# Patient Record
Sex: MALE | Race: WHITE | Employment: OTHER | RURAL
[De-identification: names, ages, dates, MRNs, and addresses within clinical notes are randomized per-mention and may not be internally consistent; named-entity substitution may affect disease eponyms.]

---

## 2017-01-09 DIAGNOSIS — G89.29 CHRONIC MIDLINE LOW BACK PAIN, WITH SCIATICA PRESENCE UNSPECIFIED: ICD-10-CM

## 2017-01-09 DIAGNOSIS — M54.5 CHRONIC MIDLINE LOW BACK PAIN, WITH SCIATICA PRESENCE UNSPECIFIED: ICD-10-CM

## 2017-01-09 DIAGNOSIS — M19.90 ARTHRITIS: ICD-10-CM

## 2017-01-09 RX ORDER — HYDROCODONE BITARTRATE AND ACETAMINOPHEN 10; 325 MG/1; MG/1
1 TABLET ORAL EVERY 8 HOURS PRN
Qty: 90 TABLET | Refills: 0 | Status: SHIPPED | OUTPATIENT
Start: 2017-01-09 | End: 2017-02-06 | Stop reason: SDUPTHER

## 2017-01-10 DIAGNOSIS — M19.90 ARTHRITIS: ICD-10-CM

## 2017-01-10 RX ORDER — ALPRAZOLAM 1 MG/1
1 TABLET ORAL 3 TIMES DAILY PRN
Qty: 60 TABLET | Refills: 2 | OUTPATIENT
Start: 2017-01-10

## 2017-01-31 RX ORDER — POTASSIUM CHLORIDE 1500 MG/1
TABLET, EXTENDED RELEASE ORAL
Qty: 60 TABLET | Refills: 5 | Status: SHIPPED | OUTPATIENT
Start: 2017-01-31 | End: 2017-02-20 | Stop reason: SDUPTHER

## 2017-02-02 RX ORDER — AMLODIPINE BESYLATE 10 MG/1
TABLET ORAL
Qty: 30 TABLET | Refills: 5 | Status: SHIPPED | OUTPATIENT
Start: 2017-02-02 | End: 2017-05-04 | Stop reason: SDUPTHER

## 2017-02-06 DIAGNOSIS — M19.90 ARTHRITIS: ICD-10-CM

## 2017-02-06 DIAGNOSIS — G89.29 CHRONIC MIDLINE LOW BACK PAIN, WITH SCIATICA PRESENCE UNSPECIFIED: ICD-10-CM

## 2017-02-06 DIAGNOSIS — M54.5 CHRONIC MIDLINE LOW BACK PAIN, WITH SCIATICA PRESENCE UNSPECIFIED: ICD-10-CM

## 2017-02-06 RX ORDER — HYDROCODONE BITARTRATE AND ACETAMINOPHEN 10; 325 MG/1; MG/1
1 TABLET ORAL EVERY 8 HOURS PRN
Qty: 90 TABLET | Refills: 0 | Status: SHIPPED | OUTPATIENT
Start: 2017-02-06 | End: 2017-03-08 | Stop reason: SDUPTHER

## 2017-02-10 ENCOUNTER — OFFICE VISIT (OUTPATIENT)
Dept: FAMILY MEDICINE CLINIC | Age: 77
End: 2017-02-10
Payer: MEDICARE

## 2017-02-10 VITALS
WEIGHT: 162 LBS | SYSTOLIC BLOOD PRESSURE: 140 MMHG | DIASTOLIC BLOOD PRESSURE: 80 MMHG | HEIGHT: 70 IN | BODY MASS INDEX: 23.19 KG/M2

## 2017-02-10 DIAGNOSIS — M54.5 CHRONIC MIDLINE LOW BACK PAIN, WITH SCIATICA PRESENCE UNSPECIFIED: ICD-10-CM

## 2017-02-10 DIAGNOSIS — M1A.10X0 LEAD-INDUCED CHRONIC GOUT WITHOUT TOPHUS, UNSPECIFIED SITE, SEQUELA: ICD-10-CM

## 2017-02-10 DIAGNOSIS — T56.0X1S LEAD-INDUCED CHRONIC GOUT WITHOUT TOPHUS, UNSPECIFIED SITE, SEQUELA: ICD-10-CM

## 2017-02-10 DIAGNOSIS — I10 ESSENTIAL HYPERTENSION: Primary | ICD-10-CM

## 2017-02-10 DIAGNOSIS — F41.9 ANXIETY: ICD-10-CM

## 2017-02-10 DIAGNOSIS — G62.9 PERIPHERAL POLYNEUROPATHY: ICD-10-CM

## 2017-02-10 DIAGNOSIS — G89.29 CHRONIC MIDLINE LOW BACK PAIN, WITH SCIATICA PRESENCE UNSPECIFIED: ICD-10-CM

## 2017-02-10 PROCEDURE — 1123F ACP DISCUSS/DSCN MKR DOCD: CPT | Performed by: NURSE PRACTITIONER

## 2017-02-10 PROCEDURE — 4040F PNEUMOC VAC/ADMIN/RCVD: CPT | Performed by: NURSE PRACTITIONER

## 2017-02-10 PROCEDURE — G8420 CALC BMI NORM PARAMETERS: HCPCS | Performed by: NURSE PRACTITIONER

## 2017-02-10 PROCEDURE — G8427 DOCREV CUR MEDS BY ELIG CLIN: HCPCS | Performed by: NURSE PRACTITIONER

## 2017-02-10 PROCEDURE — 99214 OFFICE O/P EST MOD 30 MIN: CPT | Performed by: NURSE PRACTITIONER

## 2017-02-10 PROCEDURE — G8484 FLU IMMUNIZE NO ADMIN: HCPCS | Performed by: NURSE PRACTITIONER

## 2017-02-10 PROCEDURE — 4004F PT TOBACCO SCREEN RCVD TLK: CPT | Performed by: NURSE PRACTITIONER

## 2017-02-10 RX ORDER — GABAPENTIN 300 MG/1
300 CAPSULE ORAL NIGHTLY
Qty: 30 CAPSULE | Refills: 5 | Status: SHIPPED | OUTPATIENT
Start: 2017-02-10 | End: 2017-05-04 | Stop reason: SDUPTHER

## 2017-02-10 RX ORDER — PROBENECID AND COLCHICINE 500; .5 MG/1; MG/1
1 TABLET ORAL 2 TIMES DAILY
Qty: 60 TABLET | Refills: 5 | Status: SHIPPED | OUTPATIENT
Start: 2017-02-10 | End: 2017-05-04 | Stop reason: SDUPTHER

## 2017-02-10 ASSESSMENT — ENCOUNTER SYMPTOMS
DOUBLE VISION: 0
COUGH: 0
EYES NEGATIVE: 1
SHORTNESS OF BREATH: 0
WHEEZING: 0
ORTHOPNEA: 0
HEARTBURN: 0
NAUSEA: 0
ABDOMINAL PAIN: 0
GASTROINTESTINAL NEGATIVE: 1
EYE PAIN: 0
PHOTOPHOBIA: 0
BACK PAIN: 1
RESPIRATORY NEGATIVE: 1
DIARRHEA: 0

## 2017-02-14 RX ORDER — ALLOPURINOL 300 MG/1
TABLET ORAL
Qty: 30 TABLET | Refills: 5 | Status: SHIPPED | OUTPATIENT
Start: 2017-02-14 | End: 2017-05-04 | Stop reason: SDUPTHER

## 2017-02-20 RX ORDER — POTASSIUM CHLORIDE 20 MEQ/1
TABLET, EXTENDED RELEASE ORAL
Qty: 60 TABLET | Refills: 5 | Status: SHIPPED | OUTPATIENT
Start: 2017-02-20 | End: 2017-02-21 | Stop reason: SDUPTHER

## 2017-02-21 RX ORDER — POTASSIUM CHLORIDE 20 MEQ/1
TABLET, EXTENDED RELEASE ORAL
Qty: 60 TABLET | Refills: 5 | Status: SHIPPED | OUTPATIENT
Start: 2017-02-21 | End: 2017-02-22 | Stop reason: SDUPTHER

## 2017-02-22 RX ORDER — POTASSIUM CHLORIDE 20 MEQ/1
TABLET, EXTENDED RELEASE ORAL
Qty: 60 TABLET | Refills: 5 | Status: SHIPPED | OUTPATIENT
Start: 2017-02-22 | End: 2017-05-04 | Stop reason: SDUPTHER

## 2017-03-08 DIAGNOSIS — G89.29 CHRONIC MIDLINE LOW BACK PAIN, WITH SCIATICA PRESENCE UNSPECIFIED: ICD-10-CM

## 2017-03-08 DIAGNOSIS — M54.5 CHRONIC MIDLINE LOW BACK PAIN, WITH SCIATICA PRESENCE UNSPECIFIED: ICD-10-CM

## 2017-03-08 DIAGNOSIS — M19.90 ARTHRITIS: ICD-10-CM

## 2017-03-08 RX ORDER — HYDROCODONE BITARTRATE AND ACETAMINOPHEN 10; 325 MG/1; MG/1
1 TABLET ORAL EVERY 8 HOURS PRN
Qty: 90 TABLET | Refills: 0 | Status: SHIPPED | OUTPATIENT
Start: 2017-03-08 | End: 2017-04-05 | Stop reason: SDUPTHER

## 2017-03-08 RX ORDER — ALPRAZOLAM 1 MG/1
1 TABLET ORAL 3 TIMES DAILY PRN
Qty: 60 TABLET | Refills: 2 | Status: SHIPPED | OUTPATIENT
Start: 2017-03-08 | End: 2017-05-04 | Stop reason: SDUPTHER

## 2017-04-05 DIAGNOSIS — G89.29 CHRONIC MIDLINE LOW BACK PAIN, WITH SCIATICA PRESENCE UNSPECIFIED: ICD-10-CM

## 2017-04-05 DIAGNOSIS — M19.90 ARTHRITIS: ICD-10-CM

## 2017-04-05 DIAGNOSIS — M54.5 CHRONIC MIDLINE LOW BACK PAIN, WITH SCIATICA PRESENCE UNSPECIFIED: ICD-10-CM

## 2017-04-05 RX ORDER — HYDROCODONE BITARTRATE AND ACETAMINOPHEN 10; 325 MG/1; MG/1
1 TABLET ORAL EVERY 8 HOURS PRN
Qty: 90 TABLET | Refills: 0 | Status: SHIPPED | OUTPATIENT
Start: 2017-04-05 | End: 2017-05-04 | Stop reason: SDUPTHER

## 2017-05-01 RX ORDER — HYDROCHLOROTHIAZIDE 25 MG/1
TABLET ORAL
Qty: 30 TABLET | Refills: 5 | Status: SHIPPED | OUTPATIENT
Start: 2017-05-01 | End: 2017-05-04 | Stop reason: SDUPTHER

## 2017-05-04 ENCOUNTER — OFFICE VISIT (OUTPATIENT)
Dept: FAMILY MEDICINE CLINIC | Age: 77
End: 2017-05-04
Payer: MEDICARE

## 2017-05-04 VITALS
WEIGHT: 161 LBS | SYSTOLIC BLOOD PRESSURE: 120 MMHG | DIASTOLIC BLOOD PRESSURE: 88 MMHG | HEIGHT: 70 IN | BODY MASS INDEX: 23.05 KG/M2

## 2017-05-04 DIAGNOSIS — F41.9 ANXIETY: ICD-10-CM

## 2017-05-04 DIAGNOSIS — G89.29 CHRONIC MIDLINE LOW BACK PAIN, WITH SCIATICA PRESENCE UNSPECIFIED: ICD-10-CM

## 2017-05-04 DIAGNOSIS — M1A.10X0 LEAD-INDUCED CHRONIC GOUT WITHOUT TOPHUS, UNSPECIFIED SITE, SEQUELA: ICD-10-CM

## 2017-05-04 DIAGNOSIS — I10 ESSENTIAL HYPERTENSION: Primary | ICD-10-CM

## 2017-05-04 DIAGNOSIS — T56.0X1S LEAD-INDUCED CHRONIC GOUT WITHOUT TOPHUS, UNSPECIFIED SITE, SEQUELA: ICD-10-CM

## 2017-05-04 DIAGNOSIS — R53.83 MALAISE AND FATIGUE: ICD-10-CM

## 2017-05-04 DIAGNOSIS — E78.00 ELEVATED CHOLESTEROL: ICD-10-CM

## 2017-05-04 DIAGNOSIS — M54.5 CHRONIC MIDLINE LOW BACK PAIN, WITH SCIATICA PRESENCE UNSPECIFIED: ICD-10-CM

## 2017-05-04 DIAGNOSIS — M19.90 ARTHRITIS: ICD-10-CM

## 2017-05-04 DIAGNOSIS — R53.81 MALAISE AND FATIGUE: ICD-10-CM

## 2017-05-04 LAB
ALBUMIN SERPL-MCNC: 4.5 G/DL (ref 3.5–5.2)
ALP BLD-CCNC: 99 U/L (ref 40–130)
ALT SERPL-CCNC: 22 U/L (ref 5–41)
ANION GAP SERPL CALCULATED.3IONS-SCNC: 16 MMOL/L (ref 7–19)
AST SERPL-CCNC: 23 U/L (ref 5–40)
BILIRUB SERPL-MCNC: 0.8 MG/DL (ref 0.2–1.2)
BUN BLDV-MCNC: 13 MG/DL (ref 8–23)
CALCIUM SERPL-MCNC: 10.2 MG/DL (ref 8.8–10.2)
CHLORIDE BLD-SCNC: 87 MMOL/L (ref 98–111)
CHOLESTEROL, TOTAL: 195 MG/DL (ref 160–199)
CO2: 30 MMOL/L (ref 22–29)
CREAT SERPL-MCNC: 1 MG/DL (ref 0.5–1.2)
GFR NON-AFRICAN AMERICAN: >60
GLOBULIN: 3.1 G/DL
GLUCOSE BLD-MCNC: 88 MG/DL (ref 74–109)
HCT VFR BLD CALC: 47.4 % (ref 42–52)
HDLC SERPL-MCNC: 86 MG/DL (ref 55–121)
HEMOGLOBIN: 16.5 G/DL (ref 14–18)
LDL CHOLESTEROL CALCULATED: 95 MG/DL
MCH RBC QN AUTO: 34.9 PG (ref 27–31)
MCHC RBC AUTO-ENTMCNC: 34.8 G/DL (ref 33–37)
MCV RBC AUTO: 100.2 FL (ref 80–94)
PDW BLD-RTO: 12.9 % (ref 11.5–14.5)
PLATELET # BLD: 243 K/UL (ref 130–400)
PMV BLD AUTO: 10.7 FL (ref 7.4–10.4)
POTASSIUM SERPL-SCNC: 4.1 MMOL/L (ref 3.5–5)
RBC # BLD: 4.73 M/UL (ref 4.7–6.1)
SODIUM BLD-SCNC: 133 MMOL/L (ref 136–145)
TOTAL PROTEIN: 7.6 G/DL (ref 6.6–8.7)
TRIGL SERPL-MCNC: 68 MG/DL (ref 150–199)
WBC # BLD: 5.7 K/UL (ref 4.8–10.8)

## 2017-05-04 PROCEDURE — G8427 DOCREV CUR MEDS BY ELIG CLIN: HCPCS | Performed by: NURSE PRACTITIONER

## 2017-05-04 PROCEDURE — 1123F ACP DISCUSS/DSCN MKR DOCD: CPT | Performed by: NURSE PRACTITIONER

## 2017-05-04 PROCEDURE — 36415 COLL VENOUS BLD VENIPUNCTURE: CPT | Performed by: NURSE PRACTITIONER

## 2017-05-04 PROCEDURE — 96372 THER/PROPH/DIAG INJ SC/IM: CPT | Performed by: NURSE PRACTITIONER

## 2017-05-04 PROCEDURE — 99214 OFFICE O/P EST MOD 30 MIN: CPT | Performed by: NURSE PRACTITIONER

## 2017-05-04 PROCEDURE — 4004F PT TOBACCO SCREEN RCVD TLK: CPT | Performed by: NURSE PRACTITIONER

## 2017-05-04 PROCEDURE — G8420 CALC BMI NORM PARAMETERS: HCPCS | Performed by: NURSE PRACTITIONER

## 2017-05-04 PROCEDURE — 4040F PNEUMOC VAC/ADMIN/RCVD: CPT | Performed by: NURSE PRACTITIONER

## 2017-05-04 RX ORDER — DICLOFENAC SODIUM 75 MG/1
75 TABLET, DELAYED RELEASE ORAL 2 TIMES DAILY PRN
Qty: 60 TABLET | Refills: 5 | Status: SHIPPED | OUTPATIENT
Start: 2017-05-04

## 2017-05-04 RX ORDER — HYDROCODONE BITARTRATE AND ACETAMINOPHEN 10; 325 MG/1; MG/1
1 TABLET ORAL EVERY 8 HOURS PRN
Qty: 90 TABLET | Refills: 0 | Status: SHIPPED | OUTPATIENT
Start: 2017-05-04 | End: 2017-06-05 | Stop reason: SDUPTHER

## 2017-05-04 RX ORDER — CYANOCOBALAMIN 1000 UG/ML
1000 INJECTION INTRAMUSCULAR; SUBCUTANEOUS ONCE
Status: COMPLETED | OUTPATIENT
Start: 2017-05-04 | End: 2017-05-04

## 2017-05-04 RX ORDER — ALPRAZOLAM 1 MG/1
1 TABLET ORAL 3 TIMES DAILY PRN
Qty: 60 TABLET | Refills: 2 | Status: CANCELLED | OUTPATIENT
Start: 2017-05-04

## 2017-05-04 RX ORDER — GABAPENTIN 300 MG/1
300 CAPSULE ORAL NIGHTLY
Qty: 30 CAPSULE | Refills: 5 | Status: SHIPPED | OUTPATIENT
Start: 2017-05-04

## 2017-05-04 RX ORDER — POTASSIUM CHLORIDE 20 MEQ/1
TABLET, EXTENDED RELEASE ORAL
Qty: 60 TABLET | Refills: 5 | Status: SHIPPED | OUTPATIENT
Start: 2017-05-04

## 2017-05-04 RX ORDER — ALLOPURINOL 300 MG/1
TABLET ORAL
Qty: 30 TABLET | Refills: 5 | Status: SHIPPED | OUTPATIENT
Start: 2017-05-04 | End: 2017-10-31 | Stop reason: SDUPTHER

## 2017-05-04 RX ORDER — HYDROCHLOROTHIAZIDE 25 MG/1
TABLET ORAL
Qty: 30 TABLET | Refills: 5 | Status: SHIPPED | OUTPATIENT
Start: 2017-05-04

## 2017-05-04 RX ORDER — AMLODIPINE BESYLATE 10 MG/1
TABLET ORAL
Qty: 30 TABLET | Refills: 5 | Status: SHIPPED | OUTPATIENT
Start: 2017-05-04

## 2017-05-04 RX ORDER — ALPRAZOLAM 1 MG/1
1 TABLET ORAL 3 TIMES DAILY PRN
Qty: 60 TABLET | Refills: 2 | Status: SHIPPED | OUTPATIENT
Start: 2017-05-04 | End: 2017-06-29 | Stop reason: SDUPTHER

## 2017-05-04 RX ORDER — PROBENECID AND COLCHICINE 500; .5 MG/1; MG/1
1 TABLET ORAL 2 TIMES DAILY
Qty: 60 TABLET | Refills: 5 | Status: SHIPPED | OUTPATIENT
Start: 2017-05-04 | End: 2017-11-28 | Stop reason: SDUPTHER

## 2017-05-04 RX ORDER — DIPHENHYDRAMINE HCL 25 MG
CAPSULE ORAL
Qty: 90 CAPSULE | Refills: 2 | Status: SHIPPED | OUTPATIENT
Start: 2017-05-04 | End: 2017-11-08 | Stop reason: SDUPTHER

## 2017-05-04 RX ADMIN — CYANOCOBALAMIN 1000 MCG: 1000 INJECTION INTRAMUSCULAR; SUBCUTANEOUS at 13:04

## 2017-05-04 ASSESSMENT — ENCOUNTER SYMPTOMS
EYE PAIN: 0
PHOTOPHOBIA: 0
WHEEZING: 0
NAUSEA: 0
DIARRHEA: 0
HEARTBURN: 0
COUGH: 0
DOUBLE VISION: 0
GASTROINTESTINAL NEGATIVE: 1
ORTHOPNEA: 0
BACK PAIN: 1
EYES NEGATIVE: 1
SHORTNESS OF BREATH: 0
ABDOMINAL PAIN: 0
RESPIRATORY NEGATIVE: 1

## 2017-06-05 DIAGNOSIS — G89.29 CHRONIC MIDLINE LOW BACK PAIN, WITH SCIATICA PRESENCE UNSPECIFIED: ICD-10-CM

## 2017-06-05 DIAGNOSIS — M54.5 CHRONIC MIDLINE LOW BACK PAIN, WITH SCIATICA PRESENCE UNSPECIFIED: ICD-10-CM

## 2017-06-05 DIAGNOSIS — M19.90 ARTHRITIS: ICD-10-CM

## 2017-06-05 RX ORDER — HYDROCODONE BITARTRATE AND ACETAMINOPHEN 10; 325 MG/1; MG/1
1 TABLET ORAL EVERY 8 HOURS PRN
Qty: 90 TABLET | Refills: 0 | Status: SHIPPED | OUTPATIENT
Start: 2017-06-05 | End: 2017-06-29 | Stop reason: SDUPTHER

## 2017-06-29 DIAGNOSIS — M19.90 ARTHRITIS: ICD-10-CM

## 2017-06-29 DIAGNOSIS — M54.5 CHRONIC MIDLINE LOW BACK PAIN, WITH SCIATICA PRESENCE UNSPECIFIED: ICD-10-CM

## 2017-06-29 DIAGNOSIS — G89.29 CHRONIC MIDLINE LOW BACK PAIN, WITH SCIATICA PRESENCE UNSPECIFIED: ICD-10-CM

## 2017-06-29 RX ORDER — ALPRAZOLAM 1 MG/1
1 TABLET ORAL 3 TIMES DAILY PRN
Qty: 60 TABLET | Refills: 0 | Status: SHIPPED | OUTPATIENT
Start: 2017-06-29 | End: 2017-08-04 | Stop reason: SDUPTHER

## 2017-06-29 RX ORDER — HYDROCODONE BITARTRATE AND ACETAMINOPHEN 10; 325 MG/1; MG/1
1 TABLET ORAL EVERY 8 HOURS PRN
Qty: 90 TABLET | Refills: 0 | Status: SHIPPED | OUTPATIENT
Start: 2017-06-29 | End: 2017-08-04 | Stop reason: SDUPTHER

## 2017-08-04 ENCOUNTER — OFFICE VISIT (OUTPATIENT)
Dept: FAMILY MEDICINE CLINIC | Age: 77
End: 2017-08-04
Payer: MEDICARE

## 2017-08-04 VITALS
WEIGHT: 155 LBS | DIASTOLIC BLOOD PRESSURE: 82 MMHG | SYSTOLIC BLOOD PRESSURE: 132 MMHG | HEIGHT: 70 IN | BODY MASS INDEX: 22.19 KG/M2

## 2017-08-04 DIAGNOSIS — F41.9 ANXIETY: ICD-10-CM

## 2017-08-04 DIAGNOSIS — M19.90 ARTHRITIS: ICD-10-CM

## 2017-08-04 DIAGNOSIS — R53.83 MALAISE AND FATIGUE: Primary | ICD-10-CM

## 2017-08-04 DIAGNOSIS — M54.5 CHRONIC MIDLINE LOW BACK PAIN, WITH SCIATICA PRESENCE UNSPECIFIED: ICD-10-CM

## 2017-08-04 DIAGNOSIS — G89.29 CHRONIC MIDLINE LOW BACK PAIN, WITH SCIATICA PRESENCE UNSPECIFIED: ICD-10-CM

## 2017-08-04 DIAGNOSIS — R53.81 MALAISE AND FATIGUE: Primary | ICD-10-CM

## 2017-08-04 LAB
AMPHETAMINE SCREEN, URINE: NORMAL
BARBITURATE SCREEN, URINE: NORMAL
BENZODIAZEPINE SCREEN, URINE: NORMAL
COCAINE METABOLITE SCREEN URINE: NORMAL
MDMA URINE: NORMAL
METHADONE SCREEN, URINE: NORMAL
METHAMPHETAMINE, URINE: NORMAL
OPIATE SCREEN URINE: NORMAL
OXYCODONE SCREEN URINE: NORMAL
PHENCYCLIDINE SCREEN URINE: NORMAL
PROPOXYPHENE SCREEN, URINE: NORMAL
THC: NORMAL
TRICYCLIC ANTIDEPRESSANTS, UR: NORMAL

## 2017-08-04 PROCEDURE — 99213 OFFICE O/P EST LOW 20 MIN: CPT | Performed by: NURSE PRACTITIONER

## 2017-08-04 PROCEDURE — 1123F ACP DISCUSS/DSCN MKR DOCD: CPT | Performed by: NURSE PRACTITIONER

## 2017-08-04 PROCEDURE — 96372 THER/PROPH/DIAG INJ SC/IM: CPT | Performed by: NURSE PRACTITIONER

## 2017-08-04 PROCEDURE — G8427 DOCREV CUR MEDS BY ELIG CLIN: HCPCS | Performed by: NURSE PRACTITIONER

## 2017-08-04 PROCEDURE — 4004F PT TOBACCO SCREEN RCVD TLK: CPT | Performed by: NURSE PRACTITIONER

## 2017-08-04 PROCEDURE — 4040F PNEUMOC VAC/ADMIN/RCVD: CPT | Performed by: NURSE PRACTITIONER

## 2017-08-04 PROCEDURE — 80305 DRUG TEST PRSMV DIR OPT OBS: CPT | Performed by: NURSE PRACTITIONER

## 2017-08-04 PROCEDURE — G8420 CALC BMI NORM PARAMETERS: HCPCS | Performed by: NURSE PRACTITIONER

## 2017-08-04 RX ORDER — CYANOCOBALAMIN 1000 UG/ML
1000 INJECTION INTRAMUSCULAR; SUBCUTANEOUS ONCE
Status: COMPLETED | OUTPATIENT
Start: 2017-08-04 | End: 2017-08-04

## 2017-08-04 RX ORDER — ALPRAZOLAM 1 MG/1
1 TABLET ORAL 3 TIMES DAILY PRN
Qty: 60 TABLET | Refills: 0 | Status: SHIPPED | OUTPATIENT
Start: 2017-08-04 | End: 2017-09-05 | Stop reason: SDUPTHER

## 2017-08-04 RX ORDER — HYDROCODONE BITARTRATE AND ACETAMINOPHEN 10; 325 MG/1; MG/1
1 TABLET ORAL EVERY 8 HOURS PRN
Qty: 90 TABLET | Refills: 0 | Status: SHIPPED | OUTPATIENT
Start: 2017-08-04 | End: 2017-09-07 | Stop reason: SDUPTHER

## 2017-08-04 RX ADMIN — CYANOCOBALAMIN 1000 MCG: 1000 INJECTION INTRAMUSCULAR; SUBCUTANEOUS at 10:58

## 2017-08-04 ASSESSMENT — ENCOUNTER SYMPTOMS
ORTHOPNEA: 0
DIARRHEA: 0
COUGH: 0
PHOTOPHOBIA: 0
NAUSEA: 0
EYE PAIN: 0
DOUBLE VISION: 0
BACK PAIN: 1
GASTROINTESTINAL NEGATIVE: 1
WHEEZING: 0
SHORTNESS OF BREATH: 0
ABDOMINAL PAIN: 0
HEARTBURN: 0
RESPIRATORY NEGATIVE: 1
EYES NEGATIVE: 1

## 2017-09-05 DIAGNOSIS — M19.90 ARTHRITIS: ICD-10-CM

## 2017-09-05 RX ORDER — ALPRAZOLAM 1 MG/1
TABLET ORAL
Qty: 60 TABLET | Refills: 0 | Status: SHIPPED | OUTPATIENT
Start: 2017-09-05 | End: 2017-09-06 | Stop reason: SDUPTHER

## 2017-09-06 DIAGNOSIS — M19.90 ARTHRITIS: ICD-10-CM

## 2017-09-06 RX ORDER — ALPRAZOLAM 1 MG/1
TABLET ORAL
Qty: 60 TABLET | Refills: 0 | Status: SHIPPED | OUTPATIENT
Start: 2017-09-06

## 2017-09-07 DIAGNOSIS — G89.29 CHRONIC MIDLINE LOW BACK PAIN, WITH SCIATICA PRESENCE UNSPECIFIED: ICD-10-CM

## 2017-09-07 DIAGNOSIS — M19.90 ARTHRITIS: ICD-10-CM

## 2017-09-07 DIAGNOSIS — M54.5 CHRONIC MIDLINE LOW BACK PAIN, WITH SCIATICA PRESENCE UNSPECIFIED: ICD-10-CM

## 2017-09-07 RX ORDER — HYDROCODONE BITARTRATE AND ACETAMINOPHEN 10; 325 MG/1; MG/1
1 TABLET ORAL EVERY 8 HOURS PRN
Qty: 90 TABLET | Refills: 0 | Status: SHIPPED | OUTPATIENT
Start: 2017-09-07 | End: 2017-10-06 | Stop reason: SDUPTHER

## 2017-10-03 ENCOUNTER — TELEPHONE (OUTPATIENT)
Dept: FAMILY MEDICINE CLINIC | Age: 77
End: 2017-10-03

## 2017-10-03 NOTE — TELEPHONE ENCOUNTER
Left Message that [facility] last day is 10-18-17. And that their appointment had been canceled and to call office if they wish to see Vicente Richards [place] 79 at [place]. We will be happy to mail them a records release for them to sign for us to forward their records.

## 2017-10-06 ENCOUNTER — TELEPHONE (OUTPATIENT)
Dept: FAMILY MEDICINE CLINIC | Age: 77
End: 2017-10-06

## 2017-10-06 DIAGNOSIS — M54.5 CHRONIC MIDLINE LOW BACK PAIN, WITH SCIATICA PRESENCE UNSPECIFIED: ICD-10-CM

## 2017-10-06 DIAGNOSIS — M19.90 ARTHRITIS: ICD-10-CM

## 2017-10-06 DIAGNOSIS — G89.29 CHRONIC MIDLINE LOW BACK PAIN, WITH SCIATICA PRESENCE UNSPECIFIED: ICD-10-CM

## 2017-10-06 RX ORDER — HYDROCODONE BITARTRATE AND ACETAMINOPHEN 10; 325 MG/1; MG/1
1 TABLET ORAL EVERY 8 HOURS PRN
Qty: 90 TABLET | Refills: 0 | Status: SHIPPED | OUTPATIENT
Start: 2017-10-06

## 2017-10-31 RX ORDER — ALLOPURINOL 300 MG/1
TABLET ORAL
Qty: 30 TABLET | Refills: 0 | Status: SHIPPED | OUTPATIENT
Start: 2017-10-31 | End: 2017-10-31 | Stop reason: SDUPTHER

## 2017-10-31 RX ORDER — ALLOPURINOL 300 MG/1
TABLET ORAL
Qty: 30 TABLET | Refills: 0 | Status: SHIPPED | OUTPATIENT
Start: 2017-10-31 | End: 2017-12-28 | Stop reason: SDUPTHER

## 2017-10-31 NOTE — TELEPHONE ENCOUNTER
Requested Prescriptions     Pending Prescriptions Disp Refills    allopurinol (ZYLOPRIM) 300 MG tablet 30 tablet 0     Sig: TAKE ONE TABLET BY MOUTH DAILY

## 2017-11-08 DIAGNOSIS — M19.90 ARTHRITIS: ICD-10-CM

## 2017-11-08 RX ORDER — DIPHENHYDRAMINE HCL 25 MG
CAPSULE ORAL
Qty: 90 CAPSULE | Refills: 5 | Status: SHIPPED | OUTPATIENT
Start: 2017-11-08

## 2017-11-28 RX ORDER — PROBENECID AND COLCHICINE 500; .5 MG/1; MG/1
1 TABLET ORAL 2 TIMES DAILY
Qty: 60 TABLET | Refills: 0 | Status: SHIPPED | OUTPATIENT
Start: 2017-11-28 | End: 2017-12-25 | Stop reason: SDUPTHER

## 2017-11-28 NOTE — TELEPHONE ENCOUNTER
Requested Prescriptions     Pending Prescriptions Disp Refills    colchicine-probenecid 0.5-500 MG per tablet 60 tablet 0     Sig: Take 1 tablet by mouth 2 times daily

## 2017-12-26 RX ORDER — PROBENECID AND COLCHICINE 500; .5 MG/1; MG/1
1 TABLET ORAL 2 TIMES DAILY
Qty: 60 TABLET | Refills: 0 | Status: SHIPPED | OUTPATIENT
Start: 2017-12-26

## 2017-12-28 RX ORDER — ALLOPURINOL 300 MG/1
TABLET ORAL
Qty: 30 TABLET | Refills: 0 | Status: SHIPPED | OUTPATIENT
Start: 2017-12-28

## 2018-04-30 ENCOUNTER — TELEPHONE (OUTPATIENT)
Dept: CARDIOLOGY | Facility: CLINIC | Age: 78
End: 2018-04-30

## 2018-04-30 NOTE — TELEPHONE ENCOUNTER
Patient called and stated that he had a flat tire and won't be able to make his appointment with Dr. Angulo this morning.  Transferred to  to reschedule.

## 2018-05-21 ENCOUNTER — OFFICE VISIT (OUTPATIENT)
Dept: CARDIOLOGY | Facility: CLINIC | Age: 78
End: 2018-05-21

## 2018-05-21 VITALS
WEIGHT: 154 LBS | HEART RATE: 83 BPM | OXYGEN SATURATION: 98 % | HEIGHT: 70 IN | DIASTOLIC BLOOD PRESSURE: 70 MMHG | BODY MASS INDEX: 22.05 KG/M2 | SYSTOLIC BLOOD PRESSURE: 140 MMHG

## 2018-05-21 DIAGNOSIS — F10.10 ALCOHOL ABUSE: ICD-10-CM

## 2018-05-21 DIAGNOSIS — I49.1 PREMATURE ATRIAL CONTRACTIONS: Primary | ICD-10-CM

## 2018-05-21 DIAGNOSIS — I10 ESSENTIAL HYPERTENSION: ICD-10-CM

## 2018-05-21 DIAGNOSIS — E87.6 HYPOKALEMIA: ICD-10-CM

## 2018-05-21 DIAGNOSIS — R06.09 DYSPNEA ON EXERTION: ICD-10-CM

## 2018-05-21 DIAGNOSIS — Z72.0 TOBACCO ABUSE: ICD-10-CM

## 2018-05-21 PROBLEM — F41.9 ANXIETY: Status: ACTIVE | Noted: 2018-05-21

## 2018-05-21 PROBLEM — M10.9 GOUT: Status: ACTIVE | Noted: 2018-05-21

## 2018-05-21 PROCEDURE — 93000 ELECTROCARDIOGRAM COMPLETE: CPT | Performed by: INTERNAL MEDICINE

## 2018-05-21 PROCEDURE — 99204 OFFICE O/P NEW MOD 45 MIN: CPT | Performed by: INTERNAL MEDICINE

## 2018-05-21 NOTE — PROGRESS NOTES
Reason for Visit: Abnormal EKG.    HPI:  Hernan Su is a 77 y.o. male is here today for consultation at the request of Nataly Faria for evaluation of an abnormal EKG that showed premature atrial contractions.  He had the EKG done due to an irregular rhythm on exam.  He notices occasional skipped beats.  He denies any significant palpitations.  He is trying to quit smoking due to some shortness of breath.  Also complains of weakness.  He smokes less than a 1/2 PPD.  He also denies any chest pain, dizziness, syncope, PND, or orthopnea.  He has heavy alcohol abuse and drinks about 10 beers a day.    Previous Cardiac Testing and Procedures:  - Lipid panel (3/3/18) 232/95/106/156  - CMP (3/3/18) Cr 0.9, K 4.0, Cl 92, Na 137    Patient Active Problem List   Diagnosis   • Essential hypertension   • Tobacco abuse   • Anxiety   • Gout   • Hypokalemia   • Alcohol abuse       Social History   Substance Use Topics   • Smoking status: Not on file   • Smokeless tobacco: Not on file   • Alcohol use Not on file       No family history on file.    The following portions of the patient's history were reviewed and updated as appropriate: allergies, current medications, past family history, past medical history, past social history, past surgical history and problem list.      Current Outpatient Prescriptions:   •  allopurinol (ZYLOPRIM) 300 MG tablet, Take 300 mg by mouth Daily., Disp: , Rfl:   •  ALPRAZolam (XANAX) 1 MG tablet, Take 1 mg by mouth 2 (Two) Times a Day As Needed for Anxiety., Disp: , Rfl:   •  amitriptyline (ELAVIL) 25 MG tablet, Take 25 mg by mouth Every Night., Disp: , Rfl:   •  amLODIPine (NORVASC) 10 MG tablet, Take 10 mg by mouth Daily., Disp: , Rfl:   •  colchicine-probenecid (COL-BENEMID) 0.5-500 MG tablet, Take 1 tablet by mouth Daily., Disp: , Rfl:   •  diclofenac (VOLTAREN) 0.1 % ophthalmic solution, 1 drop 4 (Four) Times a Day., Disp: , Rfl:   •  diphenhydrAMINE (BENADRYL) 25 mg capsule, Take 25  "mg by mouth Every 6 (Six) Hours As Needed for Itching., Disp: , Rfl:   •  gabapentin (NEURONTIN) 300 MG capsule, Take 300 mg by mouth 3 (Three) Times a Day., Disp: , Rfl:   •  hydrochlorothiazide (HYDRODIURIL) 25 MG tablet, Take 25 mg by mouth Daily., Disp: , Rfl:   •  HYDROcodone-acetaminophen (NORCO)  MG per tablet, Take 1 tablet by mouth Every 6 (Six) Hours As Needed for Moderate Pain ., Disp: , Rfl:   •  lidocaine 3 % cream cream, Apply  topically Every 4 (Four) Hours As Needed., Disp: , Rfl:   •  potassium chloride (K-DUR,KLOR-CON) 20 MEQ CR tablet, Take 20 mEq by mouth 2 (Two) Times a Day., Disp: , Rfl:     Review of Systems   Constitution: Negative for chills, fever and weight loss.   HENT: Negative for sore throat.    Eyes: Negative for blurred vision and visual disturbance.   Cardiovascular: Positive for dyspnea on exertion and irregular heartbeat. Negative for chest pain, leg swelling, palpitations, paroxysmal nocturnal dyspnea and syncope.   Respiratory: Positive for shortness of breath. Negative for cough.    Endocrine: Negative for cold intolerance and polyuria.   Skin: Negative for itching and rash.   Musculoskeletal: Negative for joint swelling and myalgias.   Gastrointestinal: Negative for abdominal pain, diarrhea, heartburn and vomiting.   Genitourinary: Negative for dysuria and hematuria.   Neurological: Negative for dizziness, headaches and numbness.   Psychiatric/Behavioral: Negative for depression. The patient is not nervous/anxious.    Allergic/Immunologic: Negative for hives.       Objective   /70 (BP Location: Right arm, Patient Position: Sitting, Cuff Size: Adult)   Pulse 83   Ht 177.8 cm (70\")   Wt 69.9 kg (154 lb)   SpO2 98%   BMI 22.10 kg/m²   Physical Exam   Constitutional: He is oriented to person, place, and time. He appears well-developed and well-nourished.   HENT:   Head: Normocephalic and atraumatic.   Eyes: Conjunctivae and EOM are normal. Pupils are equal, round, " and reactive to light.   Neck: Normal range of motion. Neck supple. No JVD present. No thyromegaly present.   Cardiovascular: Normal rate and normal heart sounds.  An irregular rhythm present.   No murmur heard.  Pulmonary/Chest: Effort normal and breath sounds normal. He has no wheezes. He has no rales.   Abdominal: Soft. Bowel sounds are normal. He exhibits no distension. There is no tenderness.   Musculoskeletal: Normal range of motion. He exhibits no edema.   Neurological: He is alert and oriented to person, place, and time. Coordination normal.   Skin: Skin is warm and dry. No rash noted.   Psychiatric: He has a normal mood and affect. His behavior is normal.       ECG 12 Lead  Date/Time: 5/21/2018 9:29 AM  Performed by: ETHAN GERARD  Authorized by: ETHAN GERARD   Comparison: compared with previous ECG from 4/2/2018  Similar to previous ECG  Rhythm: sinus rhythm  Ectopy: atrial premature contractions  Rate: normal              ICD-10-CM ICD-9-CM   1. Premature atrial contractions I49.1 427.61   2. Essential hypertension I10 401.9   3. Tobacco abuse Z72.0 305.1   4. Hypokalemia E87.6 276.8   5. Dyspnea on exertion R06.09 786.09   6. Alcohol abuse F10.10 305.00         Assessment/Plan:  1. Premature atrial contractions: Typically a benign arrhythmia.  Patient is minimally symptomatic at worst.  Can consider a Holter monitor if symptoms progress.  Patient declines currently.    2.  Essential hypertension: Pressure is borderline today.  He notes is typically better controlled at home.  Continue to monitor.    3.  Tobacco abuse: Has cut down to less than half pack per day.   on complete cessation.    4.  Hypokalemia: Most recent potassium was within normal limits.  Could contribute to premature atrial contractions.    5.  Dyspnea on exertion: Likely has a component of COPD.  Patient declined further workup currently.  Could consider an echo and pulmonary function tests if symptoms progress.    6.   Alcohol abuse: Patient drinking about 10 beers per day.  Could contribute to premature atrial contractions.   on cessation.

## 2018-10-19 ENCOUNTER — HOSPITAL ENCOUNTER (INPATIENT)
Facility: HOSPITAL | Age: 78
LOS: 2 days | Discharge: HOME | End: 2018-10-21
Attending: EMERGENCY MEDICINE | Admitting: INTERNAL MEDICINE

## 2018-10-19 ENCOUNTER — APPOINTMENT (OUTPATIENT)
Dept: GENERAL RADIOLOGY | Facility: HOSPITAL | Age: 78
End: 2018-10-19

## 2018-10-19 DIAGNOSIS — I21.3 ST ELEVATION MYOCARDIAL INFARCTION (STEMI), UNSPECIFIED ARTERY (HCC): Primary | ICD-10-CM

## 2018-10-19 DIAGNOSIS — I21.11 ST ELEVATION MYOCARDIAL INFARCTION INVOLVING RIGHT CORONARY ARTERY (HCC): ICD-10-CM

## 2018-10-19 LAB
ALBUMIN SERPL-MCNC: 4 G/DL (ref 3.5–5)
ALBUMIN/GLOB SERPL: 1.2 G/DL (ref 1.1–2.5)
ALP SERPL-CCNC: 115 U/L (ref 24–120)
ALT SERPL W P-5'-P-CCNC: 33 U/L (ref 0–54)
ANION GAP SERPL CALCULATED.3IONS-SCNC: 12 MMOL/L (ref 4–13)
AST SERPL-CCNC: 72 U/L (ref 7–45)
BASOPHILS # BLD AUTO: 0.06 10*3/MM3 (ref 0–0.2)
BASOPHILS NFR BLD AUTO: 0.5 % (ref 0–2)
BILIRUB SERPL-MCNC: 1.5 MG/DL (ref 0.1–1)
BUN BLD-MCNC: 11 MG/DL (ref 5–21)
BUN/CREAT SERPL: 12.5 (ref 7–25)
CALCIUM SPEC-SCNC: 9.8 MG/DL (ref 8.4–10.4)
CHLORIDE SERPL-SCNC: 91 MMOL/L (ref 98–110)
CO2 SERPL-SCNC: 29 MMOL/L (ref 24–31)
CREAT BLD-MCNC: 0.88 MG/DL (ref 0.5–1.4)
DEPRECATED RDW RBC AUTO: 53.2 FL (ref 40–54)
EOSINOPHIL # BLD AUTO: 0.01 10*3/MM3 (ref 0–0.7)
EOSINOPHIL NFR BLD AUTO: 0.1 % (ref 0–4)
ERYTHROCYTE [DISTWIDTH] IN BLOOD BY AUTOMATED COUNT: 14.2 % (ref 12–15)
GFR SERPL CREATININE-BSD FRML MDRD: 84 ML/MIN/1.73
GLOBULIN UR ELPH-MCNC: 3.3 GM/DL
GLUCOSE BLD-MCNC: 139 MG/DL (ref 70–100)
HCT VFR BLD AUTO: 53.4 % (ref 40–52)
HGB BLD-MCNC: 19.3 G/DL (ref 14–18)
HOLD SPECIMEN: NORMAL
IMM GRANULOCYTES # BLD: 0.12 10*3/MM3 (ref 0–0.03)
IMM GRANULOCYTES NFR BLD: 0.9 % (ref 0–5)
LYMPHOCYTES # BLD AUTO: 0.69 10*3/MM3 (ref 0.72–4.86)
LYMPHOCYTES NFR BLD AUTO: 5.4 % (ref 15–45)
MCH RBC QN AUTO: 36.7 PG (ref 28–32)
MCHC RBC AUTO-ENTMCNC: 36.1 G/DL (ref 33–36)
MCV RBC AUTO: 101.5 FL (ref 82–95)
MONOCYTES # BLD AUTO: 0.94 10*3/MM3 (ref 0.19–1.3)
MONOCYTES NFR BLD AUTO: 7.3 % (ref 4–12)
NEUTROPHILS # BLD AUTO: 11.01 10*3/MM3 (ref 1.87–8.4)
NEUTROPHILS NFR BLD AUTO: 85.8 % (ref 39–78)
NRBC BLD MANUAL-RTO: 0 /100 WBC (ref 0–0)
PLATELET # BLD AUTO: 283 10*3/MM3 (ref 130–400)
PMV BLD AUTO: 9.7 FL (ref 6–12)
POTASSIUM BLD-SCNC: 3.6 MMOL/L (ref 3.5–5.3)
PROT SERPL-MCNC: 7.3 G/DL (ref 6.3–8.7)
RBC # BLD AUTO: 5.26 10*6/MM3 (ref 4.8–5.9)
SODIUM BLD-SCNC: 132 MMOL/L (ref 135–145)
TROPONIN I SERPL-MCNC: 2.11 NG/ML (ref 0–0.03)
TROPONIN I SERPL-MCNC: 295 NG/ML (ref 0–0.03)
TROPONIN I SERPL-MCNC: 340 NG/ML (ref 0–0.03)
WBC NRBC COR # BLD: 12.83 10*3/MM3 (ref 4.8–10.8)
WHOLE BLOOD HOLD SPECIMEN: NORMAL
WHOLE BLOOD HOLD SPECIMEN: NORMAL

## 2018-10-19 PROCEDURE — 25010000002 DIPHENHYDRAMINE PER 50 MG: Performed by: INTERNAL MEDICINE

## 2018-10-19 PROCEDURE — 93005 ELECTROCARDIOGRAM TRACING: CPT | Performed by: EMERGENCY MEDICINE

## 2018-10-19 PROCEDURE — C1760 CLOSURE DEV, VASC: HCPCS | Performed by: INTERNAL MEDICINE

## 2018-10-19 PROCEDURE — 92941 PRQ TRLML REVSC TOT OCCL AMI: CPT | Performed by: INTERNAL MEDICINE

## 2018-10-19 PROCEDURE — 93010 ELECTROCARDIOGRAM REPORT: CPT | Performed by: INTERNAL MEDICINE

## 2018-10-19 PROCEDURE — 80053 COMPREHEN METABOLIC PANEL: CPT | Performed by: EMERGENCY MEDICINE

## 2018-10-19 PROCEDURE — 4A023N7 MEASUREMENT OF CARDIAC SAMPLING AND PRESSURE, LEFT HEART, PERCUTANEOUS APPROACH: ICD-10-PCS | Performed by: INTERNAL MEDICINE

## 2018-10-19 PROCEDURE — C9606 PERC D-E COR REVASC W AMI S: HCPCS | Performed by: INTERNAL MEDICINE

## 2018-10-19 PROCEDURE — 99152 MOD SED SAME PHYS/QHP 5/>YRS: CPT | Performed by: INTERNAL MEDICINE

## 2018-10-19 PROCEDURE — 94760 N-INVAS EAR/PLS OXIMETRY 1: CPT

## 2018-10-19 PROCEDURE — 93458 L HRT ARTERY/VENTRICLE ANGIO: CPT | Performed by: INTERNAL MEDICINE

## 2018-10-19 PROCEDURE — 99223 1ST HOSP IP/OBS HIGH 75: CPT | Performed by: INTERNAL MEDICINE

## 2018-10-19 PROCEDURE — C1769 GUIDE WIRE: HCPCS | Performed by: INTERNAL MEDICINE

## 2018-10-19 PROCEDURE — 94799 UNLISTED PULMONARY SVC/PX: CPT

## 2018-10-19 PROCEDURE — 99285 EMERGENCY DEPT VISIT HI MDM: CPT

## 2018-10-19 PROCEDURE — 71045 X-RAY EXAM CHEST 1 VIEW: CPT

## 2018-10-19 PROCEDURE — 85025 COMPLETE CBC W/AUTO DIFF WBC: CPT | Performed by: EMERGENCY MEDICINE

## 2018-10-19 PROCEDURE — C1874 STENT, COATED/COV W/DEL SYS: HCPCS | Performed by: INTERNAL MEDICINE

## 2018-10-19 PROCEDURE — 25010000002 BIVALIRUDIN 5 MG/ML: Performed by: INTERNAL MEDICINE

## 2018-10-19 PROCEDURE — 36415 COLL VENOUS BLD VENIPUNCTURE: CPT | Performed by: EMERGENCY MEDICINE

## 2018-10-19 PROCEDURE — B2151ZZ FLUOROSCOPY OF LEFT HEART USING LOW OSMOLAR CONTRAST: ICD-10-PCS | Performed by: INTERNAL MEDICINE

## 2018-10-19 PROCEDURE — 84484 ASSAY OF TROPONIN QUANT: CPT | Performed by: EMERGENCY MEDICINE

## 2018-10-19 PROCEDURE — 93005 ELECTROCARDIOGRAM TRACING: CPT | Performed by: INTERNAL MEDICINE

## 2018-10-19 PROCEDURE — 25010000002 IOPAMIDOL 61 % SOLUTION: Performed by: INTERNAL MEDICINE

## 2018-10-19 PROCEDURE — 25010000002 HEPARIN (PORCINE) PER 1000 UNITS: Performed by: INTERNAL MEDICINE

## 2018-10-19 PROCEDURE — C1894 INTRO/SHEATH, NON-LASER: HCPCS | Performed by: INTERNAL MEDICINE

## 2018-10-19 PROCEDURE — B2111ZZ FLUOROSCOPY OF MULTIPLE CORONARY ARTERIES USING LOW OSMOLAR CONTRAST: ICD-10-PCS | Performed by: INTERNAL MEDICINE

## 2018-10-19 PROCEDURE — 027034Z DILATION OF CORONARY ARTERY, ONE ARTERY WITH DRUG-ELUTING INTRALUMINAL DEVICE, PERCUTANEOUS APPROACH: ICD-10-PCS | Performed by: INTERNAL MEDICINE

## 2018-10-19 PROCEDURE — C1725 CATH, TRANSLUMIN NON-LASER: HCPCS | Performed by: INTERNAL MEDICINE

## 2018-10-19 PROCEDURE — C1887 CATHETER, GUIDING: HCPCS | Performed by: INTERNAL MEDICINE

## 2018-10-19 PROCEDURE — 84484 ASSAY OF TROPONIN QUANT: CPT | Performed by: INTERNAL MEDICINE

## 2018-10-19 DEVICE — XIENCE SIERRA™ EVEROLIMUS ELUTING CORONARY STENT SYSTEM 2.75 MM X 18 MM / RAPID-EXCHANGE
Type: IMPLANTABLE DEVICE | Status: FUNCTIONAL
Brand: XIENCE SIERRA™

## 2018-10-19 RX ORDER — HYDROCODONE BITARTRATE AND ACETAMINOPHEN 10; 325 MG/1; MG/1
1 TABLET ORAL EVERY 6 HOURS PRN
Status: DISCONTINUED | OUTPATIENT
Start: 2018-10-19 | End: 2018-10-21 | Stop reason: HOSPADM

## 2018-10-19 RX ORDER — SODIUM CHLORIDE 9 MG/ML
75 INJECTION, SOLUTION INTRAVENOUS CONTINUOUS
Status: DISCONTINUED | OUTPATIENT
Start: 2018-10-19 | End: 2018-10-20

## 2018-10-19 RX ORDER — AMITRIPTYLINE HYDROCHLORIDE 25 MG/1
25 TABLET, FILM COATED ORAL NIGHTLY
Status: DISCONTINUED | OUTPATIENT
Start: 2018-10-19 | End: 2018-10-21 | Stop reason: HOSPADM

## 2018-10-19 RX ORDER — DIPHENHYDRAMINE HCL 25 MG
25 CAPSULE ORAL EVERY 6 HOURS PRN
Status: DISCONTINUED | OUTPATIENT
Start: 2018-10-19 | End: 2018-10-21 | Stop reason: HOSPADM

## 2018-10-19 RX ORDER — LIDOCAINE HYDROCHLORIDE 20 MG/ML
INJECTION, SOLUTION INFILTRATION; PERINEURAL AS NEEDED
Status: DISCONTINUED | OUTPATIENT
Start: 2018-10-19 | End: 2018-10-19 | Stop reason: HOSPADM

## 2018-10-19 RX ORDER — HYDROCHLOROTHIAZIDE 25 MG/1
25 TABLET ORAL DAILY
Status: DISCONTINUED | OUTPATIENT
Start: 2018-10-19 | End: 2018-10-21 | Stop reason: HOSPADM

## 2018-10-19 RX ORDER — NITROGLYCERIN 20 MG/100ML
5-200 INJECTION INTRAVENOUS
Status: DISCONTINUED | OUTPATIENT
Start: 2018-10-19 | End: 2018-10-20

## 2018-10-19 RX ORDER — NITROGLYCERIN 20 MG/100ML
INJECTION INTRAVENOUS
Status: COMPLETED
Start: 2018-10-19 | End: 2018-10-19

## 2018-10-19 RX ORDER — ALPRAZOLAM 0.5 MG/1
1 TABLET ORAL 2 TIMES DAILY PRN
Status: DISCONTINUED | OUTPATIENT
Start: 2018-10-19 | End: 2018-10-21 | Stop reason: HOSPADM

## 2018-10-19 RX ORDER — ALLOPURINOL 300 MG/1
300 TABLET ORAL DAILY
Status: DISCONTINUED | OUTPATIENT
Start: 2018-10-19 | End: 2018-10-21 | Stop reason: HOSPADM

## 2018-10-19 RX ORDER — NITROGLYCERIN 0.4 MG/1
0.4 TABLET SUBLINGUAL
Status: DISCONTINUED | OUTPATIENT
Start: 2018-10-19 | End: 2018-10-21 | Stop reason: HOSPADM

## 2018-10-19 RX ORDER — POTASSIUM CHLORIDE 750 MG/1
20 TABLET, EXTENDED RELEASE ORAL 2 TIMES DAILY WITH MEALS
Status: DISCONTINUED | OUTPATIENT
Start: 2018-10-19 | End: 2018-10-19

## 2018-10-19 RX ORDER — AMLODIPINE BESYLATE 10 MG/1
10 TABLET ORAL DAILY
Status: DISCONTINUED | OUTPATIENT
Start: 2018-10-19 | End: 2018-10-21 | Stop reason: HOSPADM

## 2018-10-19 RX ORDER — SODIUM CHLORIDE 9 MG/ML
100 INJECTION, SOLUTION INTRAVENOUS CONTINUOUS
Status: DISCONTINUED | OUTPATIENT
Start: 2018-10-19 | End: 2018-10-19

## 2018-10-19 RX ORDER — SODIUM CHLORIDE 0.9 % (FLUSH) 0.9 %
10 SYRINGE (ML) INJECTION AS NEEDED
Status: DISCONTINUED | OUTPATIENT
Start: 2018-10-19 | End: 2018-10-21 | Stop reason: HOSPADM

## 2018-10-19 RX ORDER — DICLOFENAC SODIUM 1 MG/ML
1 SOLUTION/ DROPS OPHTHALMIC 4 TIMES DAILY
Status: DISCONTINUED | OUTPATIENT
Start: 2018-10-19 | End: 2018-10-19

## 2018-10-19 RX ORDER — ASPIRIN 81 MG/1
81 TABLET ORAL DAILY
Status: DISCONTINUED | OUTPATIENT
Start: 2018-10-20 | End: 2018-10-21 | Stop reason: HOSPADM

## 2018-10-19 RX ORDER — ASPIRIN 81 MG/1
324 TABLET, CHEWABLE ORAL ONCE
Status: COMPLETED | OUTPATIENT
Start: 2018-10-19 | End: 2018-10-19

## 2018-10-19 RX ORDER — ATORVASTATIN CALCIUM 40 MG/1
40 TABLET, FILM COATED ORAL NIGHTLY
Status: DISCONTINUED | OUTPATIENT
Start: 2018-10-19 | End: 2018-10-21 | Stop reason: HOSPADM

## 2018-10-19 RX ORDER — CARVEDILOL 3.12 MG/1
3.12 TABLET ORAL EVERY 12 HOURS SCHEDULED
Status: DISCONTINUED | OUTPATIENT
Start: 2018-10-19 | End: 2018-10-20

## 2018-10-19 RX ORDER — NAPROXEN 250 MG/1
250 TABLET ORAL 2 TIMES DAILY WITH MEALS
Status: DISCONTINUED | OUTPATIENT
Start: 2018-10-19 | End: 2018-10-21 | Stop reason: HOSPADM

## 2018-10-19 RX ORDER — NITROGLYCERIN 0.4 MG/1
TABLET SUBLINGUAL
Status: COMPLETED
Start: 2018-10-19 | End: 2018-10-19

## 2018-10-19 RX ORDER — GABAPENTIN 100 MG/1
100 CAPSULE ORAL 3 TIMES DAILY
Status: DISCONTINUED | OUTPATIENT
Start: 2018-10-19 | End: 2018-10-21 | Stop reason: HOSPADM

## 2018-10-19 RX ORDER — DIPHENHYDRAMINE HYDROCHLORIDE 50 MG/ML
INJECTION INTRAMUSCULAR; INTRAVENOUS AS NEEDED
Status: DISCONTINUED | OUTPATIENT
Start: 2018-10-19 | End: 2018-10-19 | Stop reason: HOSPADM

## 2018-10-19 RX ORDER — POTASSIUM CHLORIDE 750 MG/1
20 CAPSULE, EXTENDED RELEASE ORAL 2 TIMES DAILY WITH MEALS
Status: DISCONTINUED | OUTPATIENT
Start: 2018-10-19 | End: 2018-10-21 | Stop reason: HOSPADM

## 2018-10-19 RX ADMIN — GABAPENTIN 100 MG: 100 CAPSULE ORAL at 21:49

## 2018-10-19 RX ADMIN — AMLODIPINE BESYLATE 10 MG: 10 TABLET ORAL at 13:10

## 2018-10-19 RX ADMIN — NITROGLYCERIN 5 MCG/MIN: 20 INJECTION INTRAVENOUS at 10:40

## 2018-10-19 RX ADMIN — NITROGLYCERIN 0.4 MG: 0.4 TABLET SUBLINGUAL at 10:30

## 2018-10-19 RX ADMIN — AMITRIPTYLINE HYDROCHLORIDE 25 MG: 25 TABLET, FILM COATED ORAL at 21:49

## 2018-10-19 RX ADMIN — SODIUM CHLORIDE 75 ML/HR: 9 INJECTION, SOLUTION INTRAVENOUS at 12:36

## 2018-10-19 RX ADMIN — ASPIRIN 81 MG CHEWABLE TABLET 324 MG: 81 TABLET CHEWABLE at 10:29

## 2018-10-19 RX ADMIN — CARVEDILOL 3.12 MG: 3.12 TABLET, FILM COATED ORAL at 13:10

## 2018-10-19 RX ADMIN — GABAPENTIN 100 MG: 100 CAPSULE ORAL at 16:22

## 2018-10-19 RX ADMIN — ATORVASTATIN CALCIUM 40 MG: 40 TABLET, FILM COATED ORAL at 21:49

## 2018-10-19 RX ADMIN — SODIUM CHLORIDE 75 ML/HR: 9 INJECTION, SOLUTION INTRAVENOUS at 10:28

## 2018-10-19 RX ADMIN — HYDROCODONE BITARTRATE AND ACETAMINOPHEN 0.5 TABLET: 10; 325 TABLET ORAL at 20:48

## 2018-10-19 RX ADMIN — CARVEDILOL 3.12 MG: 3.12 TABLET, FILM COATED ORAL at 21:49

## 2018-10-19 RX ADMIN — HYDROCHLOROTHIAZIDE 25 MG: 25 TABLET ORAL at 13:10

## 2018-10-19 RX ADMIN — ALLOPURINOL 300 MG: 300 TABLET ORAL at 13:10

## 2018-10-19 RX ADMIN — POTASSIUM CHLORIDE 20 MEQ: 750 CAPSULE, EXTENDED RELEASE ORAL at 17:55

## 2018-10-19 RX ADMIN — TICAGRELOR 90 MG: 90 TABLET ORAL at 21:48

## 2018-10-19 RX ADMIN — NAPROXEN 250 MG: 250 TABLET ORAL at 17:55

## 2018-10-19 NOTE — ED PROVIDER NOTES
"Subjective   Patient presents saying he was awakened about 1:30 AM with severe chest pain and diaphoresis.  Has continued all morning until came here.  No h/o CAD but has noticed \"skipped beat\" recently.        History provided by:  Patient   used: No    Chest Pain   Pain location:  Substernal area  Pain quality: dull and pressure    Pain radiates to:  Does not radiate  Pain severity:  Severe  Onset quality:  Sudden  Duration:  8 hours  Timing:  Constant  Progression:  Unchanged  Chronicity:  New  Context: not breathing, not drug use, not eating, not intercourse, not lifting, not movement, not raising an arm, not at rest, not stress and not trauma    Relieved by:  Nothing  Worsened by:  Nothing  Ineffective treatments:  None tried  Associated symptoms: diaphoresis    Associated symptoms: no abdominal pain, no AICD problem, no altered mental status, no anorexia, no anxiety, no back pain, no claudication, no dizziness, no fatigue, no fever, no headache, no heartburn, no lower extremity edema, no nausea, no numbness, no orthopnea, no shortness of breath, no syncope, no vomiting and no weakness    Risk factors: hypertension, male sex and smoking    Risk factors: no aortic disease, no birth control and no coronary artery disease        Review of Systems   Constitutional: Positive for diaphoresis. Negative for fatigue and fever.   HENT: Negative.    Respiratory: Negative.  Negative for shortness of breath.    Cardiovascular: Positive for chest pain. Negative for orthopnea, claudication and syncope.   Gastrointestinal: Negative.  Negative for abdominal pain, anorexia, heartburn, nausea and vomiting.   Genitourinary: Negative.    Musculoskeletal: Negative.  Negative for back pain.   Neurological: Negative.  Negative for dizziness, weakness, numbness and headaches.   Hematological: Negative.    Psychiatric/Behavioral: Negative.    All other systems reviewed and are negative.      Past Medical History: "   Diagnosis Date   • Abnormal EKG    • Arthritis    • Gout    • HTN (hypertension)    • Neuropathy        No Known Allergies    Past Surgical History:   Procedure Laterality Date   • ARM TENDON REPAIR     • EYE SURGERY Left        History reviewed. No pertinent family history.    Social History     Social History   • Marital status: Other     Social History Main Topics   • Smoking status: Current Every Day Smoker     Packs/day: 0.50     Types: Cigarettes   • Alcohol use Yes   • Drug use: Unknown     Other Topics Concern   • Not on file       Prior to Admission medications    Medication Sig Start Date End Date Taking? Authorizing Provider   allopurinol (ZYLOPRIM) 300 MG tablet Take 300 mg by mouth Daily.    Juan Bone MD   ALPRAZolam (XANAX) 1 MG tablet Take 1 mg by mouth 2 (Two) Times a Day As Needed for Anxiety.    Juan Bone MD   amitriptyline (ELAVIL) 25 MG tablet Take 25 mg by mouth Every Night.    Juan Bone MD   amLODIPine (NORVASC) 10 MG tablet Take 10 mg by mouth Daily.    Juan Bone MD   colchicine-probenecid (COL-BENEMID) 0.5-500 MG tablet Take 1 tablet by mouth Daily.    Juan Bone MD   diclofenac (VOLTAREN) 0.1 % ophthalmic solution 1 drop 4 (Four) Times a Day.    Juan Bone MD   diphenhydrAMINE (BENADRYL) 25 mg capsule Take 25 mg by mouth Every 6 (Six) Hours As Needed for Itching.    Juan Bone MD   gabapentin (NEURONTIN) 300 MG capsule Take 300 mg by mouth 3 (Three) Times a Day.    Juan Bone MD   hydrochlorothiazide (HYDRODIURIL) 25 MG tablet Take 25 mg by mouth Daily.    Juan Bone MD   HYDROcodone-acetaminophen (NORCO)  MG per tablet Take 1 tablet by mouth Every 6 (Six) Hours As Needed for Moderate Pain .    Juan Bone MD   lidocaine 3 % cream cream Apply  topically Every 4 (Four) Hours As Needed.    Juan Bone MD   potassium chloride (K-DUR,KLOR-CON) 20 MEQ CR tablet Take  20 mEq by mouth 2 (Two) Times a Day.    Provider, MD Juan       Medications   sodium chloride 0.9 % flush 10 mL (not administered)   nitroglycerin (NITROSTAT) SL tablet 0.4 mg (0.4 mg Sublingual Given 10/19/18 1030)   nitroglycerin 50 mg/250 mL (0.2 mg/mL) infusion (10 mcg/min Intravenous Rate/Dose Change 10/19/18 1050)   sodium chloride 0.9 % infusion (75 mL/hr Intravenous New Bag 10/19/18 1028)   aspirin chewable tablet 324 mg (324 mg Oral Given 10/19/18 1029)       Vitals:    10/19/18 1047   BP: 113/69   Pulse: 52   Resp:    Temp:    SpO2: 93%         Objective   Physical Exam   Constitutional: He is oriented to person, place, and time. He appears well-developed and well-nourished.   HENT:   Head: Normocephalic and atraumatic.   Neck: Normal range of motion. Neck supple.   Cardiovascular: Normal rate and regular rhythm.    Pulmonary/Chest: Effort normal and breath sounds normal.   Abdominal: Soft. Bowel sounds are normal.   Musculoskeletal: Normal range of motion.   Neurological: He is alert and oriented to person, place, and time.   Skin: Skin is warm and dry. Capillary refill takes less than 2 seconds.   Psychiatric: He has a normal mood and affect. His behavior is normal.   Nursing note and vitals reviewed.      Critical Care  Performed by: MARGARET DOMINGUEZ JR  Authorized by: MARGARET DOMINGUEZ JR     Critical care provider statement:     Critical care time (minutes):  30    Critical care start time:  10/19/2018 10:15 AM    Critical care end time:  10/19/2018 10:45 AM    Critical care time was exclusive of:  Separately billable procedures and treating other patients    Critical care was necessary to treat or prevent imminent or life-threatening deterioration of the following conditions:  Cardiac failure    Critical care was time spent personally by me on the following activities:  Blood draw for specimens, development of treatment plan with patient or surrogate, discussions with consultants, evaluation  of patient's response to treatment, examination of patient, interpretation of cardiac output measurements, obtaining history from patient or surrogate, ordering and performing treatments and interventions, ordering and review of laboratory studies, ordering and review of radiographic studies, pulse oximetry and re-evaluation of patient's condition    I assumed direction of critical care for this patient from another provider in my specialty: no               Lab Results (last 24 hours)     Procedure Component Value Units Date/Time    Comprehensive Metabolic Panel [[id number]] Updated:  10/19/18 1100    Specimen:  Blood     Troponin [[id number]] Updated:  10/19/18 1100    Specimen:  Blood     CBC & Differential [[id number]] Collected:  10/19/18 1031    Specimen:  Blood Updated:  10/19/18 1046    Narrative:       The following orders were created for panel order CBC & Differential.  Procedure                               Abnormality         Status                     ---------                               -----------         ------                     CBC Auto Differential[[id number]]        Abnormal            Final result                 Please view results for these tests on the individual orders.    CBC Auto Differential [[id number]]  (Abnormal) Collected:  10/19/18 1031    Specimen:  Blood Updated:  10/19/18 1046     WBC 12.83 (H) 10*3/mm3      RBC 5.26 10*6/mm3      Hemoglobin 19.3 (H) g/dL      Hematocrit 53.4 (H) %      .5 (H) fL      MCH 36.7 (H) pg      MCHC 36.1 (H) g/dL      RDW 14.2 %      RDW-SD 53.2 fl      MPV 9.7 fL      Platelets 283 10*3/mm3      Neutrophil % 85.8 (H) %      Lymphocyte % 5.4 (L) %      Monocyte % 7.3 %      Eosinophil % 0.1 %      Basophil % 0.5 %      Immature Grans % 0.9 %      Neutrophils, Absolute 11.01 (H) 10*3/mm3      Lymphocytes, Absolute 0.69 (L) 10*3/mm3      Monocytes, Absolute 0.94 10*3/mm3      Eosinophils, Absolute 0.01 10*3/mm3      Basophils, Absolute 0.06  10*3/mm3      Immature Grans, Absolute 0.12 (H) 10*3/mm3      nRBC 0.0 /100 WBC           XR Chest 1 View   Final Result   1. No acute cardiopulmonary process.       This report was finalized on 10/19/2018 10:59 by Dr. Rodger Ferrara MD.          ED Course  ED Course as of Oct 19 1108   Fri Oct 19, 2018   1105 Cardiology is taking to cath lab.  [TR]      ED Course User Index  [TR] Sesar Wells Jr., MD          MDM  Number of Diagnoses or Management Options  ST elevation myocardial infarction (STEMI), unspecified artery (CMS/HCC): new and requires workup     Amount and/or Complexity of Data Reviewed  Clinical lab tests: ordered and reviewed  Tests in the radiology section of CPT®: ordered and reviewed  Tests in the medicine section of CPT®: ordered and reviewed    Risk of Complications, Morbidity, and/or Mortality  Presenting problems: high  Diagnostic procedures: high  Management options: high    Critical Care  Total time providing critical care: 30-74 minutes    Patient Progress  Patient progress: stable      Final diagnoses:   ST elevation myocardial infarction (STEMI), unspecified artery (CMS/HCC)          Sesar Wells Jr., MD  10/19/18 1108

## 2018-10-19 NOTE — H&P
Chief Complaint   Patient presents with   • Chest Pain       Subjective .     History of present illness:  Hernan Su is a 78 y.o. yo male with history of HTN who presents today with SSCP since 1:30 am. Described as a dull pain radiating into the shoulder and associated with SOB.  Chief Complaint   Patient presents with   • Chest Pain   .    History  Past Medical History:   Diagnosis Date   • Abnormal EKG    • Arthritis    • Gout    • HTN (hypertension)    • Neuropathy    ,   Past Surgical History:   Procedure Laterality Date   • ARM TENDON REPAIR     • EYE SURGERY Left    ,   History reviewed. No pertinent family history.,   Social History   Substance Use Topics   • Smoking status: Current Every Day Smoker     Packs/day: 0.50     Types: Cigarettes   • Smokeless tobacco: Not on file   • Alcohol use Yes   ,     Medications  Current Facility-Administered Medications   Medication Dose Route Frequency Provider Last Rate Last Dose   • nitroglycerin (NITROSTAT) SL tablet 0.4 mg  0.4 mg Sublingual Q5 Min PRN Sesar Wells Jr., MD   0.4 mg at 10/19/18 1030   • nitroglycerin 50 mg/250 mL (0.2 mg/mL) infusion  5-200 mcg/min Intravenous Titrated Sesar Wells Jr., MD 3 mL/hr at 10/19/18 1050 10 mcg/min at 10/19/18 1050   • sodium chloride 0.9 % flush 10 mL  10 mL Intravenous PRN Sesar Wells Jr., MD       • sodium chloride 0.9 % infusion  75 mL/hr Intravenous Continuous Sesar Wells Jr., MD 75 mL/hr at 10/19/18 1028 75 mL/hr at 10/19/18 1028       Allergies:  Patient has no known allergies.    Review of Systems  Review of Systems   Constitution: Positive for weakness.   HENT: Negative for nosebleeds.    Cardiovascular: Positive for chest pain. Negative for claudication, dyspnea on exertion, irregular heartbeat, leg swelling, near-syncope, orthopnea, palpitations, paroxysmal nocturnal dyspnea and syncope.   Respiratory: Negative for cough, hemoptysis and shortness of breath.    Musculoskeletal: Positive  "for arthritis.   Gastrointestinal: Negative for dysphagia, hematemesis and melena.   Genitourinary: Negative for hematuria.   All other systems reviewed and are negative.      Objective     Physical Exam:  Patient Vitals for the past 24 hrs:   BP Temp Pulse Resp SpO2 Height Weight   10/19/18 1146 127/78 - 78 16 98 % - -   10/19/18 1119 146/84 - (!) 44 16 98 % - -   10/19/18 1112 - - - - 96 % - -   10/19/18 1102 124/77 - 53 - 97 % - -   10/19/18 1047 113/69 - 52 - 93 % - -   10/19/18 1044 112/64 - (!) 49 - 93 % - -   10/19/18 1035 115/77 - 51 14 93 % - -   10/19/18 1021 144/83 - 54 - 94 % - -   10/19/18 1013 129/74 96.9 °F (36.1 °C) 53 16 99 % 177.8 cm (70\") 67.1 kg (148 lb)     Physical Exam   Constitutional: He is oriented to person, place, and time. He appears well-nourished. No distress.   HENT:   Head: Normocephalic and atraumatic.   Eyes: No scleral icterus.   Neck: Normal range of motion. Neck supple.   Cardiovascular: Normal rate, regular rhythm, S1 normal and S2 normal.  Exam reveals no gallop and no friction rub.    No murmur heard.  Pulmonary/Chest: Effort normal and breath sounds normal. No respiratory distress. He has no wheezes. He has no rales. He exhibits no tenderness.   Abdominal: Soft. Bowel sounds are normal. He exhibits no distension. There is no tenderness. There is no rebound and no guarding.   Musculoskeletal: Normal range of motion. He exhibits no edema.   Neurological: He is alert and oriented to person, place, and time. No cranial nerve deficit.   Skin: Skin is warm and dry. No rash noted. He is not diaphoretic. No erythema.   Psychiatric: He has a normal mood and affect. His behavior is normal.       Results Review:   I reviewed the patient's new clinical results.  Lab Results (last 24 hours)     Procedure Component Value Units Date/Time    Troponin [[id number]]  (Abnormal) Collected:  10/19/18 1109    Specimen:  Blood Updated:  10/19/18 1158     Troponin I 2.110 (C) ng/mL     [facility] " [[id number]] Collected:  10/19/18 1031    Specimen:  Blood Updated:  10/19/18 1145    Narrative:       The following orders were created for panel order [facility].  Procedure                               Abnormality         Status                     ---------                               -----------         ------                     Light Blue Top[[id number]]                                   Final result               Green Top (Gel)[[id number]]                                  Final result               Lavender Top[[id number]]                                     Final result               Red Top[[id number]]                                                                       Please view results for these tests on the individual orders.    Light Blue Top [[id number]] Collected:  10/19/18 1031    Specimen:  Blood Updated:  10/19/18 1145     Extra Tube hold for add-on     Comment: Auto resulted       Green Top (Gel) [[id number]] Collected:  10/19/18 1031    Specimen:  Blood Updated:  10/19/18 1145     Extra Tube Hold for add-ons.     Comment: Auto resulted.       Lavender Top [[id number]] Collected:  10/19/18 1031    Specimen:  Blood Updated:  10/19/18 1145     Extra Tube hold for add-on     Comment: Auto resulted       Comprehensive Metabolic Panel [[id number]]  (Abnormal) Collected:  10/19/18 1109    Specimen:  Blood Updated:  10/19/18 1131     Glucose 139 (H) mg/dL      BUN 11 mg/dL      Creatinine 0.88 mg/dL      Sodium 132 (L) mmol/L      Potassium 3.6 mmol/L      Chloride 91 (L) mmol/L      CO2 29.0 mmol/L      Calcium 9.8 mg/dL      Total Protein 7.3 g/dL      Albumin 4.00 g/dL      ALT (SGPT) 33 U/L      AST (SGOT) 72 (H) U/L      Alkaline Phosphatase 115 U/L      Total Bilirubin 1.5 (H) mg/dL      eGFR Non African Amer 84 mL/min/1.73      Globulin 3.3 gm/dL      A/G Ratio 1.2 g/dL      BUN/Creatinine Ratio 12.5     Anion Gap 12.0 mmol/L     Narrative:       The MDRD GFR formula is only valid for adults with  stable renal function between ages 18 and 70.    Red Top [[id number]] Updated:  10/19/18 1100    Specimen:  Blood     CBC & Differential [[id number]] Collected:  10/19/18 1031    Specimen:  Blood Updated:  10/19/18 1046    Narrative:       The following orders were created for panel order CBC & Differential.  Procedure                               Abnormality         Status                     ---------                               -----------         ------                     CBC Auto Differential[[id number]]        Abnormal            Final result                 Please view results for these tests on the individual orders.    CBC Auto Differential [[id number]]  (Abnormal) Collected:  10/19/18 1031    Specimen:  Blood Updated:  10/19/18 1046     WBC 12.83 (H) 10*3/mm3      RBC 5.26 10*6/mm3      Hemoglobin 19.3 (H) g/dL      Hematocrit 53.4 (H) %      .5 (H) fL      MCH 36.7 (H) pg      MCHC 36.1 (H) g/dL      RDW 14.2 %      RDW-SD 53.2 fl      MPV 9.7 fL      Platelets 283 10*3/mm3      Neutrophil % 85.8 (H) %      Lymphocyte % 5.4 (L) %      Monocyte % 7.3 %      Eosinophil % 0.1 %      Basophil % 0.5 %      Immature Grans % 0.9 %      Neutrophils, Absolute 11.01 (H) 10*3/mm3      Lymphocytes, Absolute 0.69 (L) 10*3/mm3      Monocytes, Absolute 0.94 10*3/mm3      Eosinophils, Absolute 0.01 10*3/mm3      Basophils, Absolute 0.06 10*3/mm3      Immature Grans, Absolute 0.12 (H) 10*3/mm3      nRBC 0.0 /100 WBC         Imaging Results (last 24 hours)     Procedure Component Value Units Date/Time    XR Chest 1 View [[id number]] Collected:  10/19/18 1057     Updated:  10/19/18 1102    Narrative:       EXAMINATION: XR CHEST 1 VW-. 10/19/2018 10:57 AM CDT     CHEST, ONE VIEW:     HISTORY: Chest pain     COMPARISON: 1/8/2009     A single frontal chest radiograph was obtained.     FINDINGS:     The lungs are clear without infiltrates.     The heart is normal in size, pulmonary circulation appropriate, without  heart  failure.     The bony structures are intact. Deformity of the left mid clavicle  observed compatible fracture with malunion.                                     Impression:       1. No acute cardiopulmonary process.     This report was finalized on 10/19/2018 10:59 by Dr. Rodger Ferrara MD.        No results found for: ECHOEFEST    I have reviewed his ECG which reveals SR with inferior lateral ST elevation  Assessment/Plan     * No active hospital problems. *    New problems that need assessment:  1. Inf STEMI, plan emergent cath  New problems that are stable:  Tobacco abuse  HTN  Arthritis    MDM High Complexity

## 2018-10-19 NOTE — PLAN OF CARE
Problem: Fall Risk (Adult)  Goal: Identify Related Risk Factors and Signs and Symptoms  Outcome: Ongoing (interventions implemented as appropriate)    Goal: Absence of Fall  Outcome: Ongoing (interventions implemented as appropriate)      Problem: Skin Injury Risk (Adult)  Goal: Identify Related Risk Factors and Signs and Symptoms  Outcome: Ongoing (interventions implemented as appropriate)    Goal: Skin Health and Integrity  Outcome: Ongoing (interventions implemented as appropriate)      Problem: Cardiac: ACS (Acute Coronary Syndrome) (Adult)  Goal: Signs and Symptoms of Listed Potential Problems Will be Absent, Minimized or Managed (Cardiac: ACS)  Outcome: Ongoing (interventions implemented as appropriate)      Problem: Cardiac Catheterization (Diagnostic/Interventional) (Adult)  Goal: Signs and Symptoms of Listed Potential Problems Will be Absent, Minimized or Managed (Cardiac Catheterization)  Outcome: Ongoing (interventions implemented as appropriate)    Goal: Anesthesia/Sedation Recovery  Outcome: Ongoing (interventions implemented as appropriate)      Problem: Patient Care Overview  Goal: Plan of Care Review  Outcome: Ongoing (interventions implemented as appropriate)    Goal: Individualization and Mutuality  Outcome: Ongoing (interventions implemented as appropriate)    Goal: Discharge Needs Assessment  Outcome: Ongoing (interventions implemented as appropriate)    Goal: Interprofessional Rounds/Family Conf  Outcome: Ongoing (interventions implemented as appropriate)

## 2018-10-20 ENCOUNTER — APPOINTMENT (OUTPATIENT)
Dept: CARDIOLOGY | Facility: HOSPITAL | Age: 78
End: 2018-10-20
Attending: INTERNAL MEDICINE

## 2018-10-20 LAB
ANION GAP SERPL CALCULATED.3IONS-SCNC: 6 MMOL/L (ref 4–13)
ARTICHOKE IGE QN: 38 MG/DL (ref 0–99)
BUN BLD-MCNC: 17 MG/DL (ref 5–21)
BUN/CREAT SERPL: 19.3 (ref 7–25)
CALCIUM SPEC-SCNC: 9 MG/DL (ref 8.4–10.4)
CHLORIDE SERPL-SCNC: 95 MMOL/L (ref 98–110)
CHOLEST SERPL-MCNC: 114 MG/DL (ref 130–200)
CO2 SERPL-SCNC: 30 MMOL/L (ref 24–31)
CREAT BLD-MCNC: 0.88 MG/DL (ref 0.5–1.4)
DEPRECATED RDW RBC AUTO: 51.1 FL (ref 40–54)
ERYTHROCYTE [DISTWIDTH] IN BLOOD BY AUTOMATED COUNT: 13.9 % (ref 12–15)
GFR SERPL CREATININE-BSD FRML MDRD: 84 ML/MIN/1.73
GLUCOSE BLD-MCNC: 110 MG/DL (ref 70–100)
HBA1C MFR BLD: 4.3 %
HCT VFR BLD AUTO: 42.8 % (ref 40–52)
HDLC SERPL-MCNC: 61 MG/DL
HGB BLD-MCNC: 15.7 G/DL (ref 14–18)
LDLC/HDLC SERPL: 0.54 {RATIO}
MCH RBC QN AUTO: 37 PG (ref 28–32)
MCHC RBC AUTO-ENTMCNC: 36.7 G/DL (ref 33–36)
MCV RBC AUTO: 100.9 FL (ref 82–95)
PLATELET # BLD AUTO: 204 10*3/MM3 (ref 130–400)
PMV BLD AUTO: 10 FL (ref 6–12)
POTASSIUM BLD-SCNC: 3.1 MMOL/L (ref 3.5–5.3)
RBC # BLD AUTO: 4.24 10*6/MM3 (ref 4.8–5.9)
SODIUM BLD-SCNC: 131 MMOL/L (ref 135–145)
TRIGL SERPL-MCNC: 100 MG/DL (ref 0–149)
TROPONIN I SERPL-MCNC: 230 NG/ML (ref 0–0.03)
WBC NRBC COR # BLD: 9.8 10*3/MM3 (ref 4.8–10.8)

## 2018-10-20 PROCEDURE — 99238 HOSP IP/OBS DSCHRG MGMT 30/<: CPT | Performed by: PHYSICIAN ASSISTANT

## 2018-10-20 PROCEDURE — 93306 TTE W/DOPPLER COMPLETE: CPT

## 2018-10-20 PROCEDURE — 93306 TTE W/DOPPLER COMPLETE: CPT | Performed by: INTERNAL MEDICINE

## 2018-10-20 PROCEDURE — 83036 HEMOGLOBIN GLYCOSYLATED A1C: CPT | Performed by: INTERNAL MEDICINE

## 2018-10-20 PROCEDURE — 99232 SBSQ HOSP IP/OBS MODERATE 35: CPT | Performed by: INTERNAL MEDICINE

## 2018-10-20 PROCEDURE — 85027 COMPLETE CBC AUTOMATED: CPT | Performed by: INTERNAL MEDICINE

## 2018-10-20 PROCEDURE — 80048 BASIC METABOLIC PNL TOTAL CA: CPT | Performed by: INTERNAL MEDICINE

## 2018-10-20 PROCEDURE — 80061 LIPID PANEL: CPT | Performed by: INTERNAL MEDICINE

## 2018-10-20 PROCEDURE — 25810000003 SODIUM CHLORIDE 0.9 % WITH KCL 20 MEQ 20-0.9 MEQ/L-% SOLUTION: Performed by: INTERNAL MEDICINE

## 2018-10-20 PROCEDURE — 84484 ASSAY OF TROPONIN QUANT: CPT | Performed by: INTERNAL MEDICINE

## 2018-10-20 RX ORDER — LIDOCAINE 5% 5 G/100G
1 CREAM TOPICAL 3 TIMES DAILY PRN
COMMUNITY
End: 2021-01-01 | Stop reason: HOSPADM

## 2018-10-20 RX ORDER — SODIUM CHLORIDE AND POTASSIUM CHLORIDE 150; 900 MG/100ML; MG/100ML
50 INJECTION, SOLUTION INTRAVENOUS CONTINUOUS
Status: DISCONTINUED | OUTPATIENT
Start: 2018-10-20 | End: 2018-10-21 | Stop reason: HOSPADM

## 2018-10-20 RX ORDER — DICLOFENAC SODIUM 75 MG/1
75 TABLET, DELAYED RELEASE ORAL 2 TIMES DAILY
COMMUNITY
End: 2018-10-21 | Stop reason: HOSPADM

## 2018-10-20 RX ORDER — GABAPENTIN 300 MG/1
300 CAPSULE ORAL 4 TIMES DAILY
COMMUNITY
End: 2021-01-01 | Stop reason: HOSPADM

## 2018-10-20 RX ADMIN — POTASSIUM CHLORIDE 20 MEQ: 750 CAPSULE, EXTENDED RELEASE ORAL at 17:43

## 2018-10-20 RX ADMIN — AMITRIPTYLINE HYDROCHLORIDE 25 MG: 25 TABLET, FILM COATED ORAL at 20:31

## 2018-10-20 RX ADMIN — ALPRAZOLAM 1 MG: 0.5 TABLET ORAL at 01:28

## 2018-10-20 RX ADMIN — GABAPENTIN 100 MG: 100 CAPSULE ORAL at 17:43

## 2018-10-20 RX ADMIN — TICAGRELOR 90 MG: 90 TABLET ORAL at 09:00

## 2018-10-20 RX ADMIN — GABAPENTIN 100 MG: 100 CAPSULE ORAL at 09:39

## 2018-10-20 RX ADMIN — METOPROLOL TARTRATE 12.5 MG: 25 TABLET, FILM COATED ORAL at 20:31

## 2018-10-20 RX ADMIN — METOPROLOL TARTRATE 12.5 MG: 25 TABLET, FILM COATED ORAL at 11:24

## 2018-10-20 RX ADMIN — NAPROXEN 250 MG: 250 TABLET ORAL at 09:37

## 2018-10-20 RX ADMIN — GABAPENTIN 100 MG: 100 CAPSULE ORAL at 20:31

## 2018-10-20 RX ADMIN — POTASSIUM CHLORIDE AND SODIUM CHLORIDE 50 ML/HR: 900; 150 INJECTION, SOLUTION INTRAVENOUS at 11:21

## 2018-10-20 RX ADMIN — ATORVASTATIN CALCIUM 40 MG: 40 TABLET, FILM COATED ORAL at 20:31

## 2018-10-20 RX ADMIN — TICAGRELOR 90 MG: 90 TABLET ORAL at 20:31

## 2018-10-20 RX ADMIN — POTASSIUM CHLORIDE 20 MEQ: 750 CAPSULE, EXTENDED RELEASE ORAL at 09:37

## 2018-10-20 RX ADMIN — SODIUM CHLORIDE 75 ML/HR: 9 INJECTION, SOLUTION INTRAVENOUS at 01:26

## 2018-10-20 RX ADMIN — NAPROXEN 250 MG: 250 TABLET ORAL at 17:43

## 2018-10-20 RX ADMIN — ASPIRIN 81 MG: 81 TABLET ORAL at 09:37

## 2018-10-20 RX ADMIN — ALLOPURINOL 300 MG: 300 TABLET ORAL at 09:37

## 2018-10-20 NOTE — PLAN OF CARE
Problem: Patient Care Overview  Goal: Plan of Care Review   10/20/18 7747   OTHER   Outcome Summary Pt has had a good night. No c/o chest pain. Pt has chronic neuropathy pain to lower ext and hip pain. Norco 5 mg given for pain along with scheduled Neurontin. Xanax 1 mg to help with sleep. Educated pt on Brilinta, Coreg, and Lipitor. SBP 80's after Coreg, but Map remained greater than 65. Right groin is soft, pulses palpable.

## 2018-10-20 NOTE — PROGRESS NOTES
Discharge Planning Assessment  [facility]     Patient Name: Hernan Su  MRN: [id number]  Today's Date: 10/20/2018    Admit Date: 10/19/2018          Discharge Needs Assessment     Row Name 10/20/18 1359       Living Environment    Lives With friend(s)    Name(s) of Who Lives With Patient CLAUDIA NAIDU    Current Living Arrangements home/apartment/condo    Primary Care Provided by self    Provides Primary Care For no one    Family Caregiver if Needed none    Quality of Family Relationships helpful    Able to Return to Prior Arrangements yes       Resource/Environmental Concerns    Resource/Environmental Concerns none    Transportation Concerns car, none       Transition Planning    Patient/Family Anticipated Services at Transition none    Transportation Anticipated family or friend will provide       Discharge Needs Assessment    Readmission Within the Last 30 Days no previous admission in last 30 days    Concerns to be Addressed no discharge needs identified    Equipment Currently Used at Home none    Anticipated Changes Related to Illness none    Equipment Needed After Discharge none    Offered/Gave Vendor List no            Discharge Plan    No documentation.       Destination     No service coordination in this encounter.      Durable Medical Equipment     No service coordination in this encounter.      Dialysis/Infusion     No service coordination in this encounter.      Home Medical Care     No service coordination in this encounter.      Social Care     No service coordination in this encounter.                Demographic Summary    No documentation.           Functional Status    No documentation.           Psychosocial    No documentation.           Abuse/Neglect    No documentation.           Legal    No documentation.           Substance Abuse    No documentation.           Patient Forms    No documentation.         Yessenia Nye RN

## 2018-10-20 NOTE — PROGRESS NOTES
"  Chief Complaint   Patient presents with   • Chest Pain     S: The patient presented yesterday as an acute inferior ST segment elevation myocardial infarction.  He did undergo emergent cardiac catheterization and PCI to the right coronary artery by Dr. Joshua.  He has done well overnight.  No significant chest pain this morning.  Blood pressure has been somewhat low but the patient has otherwise been asymptomatic.    Medications: Reviewed    Review of Systems: All pertinent negative and positives as noted above.  Otherwise, all systems reviewed and found to be negative.    Telemetry: No significant rhythm abnormalities overnight.    O:  BP (!) 87/70   Pulse 74   Temp 98.2 °F (36.8 °C) (Oral)   Resp 18   Ht 177.8 cm (70\")   Wt 67.7 kg (149 lb 4.8 oz)   SpO2 97%   BMI 21.42 kg/m²     Temp:  [96.9 °F (36.1 °C)-99 °F (37.2 °C)] 98.2 °F (36.8 °C)  Heart Rate:  [] 74  Resp:  [14-23] 18  BP: ()/() 87/70    Gen: NAD  CV: RRR  Pulm: CTAB  Gi: s, nt, nd, +bs  Ext: cardiac cath site ok, no c/c/e    Diagnostic Data:    Lab Results   Component Value Date    WBC 9.80 10/20/2018    HGB 15.7 10/20/2018    HCT 42.8 10/20/2018    .9 (H) 10/20/2018     10/20/2018     Lab Results   Component Value Date    GLUCOSE 110 (H) 10/20/2018    CALCIUM 9.0 10/20/2018     (L) 10/20/2018    K 3.1 (L) 10/20/2018    CO2 30.0 10/20/2018    CL 95 (L) 10/20/2018    BUN 17 10/20/2018    CREATININE 0.88 10/20/2018    EGFRIFNONA 84 10/20/2018    BCR 19.3 10/20/2018    ANIONGAP 6.0 10/20/2018     Lab Results   Component Value Date    CHOL 114 (L) 10/20/2018    TRIG 100 10/20/2018    HDL 61 10/20/2018    LDL 38 10/20/2018     Cardiac Cath:    Hemodynamics:    #1 left ventricular pressure 120/15                                    #2 aortic pressure 120/60                                    #3 There was no significant gradient on aortic valve pullback     Ventriculography: Left ventricular left ventricular ejection " fraction is 55%.  There is inferior wall hypokinesis. No significant MR     Selective Coronary Angiography:  #1.  The left main coronary artery is an average size vessel with no significant disease  #2.  The left anterior descending coronary artery is a type III vessel with one diagonal branch.  There is no significant disease.  #3.  The left circumflex coronary artery is an average size vessel with one large obtuse marginal branch that bifurcates.  There is no significant disease noted.   #4  The right coronary artery is totally occluded in the mid portion     Acute Infarct PCI  Guiding catheter used 6 [place] JR4  Guidewires a 0.014 PT graphix   Comment: The wire was successfully advanced through the occlusion and advanced distally. Initially a 2.5 mm balloon was used to predilated the occlusion. A 2.5 x 18 mm Xience drug-eluting stent was then deployed in the stenosis at 15 jessica for 30 seconds.  This resulted in no residual stenosis and MARCY-2 flow distally     Conclusion:  #1 status post successful acute infarct PCI of the RCA using a drug-eluting stent   #2 left anterior ejection fraction 55%    ASSESSMENT/PLAN:    1.  Acute inferior ST segment elevation myocardial infarction due to right coronary artery occlusion, now status post PCI with drug-eluting stent  2.  Coronary artery disease in native coronary artery  3.  Essential hypertension  4.  Peripheral neuropathy  5.  Osteoarthritis  6.  Hyponatremia  7.  Hypokalemia    - The patient is clinically stable this morning and stable for transfer to the telemetry floor.  - Continue dual antiplatelet therapy, hold beta blocker at this time given relatively low blood pressure readings but likely reinstitute at a lower dose or alternative beta blocker (perhaps metoprolol) tomorrow, continue high intensity statin therapy.  - Replace potassium today and continue to monitor  - Monitor sodium level  - Cardiac rehabilitation referral upon discharge  - Possibly home tomorrow  if the patient remains stable overnight

## 2018-10-20 NOTE — PLAN OF CARE
Problem: Patient Care Overview  Goal: Plan of Care Review  Outcome: Ongoing (interventions implemented as appropriate)   10/20/18 2656   Coping/Psychosocial   Plan of Care Reviewed With patient   Plan of Care Review   Progress improving   OTHER   Outcome Summary Pt has had a good night. No c/o chest pain. Pt has chronic neuropathy pain to lower ext and hip pain. Norco 5 mg given for pain along with scheduled Neurontin. Xanax 1 mg to help with sleep. Educated pt on Brilinta, Coreg, and Lipitor. SBP 80's after Coreg, but Map remained greater than 65. Right groin is soft, pulses palpable.      Goal: Individualization and Mutuality  Outcome: Ongoing (interventions implemented as appropriate)    Goal: Discharge Needs Assessment  Outcome: Ongoing (interventions implemented as appropriate)    Goal: Interprofessional Rounds/Family Conf  Outcome: Ongoing (interventions implemented as appropriate)

## 2018-10-20 NOTE — PLAN OF CARE
Problem: Fall Risk (Adult)  Goal: Identify Related Risk Factors and Signs and Symptoms  Outcome: Ongoing (interventions implemented as appropriate)    Goal: Absence of Fall  Outcome: Ongoing (interventions implemented as appropriate)      Problem: Skin Injury Risk (Adult)  Goal: Identify Related Risk Factors and Signs and Symptoms  Outcome: Ongoing (interventions implemented as appropriate)    Goal: Skin Health and Integrity  Outcome: Ongoing (interventions implemented as appropriate)      Problem: Cardiac: ACS (Acute Coronary Syndrome) (Adult)  Goal: Signs and Symptoms of Listed Potential Problems Will be Absent, Minimized or Managed (Cardiac: ACS)  Outcome: Ongoing (interventions implemented as appropriate)      Problem: Cardiac Catheterization (Diagnostic/Interventional) (Adult)  Goal: Signs and Symptoms of Listed Potential Problems Will be Absent, Minimized or Managed (Cardiac Catheterization)  Outcome: Ongoing (interventions implemented as appropriate)    Goal: Anesthesia/Sedation Recovery  Outcome: Ongoing (interventions implemented as appropriate)

## 2018-10-21 VITALS
HEART RATE: 78 BPM | BODY MASS INDEX: 21.79 KG/M2 | WEIGHT: 152.2 LBS | OXYGEN SATURATION: 96 % | HEIGHT: 70 IN | DIASTOLIC BLOOD PRESSURE: 88 MMHG | RESPIRATION RATE: 16 BRPM | TEMPERATURE: 98.8 F | SYSTOLIC BLOOD PRESSURE: 126 MMHG

## 2018-10-21 LAB
ANION GAP SERPL CALCULATED.3IONS-SCNC: 6 MMOL/L (ref 4–13)
BH CV ECHO MEAS - AO MAX PG (FULL): 1.9 MMHG
BH CV ECHO MEAS - AO MAX PG: 4.2 MMHG
BH CV ECHO MEAS - AO MEAN PG (FULL): 1 MMHG
BH CV ECHO MEAS - AO MEAN PG: 2 MMHG
BH CV ECHO MEAS - AO ROOT AREA (BSA CORRECTED): 1.9
BH CV ECHO MEAS - AO ROOT AREA: 9.6 CM^2
BH CV ECHO MEAS - AO ROOT DIAM: 3.5 CM
BH CV ECHO MEAS - AO V2 MAX: 102 CM/SEC
BH CV ECHO MEAS - AO V2 MEAN: 69.3 CM/SEC
BH CV ECHO MEAS - AO V2 VTI: 18.5 CM
BH CV ECHO MEAS - AVA(I,A): 2.5 CM^2
BH CV ECHO MEAS - AVA(I,D): 2.5 CM^2
BH CV ECHO MEAS - AVA(V,A): 2.3 CM^2
BH CV ECHO MEAS - AVA(V,D): 2.3 CM^2
BH CV ECHO MEAS - BSA(HAYCOCK): 1.8 M^2
BH CV ECHO MEAS - BSA: 1.8 M^2
BH CV ECHO MEAS - BZI_BMI: 21.4 KILOGRAMS/M^2
BH CV ECHO MEAS - BZI_METRIC_HEIGHT: 177.8 CM
BH CV ECHO MEAS - BZI_METRIC_WEIGHT: 67.6 KG
BH CV ECHO MEAS - EDV(CUBED): 74.1 ML
BH CV ECHO MEAS - EDV(MOD-SP4): 58.4 ML
BH CV ECHO MEAS - EDV(TEICH): 78.6 ML
BH CV ECHO MEAS - EF(CUBED): 51.5 %
BH CV ECHO MEAS - EF(MOD-SP4): 45.4 %
BH CV ECHO MEAS - EF(TEICH): 43.8 %
BH CV ECHO MEAS - ESV(CUBED): 35.9 ML
BH CV ECHO MEAS - ESV(MOD-SP4): 31.9 ML
BH CV ECHO MEAS - ESV(TEICH): 44.1 ML
BH CV ECHO MEAS - FS: 21.4 %
BH CV ECHO MEAS - IVS/LVPW: 0.9
BH CV ECHO MEAS - IVSD: 0.9 CM
BH CV ECHO MEAS - LA DIMENSION: 2.7 CM
BH CV ECHO MEAS - LA/AO: 0.77
BH CV ECHO MEAS - LAT PEAK E' VEL: 8.9 CM/SEC
BH CV ECHO MEAS - LV DIASTOLIC VOL/BSA (35-75): 31.7 ML/M^2
BH CV ECHO MEAS - LV MASS(C)D: 127.8 GRAMS
BH CV ECHO MEAS - LV MASS(C)DI: 69.4 GRAMS/M^2
BH CV ECHO MEAS - LV MAX PG: 2.3 MMHG
BH CV ECHO MEAS - LV MEAN PG: 1 MMHG
BH CV ECHO MEAS - LV SYSTOLIC VOL/BSA (12-30): 17.3 ML/M^2
BH CV ECHO MEAS - LV V1 MAX: 75.4 CM/SEC
BH CV ECHO MEAS - LV V1 MEAN: 49.7 CM/SEC
BH CV ECHO MEAS - LV V1 VTI: 14.9 CM
BH CV ECHO MEAS - LVIDD: 4.2 CM
BH CV ECHO MEAS - LVIDS: 3.3 CM
BH CV ECHO MEAS - LVLD AP4: 7.7 CM
BH CV ECHO MEAS - LVLS AP4: 6.9 CM
BH CV ECHO MEAS - LVOT AREA (M): 3.1 CM^2
BH CV ECHO MEAS - LVOT AREA: 3.1 CM^2
BH CV ECHO MEAS - LVOT DIAM: 2 CM
BH CV ECHO MEAS - LVPWD: 1 CM
BH CV ECHO MEAS - MED PEAK E' VEL: 2.72 CM/SEC
BH CV ECHO MEAS - MV A MAX VEL: 67.4 CM/SEC
BH CV ECHO MEAS - MV DEC TIME: 0.42 SEC
BH CV ECHO MEAS - MV E MAX VEL: 41.7 CM/SEC
BH CV ECHO MEAS - MV E/A: 0.62
BH CV ECHO MEAS - SI(AO): 96.6 ML/M^2
BH CV ECHO MEAS - SI(CUBED): 20.7 ML/M^2
BH CV ECHO MEAS - SI(LVOT): 25.4 ML/M^2
BH CV ECHO MEAS - SI(MOD-SP4): 14.4 ML/M^2
BH CV ECHO MEAS - SI(TEICH): 18.7 ML/M^2
BH CV ECHO MEAS - SV(AO): 178 ML
BH CV ECHO MEAS - SV(CUBED): 38.2 ML
BH CV ECHO MEAS - SV(LVOT): 46.8 ML
BH CV ECHO MEAS - SV(MOD-SP4): 26.5 ML
BH CV ECHO MEAS - SV(TEICH): 34.4 ML
BH CV ECHO MEASUREMENTS AVERAGE E/E' RATIO: 7.18
BUN BLD-MCNC: 15 MG/DL (ref 5–21)
BUN/CREAT SERPL: 23.1 (ref 7–25)
CALCIUM SPEC-SCNC: 9.1 MG/DL (ref 8.4–10.4)
CHLORIDE SERPL-SCNC: 97 MMOL/L (ref 98–110)
CO2 SERPL-SCNC: 30 MMOL/L (ref 24–31)
CREAT BLD-MCNC: 0.65 MG/DL (ref 0.5–1.4)
GFR SERPL CREATININE-BSD FRML MDRD: 119 ML/MIN/1.73
GLUCOSE BLD-MCNC: 92 MG/DL (ref 70–100)
LEFT ATRIUM VOLUME INDEX: 23.4 ML/M2
LEFT ATRIUM VOLUME: 43.1 CM3
LV EF 2D ECHO EST: 45 %
POTASSIUM BLD-SCNC: 3.7 MMOL/L (ref 3.5–5.3)
SODIUM BLD-SCNC: 133 MMOL/L (ref 135–145)

## 2018-10-21 PROCEDURE — 25810000003 SODIUM CHLORIDE 0.9 % WITH KCL 20 MEQ 20-0.9 MEQ/L-% SOLUTION: Performed by: INTERNAL MEDICINE

## 2018-10-21 PROCEDURE — 80048 BASIC METABOLIC PNL TOTAL CA: CPT | Performed by: INTERNAL MEDICINE

## 2018-10-21 RX ORDER — NITROGLYCERIN 0.4 MG/1
0.4 TABLET SUBLINGUAL
Qty: 30 TABLET | Refills: 12 | Status: SHIPPED | OUTPATIENT
Start: 2018-10-21

## 2018-10-21 RX ORDER — ATORVASTATIN CALCIUM 40 MG/1
40 TABLET, FILM COATED ORAL NIGHTLY
Qty: 30 TABLET | Refills: 11 | Status: SHIPPED | OUTPATIENT
Start: 2018-10-21 | End: 2019-09-24

## 2018-10-21 RX ORDER — ASPIRIN 81 MG/1
81 TABLET ORAL DAILY
Qty: 30 TABLET | Refills: 11
Start: 2018-10-22

## 2018-10-21 RX ADMIN — NAPROXEN 250 MG: 250 TABLET ORAL at 08:26

## 2018-10-21 RX ADMIN — POTASSIUM CHLORIDE 20 MEQ: 750 CAPSULE, EXTENDED RELEASE ORAL at 08:26

## 2018-10-21 RX ADMIN — TICAGRELOR 90 MG: 90 TABLET ORAL at 08:26

## 2018-10-21 RX ADMIN — AMLODIPINE BESYLATE 10 MG: 10 TABLET ORAL at 08:26

## 2018-10-21 RX ADMIN — METOPROLOL TARTRATE 12.5 MG: 25 TABLET, FILM COATED ORAL at 08:26

## 2018-10-21 RX ADMIN — ASPIRIN 81 MG: 81 TABLET ORAL at 08:26

## 2018-10-21 RX ADMIN — POTASSIUM CHLORIDE AND SODIUM CHLORIDE 50 ML/HR: 900; 150 INJECTION, SOLUTION INTRAVENOUS at 07:38

## 2018-10-21 RX ADMIN — GABAPENTIN 100 MG: 100 CAPSULE ORAL at 08:26

## 2018-10-21 RX ADMIN — HYDROCHLOROTHIAZIDE 25 MG: 25 TABLET ORAL at 08:26

## 2018-10-21 RX ADMIN — ALLOPURINOL 300 MG: 300 TABLET ORAL at 08:26

## 2018-10-21 NOTE — PLAN OF CARE
Problem: Fall Risk (Adult)  Goal: Identify Related Risk Factors and Signs and Symptoms  Outcome: Ongoing (interventions implemented as appropriate)   10/20/18 0332   Fall Risk (Adult)   Related Risk Factors (Fall Risk) environment unfamiliar;history of falls   Signs and Symptoms (Fall Risk) presence of risk factors     Goal: Absence of Fall  Outcome: Ongoing (interventions implemented as appropriate)   10/21/18 0421   Fall Risk (Adult)   Absence of Fall achieves outcome       Problem: Skin Injury Risk (Adult)  Goal: Identify Related Risk Factors and Signs and Symptoms  Outcome: Ongoing (interventions implemented as appropriate)   10/21/18 0421   Skin Injury Risk (Adult)   Related Risk Factors (Skin Injury Risk) mobility impaired     Goal: Skin Health and Integrity  Outcome: Ongoing (interventions implemented as appropriate)   10/20/18 0332   Skin Injury Risk (Adult)   Skin Health and Integrity making progress toward outcome       Problem: Cardiac: ACS (Acute Coronary Syndrome) (Adult)  Goal: Signs and Symptoms of Listed Potential Problems Will be Absent, Minimized or Managed (Cardiac: ACS)  Outcome: Ongoing (interventions implemented as appropriate)   10/20/18 0332   Goal/Outcome Evaluation   Problems Assessed (Acute Coronary Syndrome) all   Problems Present (Acute Coronary Syn) none       Problem: Patient Care Overview  Goal: Plan of Care Review  Outcome: Ongoing (interventions implemented as appropriate)   10/21/18 0421   Coping/Psychosocial   Plan of Care Reviewed With patient   Plan of Care Review   Progress improving   OTHER   Outcome Summary S/SA 70-87 on tele. No c/o pain. R groin AALIYAH, PPP. BP WNL on metoprolol. Possible discharge home today.

## 2018-10-21 NOTE — DISCHARGE SUMMARY
[facility], [facility] Discharge Summary    Date of Discharge:  10/21/2018    Discharge Diagnosis:   Acute inferior STEMI  CAD  HTN  Peripheral neuropathy  OA  HypoNa  HypoK, resolved    Hospital Course  Patient is a 78 y.o. male presented with SSCP and  SOB.   EKG revealed inferior ST elevation.  Pt was taken emergently to CCL and received a BONY to RCA.  Pt tolerated the procedure well and was tx to CCU for close monitoring.  He continued to do well and remains CP free and is stable for d/c    Procedures Performed  Procedure(s):  Left Heart Cath    Physical Exam at Discharge    Vital Signs  Temp:  [98.1 °F (36.7 °C)-98.8 °F (37.1 °C)] 98.8 °F (37.1 °C)  Heart Rate:  [77-89] 78  Resp:  [16-18] 16  BP: (105-126)/(71-88) 126/88    Physical Exam:  General Appearance:    Alert, cooperative, in no acute distress   Head:    Normocephalic, without obvious abnormality, atraumatic   Eyes:            Lids and lashes normal, conjunctivae and sclerae normal, no   icterus, PERRLA, EOMI   Throat:   Oral mucosa pink and moist   Neck:   No adenopathy, supple, trachea midline, no thyromegaly, no   carotid bruit, no JVD   Lungs:     Clear to auscultation bilaterally,respirations regular, even     and unlabored    Heart:    Regular rhythm and normal rate, normal S1 and S2, no            murmur, no gallop, no rub, no click   Chest Wall:    No abnormalities observed   Abdomen:     Normal bowel sounds present in all four quadrants, no       masses, no organomegaly, soft non-tender, non-distended    Extremities:   No edema, no cyanosis, no clubbing   Pulses:   Pulses palpable and equal bilaterally   Skin:   No bleeding, bruising or rash   Psychiatric:   Displays appropriate mood and affect       Discharge Disposition  Home or Self Care    Discharge Medications     Discharge Medications      New Medications      Instructions Start Date   aspirin 81 MG EC tablet   81 mg, Oral, Daily      atorvastatin 40 MG  tablet  Commonly known as:  LIPITOR   40 mg, Oral, Nightly      metoprolol tartrate 25 MG tablet  Commonly known as:  LOPRESSOR   12.5 mg, Oral, Every 12 Hours Scheduled      nitroglycerin 0.4 MG SL tablet  Commonly known as:  NITROSTAT   0.4 mg, Sublingual, Every 5 Minutes PRN, Take no more than 3 doses in 15 minutes.      ticagrelor 90 MG tablet tablet  Commonly known as:  BRILINTA   90 mg, Oral, 2 Times Daily         Continue These Medications      Instructions Start Date   allopurinol 300 MG tablet  Commonly known as:  ZYLOPRIM   300 mg, Oral, 2 Times Daily      ALPRAZolam 1 MG tablet  Commonly known as:  XANAX   1 mg, Oral, 3 Times Daily PRN      amitriptyline 25 MG tablet  Commonly known as:  ELAVIL   25 mg, Oral, Nightly      amLODIPine 10 MG tablet  Commonly known as:  NORVASC   10 mg, Oral, Daily      diphenhydrAMINE 25 mg capsule  Commonly known as:  BENADRYL   25 mg, Oral, Every 6 Hours PRN      gabapentin 100 MG capsule  Commonly known as:  NEURONTIN   100 mg, Oral, 2 Times Daily, Morning and Afternoon dose.      gabapentin 100 MG capsule  Commonly known as:  NEURONTIN   300 mg, Oral, Nightly      hydrochlorothiazide 25 MG tablet  Commonly known as:  HYDRODIURIL   25 mg, Oral, Daily      Lidocaine 5 % cream   1 application, Apply externally, 3 Times Daily PRN      potassium chloride 20 MEQ CR tablet  Commonly known as:  K-DUR,KLOR-CON   20 mEq, Oral, 2 Times Daily         Stop These Medications    diclofenac 75 MG EC tablet  Commonly known as:  VOLTAREN     HYDROcodone-acetaminophen  MG per tablet  Commonly known as:  NORCO            Discharge Diet:   Diet Instructions     Diet: Cardiac       Discharge Diet:  Cardiac          Activity at Discharge:     Follow-up Appointments  No future appointments.  Additional Instructions for the Follow-ups that You Need to Schedule     Ambulatory Referral to Cardiac Rehab    As directed      Discharge Follow-up with PCP    As directed      Currently Documented  PCP:  Nataly Faria APRN  PCP Phone Number:  [phone]    Follow Up Details:  1 week         Discharge Follow-up with Specified Provider: NP/PA @ [facility]; 1 Month    As directed      To:  NP/PA @ [facility]    Follow Up:  1 Month         Discharge Follow-up with Specified Provider: [facility]; 3 Months    As directed      To:  [place]    Follow Up:  3 Months               Hayley Avila PA-C  10/21/18  10:44 AM

## 2018-10-22 ENCOUNTER — READMISSION MANAGEMENT (OUTPATIENT)
Dept: CALL CENTER | Facility: HOSPITAL | Age: 78
End: 2018-10-22

## 2018-10-22 DIAGNOSIS — I21.11 ST ELEVATION MYOCARDIAL INFARCTION INVOLVING RIGHT CORONARY ARTERY (HCC): Primary | ICD-10-CM

## 2018-10-22 NOTE — OUTREACH NOTE
Prep Survey      Responses   Facility patient discharged from?  [facility]   Is patient eligible?  Yes   Discharge diagnosis  STEMI, heart cath and stent   Does the patient have one of the following disease processes/diagnoses(primary or secondary)?  Acute MI (STEMI,NSTEMI)   Does the patient have Home health ordered?  No   Is there a DME ordered?  No   Medication alerts for this patient  ASA started   Prep survey completed?  Yes          Lori Garner RN

## 2018-10-24 ENCOUNTER — READMISSION MANAGEMENT (OUTPATIENT)
Dept: CALL CENTER | Facility: HOSPITAL | Age: 78
End: 2018-10-24

## 2018-10-24 NOTE — OUTREACH NOTE
AMI Week 1 Survey      Responses   Facility patient discharged from?  [facility]   Does the patient have one of the following disease processes/diagnoses(primary or secondary)?  Acute MI (STEMI,NSTEMI)   Is there a successful TCM telephone encounter documented?  No   Week 1 attempt successful?  Yes   Call start time  1411   Rescheduled  Rescheduled-pt requested [Pt eating lunch]   Call end time  1412   Discharge diagnosis  STEMI, heart cath and stent          Christie Mullins RN

## 2018-10-26 ENCOUNTER — READMISSION MANAGEMENT (OUTPATIENT)
Dept: CALL CENTER | Facility: HOSPITAL | Age: 78
End: 2018-10-26

## 2018-10-26 NOTE — OUTREACH NOTE
AMI Week 1 Survey      Responses   Facility patient discharged from?  [place]   Does the patient have one of the following disease processes/diagnoses(primary or secondary)?  Acute MI (STEMI,NSTEMI)   Is there a successful TCM telephone encounter documented?  No   Week 1 attempt successful?  No   Unsuccessful attempts  Attempt 1   Call end time  1452   Meds reviewed with patient/caregiver?  Yes   Is the patient having any side effects they believe may be caused by any medication additions or changes?  No   Does the patient have all prescriptions related to this admission filled (includes statins,anticoagulants,HTN meds,anti-arrhythmia meds)  Yes   Is the patient taking all medications as directed (includes completed medication regime)?  Yes   Does the patient have a primary care provider?   Yes   Does the patient have an appointment with their PCP,cardiologist,or clinic within 7 days of discharge?  Greater than 7 days   What is preventing the patient from scheduling follow up appointments within 7 days of discharge?  Earlier appointment not available   Nursing Interventions  Verified appointment date/time/provider   Has the patient kept scheduled appointments due by today?  N/A   Comments  Patient says he has appt with PCP but can't remember the date   Psychosocial issues?  No   Did the patient receive a copy of their discharge instructions?  Yes   Nursing interventions  Reviewed instructions with patient   What is the patient's perception of their health status since discharge?  Improving   Nursing interventions  Nurse provided patient education   Is the patient/caregiver able to teach back signs and symptoms of when to call for help immediately:  Sudden chest discomfort, Sudden discomfort in arms, back, neck or jaw, Shortness of breath at any time, Sudden sweating or clammy skin, Nausea or vomiting, Dizziness or lightheadedness, Irregular or rapid heart rate   Nursing interventions  Nurse provided patient  education   Is the pateint /caregiver able to teach back the importance of cardiac rehab?  -- [Has not heard from cardiac rehab]   Nursing interventions  Provided education on importance of cardiac rehab   Is the patient/caregiver able to teach back lifestyle changes to help prevent MIs  Quit smoking, Heart healthy diet, Regular exercise as approved by provider, Maintaining a healthy weight, Limiting alcohol intake, Reducing stress   Is the patient/caregiver able to teach back ways to prevent a second heart attack:  Take medications, Follow up with MD, Manage risk factors, Get support (AHA website:supportnetwork.heart.org), Participate in Cardiac Rehab   If the patient is a current smoker, are they able to teach back resources for cessation?  Smoking cessation medications, Smoking cessation classes, Smoking cessation support groups, 5-372-DxznCji   Is the patient/caregiver able to teach back the hierarchy of who to call/visit for symptoms/problems? PCP, Specialist, Home health nurse, Urgent Care, ED, 911  Yes   Additional teach back comments  Stellain doing fine   Week 1 call completed?  Yes          Janna Coppola RN

## 2018-11-02 ENCOUNTER — READMISSION MANAGEMENT (OUTPATIENT)
Dept: CALL CENTER | Facility: HOSPITAL | Age: 78
End: 2018-11-02

## 2018-11-02 NOTE — OUTREACH NOTE
AMI Week 2 Survey      Responses   Facility patient discharged from?  [facility]   Does the patient have one of the following disease processes/diagnoses(primary or secondary)?  Acute MI (STEMI,NSTEMI)   Week 2 attempt successful?  Yes   Call start time  1450   Call end time  1501   Discharge diagnosis  STEMI, heart cath and stent   Meds reviewed with patient/caregiver?  Yes   Is the patient having any side effects they believe may be caused by any medication additions or changes?  No   Does the patient have all prescriptions related to this admission filled (includes statins,anticoagulants,HTN meds,anti-arrhythmia meds)  Yes   Is the patient taking all medications as directed (includes completed medication regime)?  Yes   Does the patient have a primary care provider?   Yes   Does the patient have an appointment with their PCP,cardiologist,or clinic within 7 days of discharge?  Yes   Has the patient kept scheduled appointments due by today?  Yes   Comments  Reviewed next appts.   Has home health visited the patient within 72 hours of discharge?  N/A   Psychosocial issues?  No   Did the patient receive a copy of their discharge instructions?  Yes   Nursing interventions  Reviewed instructions with patient   What is the patient's perception of their health status since discharge?  Improving   Nursing interventions  Nurse provided patient education   Is the patient/caregiver able to teach back signs and symptoms of when to call for help immediately:  Sudden chest discomfort, Sudden discomfort in arms, back, neck or jaw, Shortness of breath at any time, Sudden sweating or clammy skin, Nausea or vomiting, Dizziness or lightheadedness, Irregular or rapid heart rate   Nursing interventions  Nurse provided patient education   Is the pateint /caregiver able to teach back the importance of cardiac rehab?  Yes   Nursing interventions  Provided education on importance of cardiac rehab   Is the patient/caregiver able to teach back  lifestyle changes to help prevent MIs  Quit smoking, Heart healthy diet, Regular exercise as approved by provider, Maintaining a healthy weight, Limiting alcohol intake, Reducing stress   Is the patient/caregiver able to teach back ways to prevent a second heart attack:  Take medications, Follow up with MD, Manage risk factors, Get support (AHA website:supportnetwork.heart.org), Participate in Cardiac Rehab   If the patient is a current smoker, are they able to teach back resources for cessation?  Smoking cessation medications, Smoking cessation classes, Smoking cessation support groups, 8-540-JtmaDjv   Is the patient/caregiver able to teach back the hierarchy of who to call/visit for symptoms/problems? PCP, Specialist, Home health nurse, Urgent Care, ED, 911  Yes   Week 2 call completed?  Yes          Aj Sr RN

## 2018-11-12 ENCOUNTER — READMISSION MANAGEMENT (OUTPATIENT)
Dept: CALL CENTER | Facility: HOSPITAL | Age: 78
End: 2018-11-12

## 2018-11-12 NOTE — OUTREACH NOTE
AMI Week 3 Survey      Responses   Facility patient discharged from?  [place]   Does the patient have one of the following disease processes/diagnoses(primary or secondary)?  Acute MI (STEMI,NSTEMI)   Week 3 attempt successful?  Yes   Call start time  1634   Call end time  1643   Discharge diagnosis  STEMI, heart cath and stent   Meds reviewed with patient/caregiver?  Yes   Is the patient having any side effects they believe may be caused by any medication additions or changes?  No   Does the patient have all prescriptions related to this admission filled (includes statins,anticoagulants,HTN meds,anti-arrhythmia meds)  Yes   Is the patient taking all medications as directed (includes completed medication regime)?  Yes   Does the patient have a primary care provider?   Yes   Does the patient have an appointment with their PCP,cardiologist,or clinic within 7 days of discharge?  Yes   Has the patient kept scheduled appointments due by today?  Yes   Psychosocial issues?  No   Comments  pt active, working in shop, had the flu this week with N, V and D but has resolved   Did the patient receive a copy of their discharge instructions?  Yes   Nursing interventions  Reviewed instructions with patient   What is the patient's perception of their health status since discharge?  Improving   Nursing interventions  Nurse provided patient education   Is the patient/caregiver able to teach back signs and symptoms of when to call for help immediately:  Sudden chest discomfort, Sudden discomfort in arms, back, neck or jaw, Shortness of breath at any time, Sudden sweating or clammy skin, Nausea or vomiting, Dizziness or lightheadedness, Irregular or rapid heart rate   Nursing interventions  Nurse provided patient education   Is the pateint /caregiver able to teach back the importance of cardiac rehab?  Yes   Nursing interventions  Provided education on importance of cardiac rehab   Is the patient/caregiver able to teach back lifestyle  changes to help prevent MIs  Quit smoking, Regular exercise as approved by provider, Heart healthy diet, Maintaining a healthy weight   Is the patient/caregiver able to teach back ways to prevent a second heart attack:  Take medications, Follow up with MD, Participate in Cardiac Rehab, Manage risk factors   Is the patient/caregiver able to teach back the hierarchy of who to call/visit for symptoms/problems? PCP, Specialist, Home health nurse, Urgent Care, ED, 911  Yes   Additional teach back comments  has reduced smoking,    Week 3 call completed?  Yes          Jocelin Sanon RN

## 2018-11-19 ENCOUNTER — READMISSION MANAGEMENT (OUTPATIENT)
Dept: CALL CENTER | Facility: HOSPITAL | Age: 78
End: 2018-11-19

## 2018-11-19 NOTE — OUTREACH NOTE
AMI Week 4 Survey      Responses   Facility patient discharged from?  [place]   Does the patient have one of the following disease processes/diagnoses(primary or secondary)?  Acute MI (STEMI,NSTEMI)   Week 4 attempt successful?  No          Fela Fallon RN

## 2019-02-04 ENCOUNTER — OFFICE VISIT (OUTPATIENT)
Dept: CARDIOLOGY | Facility: CLINIC | Age: 79
End: 2019-02-04

## 2019-02-04 VITALS
BODY MASS INDEX: 21.76 KG/M2 | DIASTOLIC BLOOD PRESSURE: 68 MMHG | WEIGHT: 152 LBS | HEART RATE: 88 BPM | OXYGEN SATURATION: 98 % | HEIGHT: 70 IN | SYSTOLIC BLOOD PRESSURE: 116 MMHG

## 2019-02-04 DIAGNOSIS — I10 ESSENTIAL HYPERTENSION: ICD-10-CM

## 2019-02-04 DIAGNOSIS — E78.2 MIXED HYPERLIPIDEMIA: ICD-10-CM

## 2019-02-04 DIAGNOSIS — I25.118 CORONARY ARTERY DISEASE OF NATIVE ARTERY OF NATIVE HEART WITH STABLE ANGINA PECTORIS (HCC): Primary | ICD-10-CM

## 2019-02-04 PROCEDURE — 93000 ELECTROCARDIOGRAM COMPLETE: CPT | Performed by: INTERNAL MEDICINE

## 2019-02-04 PROCEDURE — 99214 OFFICE O/P EST MOD 30 MIN: CPT | Performed by: INTERNAL MEDICINE

## 2019-02-04 NOTE — PROGRESS NOTES
Referring Provider: Nataly Faria APRN    Reason for Follow-up Visit: CAD    Subjective .   Chief Complaint:   Chief Complaint   Patient presents with   • Follow-up     [address] [place] [address] s/p cath 10/19/18   • Coronary Artery Disease     pt states he has been feeling great.  he only had to use the nitro once which was a few days after the procedure.   • Hypertension     pt states its been better than its ever been.   • Hyperlipidemia     labs done 10/20/19 LDL was 30 pt take Lipitor 40 mg.       History of present illness:  Hernan Su is a 78 y.o. yo male with history of s/p STEMI treated with BONY back in October. Denies CP or SOB.        History  Past Medical History:   Diagnosis Date   • Abnormal EKG    • Arthritis    • Coronary artery disease    • Gout    • HTN (hypertension)    • Hyperlipidemia    • Myocardial infarction (CMS/HCC)    • Neuropathy    ,   Past Surgical History:   Procedure Laterality Date   • ARM TENDON REPAIR     • CARDIAC CATHETERIZATION N/A 10/19/2018    Procedure: Left Heart Cath;  Surgeon: Antonino Joshua MD;  Location:  [facility];  Service: Cardiovascular   • CORONARY STENT PLACEMENT     • EYE SURGERY Left    ,   Family History   Problem Relation Age of Onset   • Heart disease Mother    • Parkinsonism Mother    • Stroke Father    • No Known Problems Sister    • Alcohol abuse Brother    ,   Social History     Tobacco Use   • Smoking status: Current Every Day Smoker     Packs/day: 0.50     Types: Cigarettes     Start date: 1946   • Smokeless tobacco: Former User     Types: Chew     Quit date: 1960   Substance Use Topics   • Alcohol use: Yes     Alcohol/week: 3.0 oz     Types: 5 Cans of beer per week     Comment: daily   • Drug use: No   ,     Medications  Current Outpatient Medications   Medication Sig Dispense Refill   • allopurinol (ZYLOPRIM) 300 MG tablet Take 300 mg by mouth 2 (Two) Times a Day.     • ALPRAZolam (XANAX) 1 MG tablet Take 1 mg by mouth 3 (Three)  Times a Day As Needed for Anxiety.     • amitriptyline (ELAVIL) 25 MG tablet Take 25 mg by mouth Every Night.     • amLODIPine (NORVASC) 10 MG tablet Take 10 mg by mouth Daily.     • aspirin 81 MG EC tablet Take 1 tablet by mouth Daily. 30 tablet 11   • atorvastatin (LIPITOR) 40 MG tablet Take 1 tablet by mouth Every Night. 30 tablet 11   • diphenhydrAMINE (BENADRYL) 25 mg capsule Take 25 mg by mouth Every 6 (Six) Hours As Needed for Itching or Allergies.     • gabapentin (NEURONTIN) 100 MG capsule Take 100 mg by mouth 2 (Two) Times a Day. Morning and Afternoon dose.     • gabapentin (NEURONTIN) 300 MG capsule Take 300 mg by mouth Every Night.     • hydrochlorothiazide (HYDRODIURIL) 25 MG tablet Take 25 mg by mouth Daily.     • Lidocaine 5 % cream Apply 1 application topically 3 (Three) Times a Day As Needed (pain).     • metoprolol tartrate (LOPRESSOR) 25 MG tablet Take 0.5 tablets by mouth Every 12 (Twelve) Hours. 60 tablet 11   • nitroglycerin (NITROSTAT) 0.4 MG SL tablet Place 1 tablet under the tongue Every 5 (Five) Minutes As Needed for Chest Pain for up to 2 doses. Take no more than 3 doses in 15 minutes. 30 tablet 12   • ticagrelor (BRILINTA) 90 MG tablet tablet Take 1 tablet by mouth 2 (Two) Times a Day. 60 tablet 11   • potassium chloride (K-DUR,KLOR-CON) 20 MEQ CR tablet Take 20 mEq by mouth 2 (Two) Times a Day.       No current facility-administered medications for this visit.        Allergies:  Patient has no known allergies.    Review of Systems  Review of Systems   HENT: Negative for nosebleeds.    Cardiovascular: Positive for dyspnea on exertion. Negative for chest pain, claudication, irregular heartbeat, leg swelling, near-syncope, orthopnea, palpitations, paroxysmal nocturnal dyspnea and syncope.   Respiratory: Negative for cough, hemoptysis and shortness of breath.    Gastrointestinal: Negative for dysphagia, hematemesis and melena.   Genitourinary: Negative for hematuria.   All other systems  "reviewed and are negative.      Objective     Physical Exam:  /68 (BP Location: Left arm, Patient Position: Sitting, Cuff Size: Adult)   Pulse 88   Ht 177.8 cm (70\")   Wt 68.9 kg (152 lb)   SpO2 98%   BMI 21.81 kg/m²   Physical Exam   Constitutional: He is oriented to person, place, and time. He appears well-nourished. No distress.   HENT:   Head: Normocephalic.   Eyes: No scleral icterus.   Neck: Normal range of motion. Neck supple.   Cardiovascular: Normal rate, regular rhythm and normal heart sounds. Exam reveals no gallop and no friction rub.   No murmur heard.  Pulmonary/Chest: Effort normal and breath sounds normal. No respiratory distress. He has no wheezes. He has no rales.   Abdominal: Soft. Bowel sounds are normal. He exhibits no distension. There is no tenderness.   Musculoskeletal: He exhibits edema (trace).   Neurological: He is alert and oriented to person, place, and time.   Skin: Skin is warm and dry. He is not diaphoretic. No erythema.   Psychiatric: He has a normal mood and affect. His behavior is normal.       Results Review:    ECG 12 Lead  Date/Time: 2/4/2019 10:20 AM  Performed by: Antonino Joshua MD  Authorized by: Antonino Joshua MD   Comparison: compared with previous ECG   Similar to previous ECG  Rhythm: sinus rhythm  Ectopy: infrequent PVCs and atrial premature contractions  Rate: normal  Conduction: incomplete LBBB  ST Segments: ST segments normal  T Waves: T waves normal  QRS axis: normal  Clinical impression: non-specific ECG            Admission on 10/19/2018, Discharged on 10/21/2018   Component Date Value Ref Range Status   • Glucose 10/19/2018 139* 70 - 100 mg/dL Final   • BUN 10/19/2018 11  5 - 21 mg/dL Final   • Creatinine 10/19/2018 0.88  0.50 - 1.40 mg/dL Final   • Sodium 10/19/2018 132* 135 - 145 mmol/L Final   • Potassium 10/19/2018 3.6  3.5 - 5.3 mmol/L Final   • Chloride 10/19/2018 91* 98 - 110 mmol/L Final   • CO2 10/19/2018 29.0  24.0 - 31.0 mmol/L Final   • " Calcium 10/19/2018 9.8  8.4 - 10.4 mg/dL Final   • Total Protein 10/19/2018 7.3  6.3 - 8.7 g/dL Final   • Albumin 10/19/2018 4.00  3.50 - 5.00 g/dL Final   • ALT (SGPT) 10/19/2018 33  0 - 54 U/L Final   • AST (SGOT) 10/19/2018 72* 7 - 45 U/L Final   • Alkaline Phosphatase 10/19/2018 115  24 - 120 U/L Final   • Total Bilirubin 10/19/2018 1.5* 0.1 - 1.0 mg/dL Final   • eGFR Non African Amer 10/19/2018 84  >60 mL/min/1.73 Final   • Globulin 10/19/2018 3.3  gm/dL Final   • A/G Ratio 10/19/2018 1.2  1.1 - 2.5 g/dL Final   • BUN/Creatinine Ratio 10/19/2018 12.5  7.0 - 25.0 Final   • Anion Gap 10/19/2018 12.0  4.0 - 13.0 mmol/L Final   • Troponin I 10/19/2018 2.110* 0.000 - 0.034 ng/mL Final   • Troponin I 10/19/2018 295.000* 0.000 - 0.034 ng/mL Final   • Extra Tube 10/19/2018 hold for add-on   Final    Auto resulted   • Extra Tube 10/19/2018 Hold for add-ons.   Final    Auto resulted.   • Extra Tube 10/19/2018 hold for add-on   Final    Auto resulted   • WBC 10/19/2018 12.83* 4.80 - 10.80 10*3/mm3 Final   • RBC 10/19/2018 5.26  4.80 - 5.90 10*6/mm3 Final   • Hemoglobin 10/19/2018 19.3* 14.0 - 18.0 g/dL Final   • Hematocrit 10/19/2018 53.4* 40.0 - 52.0 % Final   • MCV 10/19/2018 101.5* 82.0 - 95.0 fL Final   • MCH 10/19/2018 36.7* 28.0 - 32.0 pg Final   • MCHC 10/19/2018 36.1* 33.0 - 36.0 g/dL Final   • RDW 10/19/2018 14.2  12.0 - 15.0 % Final   • RDW-SD 10/19/2018 53.2  40.0 - 54.0 fl Final   • MPV 10/19/2018 9.7  6.0 - 12.0 fL Final   • Platelets 10/19/2018 283  130 - 400 10*3/mm3 Final   • Neutrophil % 10/19/2018 85.8* 39.0 - 78.0 % Final   • Lymphocyte % 10/19/2018 5.4* 15.0 - 45.0 % Final   • Monocyte % 10/19/2018 7.3  4.0 - 12.0 % Final   • Eosinophil % 10/19/2018 0.1  0.0 - 4.0 % Final   • Basophil % 10/19/2018 0.5  0.0 - 2.0 % Final   • Immature Grans % 10/19/2018 0.9  0.0 - 5.0 % Final   • Neutrophils, Absolute 10/19/2018 11.01* 1.87 - 8.40 10*3/mm3 Final   • Lymphocytes, Absolute 10/19/2018 0.69* 0.72 - 4.86  10*3/mm3 Final   • Monocytes, Absolute 10/19/2018 0.94  0.19 - 1.30 10*3/mm3 Final   • Eosinophils, Absolute 10/19/2018 0.01  0.00 - 0.70 10*3/mm3 Final   • Basophils, Absolute 10/19/2018 0.06  0.00 - 0.20 10*3/mm3 Final   • Immature Grans, Absolute 10/19/2018 0.12* 0.00 - 0.03 10*3/mm3 Final   • nRBC 10/19/2018 0.0  0.0 - 0.0 /100 WBC Final   • Troponin I 10/19/2018 340.000* 0.000 - 0.034 ng/mL Final   • BSA 10/20/2018 1.8  m^2 Final   • IVSd 10/20/2018 0.9  cm Final   • LVIDd 10/20/2018 4.2  cm Final   • LVIDs 10/20/2018 3.3  cm Final   • LVPWd 10/20/2018 1.0  cm Final   • IVS/LVPW 10/20/2018 0.9   Final   • FS 10/20/2018 21.4  % Final   • EDV(Teich) 10/20/2018 78.6  ml Final   • ESV(Teich) 10/20/2018 44.1  ml Final   • EF(Teich) 10/20/2018 43.8  % Final   • EDV(cubed) 10/20/2018 74.1  ml Final   • ESV(cubed) 10/20/2018 35.9  ml Final   • EF(cubed) 10/20/2018 51.5  % Final   • LV mass(C)d 10/20/2018 127.8  grams Final   • LV mass(C)dI 10/20/2018 69.4  grams/m^2 Final   • SV(Teich) 10/20/2018 34.4  ml Final   • SI(Teich) 10/20/2018 18.7  ml/m^2 Final   • SV(cubed) 10/20/2018 38.2  ml Final   • SI(cubed) 10/20/2018 20.7  ml/m^2 Final   • Ao root diam 10/20/2018 3.5  cm Final   • Ao root area 10/20/2018 9.6  cm^2 Final   • LA dimension 10/20/2018 2.7  cm Final   • LA/Ao 10/20/2018 0.77   Final   • LVOT diam 10/20/2018 2.0  cm Final   • LVOT area 10/20/2018 3.1  cm^2 Final   • LVOT area(traced) 10/20/2018 3.1  cm^2 Final   • LVLd ap4 10/20/2018 7.7  cm Final   • EDV(MOD-sp4) 10/20/2018 58.4  ml Final   • LVLs ap4 10/20/2018 6.9  cm Final   • ESV(MOD-sp4) 10/20/2018 31.9  ml Final   • EF(MOD-sp4) 10/20/2018 45.4  % Final   • SV(MOD-sp4) 10/20/2018 26.5  ml Final   • SI(MOD-sp4) 10/20/2018 14.4  ml/m^2 Final   • Ao root area (BSA corrected) 10/20/2018 1.9   Final   • LV Johnson Vol (BSA corrected) 10/20/2018 31.7  ml/m^2 Final   • LV Sys Vol (BSA corrected) 10/20/2018 17.3  ml/m^2 Final   • MV E max richa 10/20/2018 41.7   cm/sec Final   • MV A max wei 10/20/2018 67.4  cm/sec Final   • MV E/A 10/20/2018 0.62   Final   • MV dec time 10/20/2018 0.42  sec Final   • Ao pk wei 10/20/2018 102.0  cm/sec Final   • Ao max PG 10/20/2018 4.2  mmHg Final   • Ao max PG (full) 10/20/2018 1.9  mmHg Final   • Ao V2 mean 10/20/2018 69.3  cm/sec Final   • Ao mean PG 10/20/2018 2.0  mmHg Final   • Ao mean PG (full) 10/20/2018 1.0  mmHg Final   • Ao V2 VTI 10/20/2018 18.5  cm Final   • XAVIER(I,A) 10/20/2018 2.5  cm^2 Final   • XAVIER(I,D) 10/20/2018 2.5  cm^2 Final   • XAVIER(V,A) 10/20/2018 2.3  cm^2 Final   • XAVIER(V,D) 10/20/2018 2.3  cm^2 Final   • LV V1 max PG 10/20/2018 2.3  mmHg Final   • LV V1 mean PG 10/20/2018 1.0  mmHg Final   • LV V1 max 10/20/2018 75.4  cm/sec Final   • LV V1 mean 10/20/2018 49.7  cm/sec Final   • LV V1 VTI 10/20/2018 14.9  cm Final   • SV(Ao) 10/20/2018 178.0  ml Final   • SI(Ao) 10/20/2018 96.6  ml/m^2 Final   • SV(LVOT) 10/20/2018 46.8  ml Final   • SI(LVOT) 10/20/2018 25.4  ml/m^2 Final   • BH CV ECHO VICKI - BZI_BMI 10/20/2018 21.4  kilograms/m^2 Final   • BH CV ECHO VICKI - BSA(HAYCOCK) 10/20/2018 1.8  m^2 Final   • BH CV ECHO VICKI - BZI_METRIC_WEIGHT 10/20/2018 67.6  kg Final   • BH CV ECHO VICKI - BZI_METRIC_HEIGHT 10/20/2018 177.8  cm Final   • LA volume 10/20/2018 43.1  cm3 Final   • LA Volume Index 10/20/2018 23.4  mL/m2 Final   • Avg E/e' ratio 10/20/2018 7.18   Final   • Lat Peak E' Wei 10/20/2018 8.9  cm/sec Final   • Med Peak E' Wei 10/20/2018 2.72  cm/sec Final   • Echo EF Estimated 10/20/2018 45  % Final   • Troponin I 10/20/2018 230.000* 0.000 - 0.034 ng/mL Final   • WBC 10/20/2018 9.80  4.80 - 10.80 10*3/mm3 Final   • RBC 10/20/2018 4.24* 4.80 - 5.90 10*6/mm3 Final   • Hemoglobin 10/20/2018 15.7  14.0 - 18.0 g/dL Final   • Hematocrit 10/20/2018 42.8  40.0 - 52.0 % Final   • MCV 10/20/2018 100.9* 82.0 - 95.0 fL Final   • MCH 10/20/2018 37.0* 28.0 - 32.0 pg Final   • MCHC 10/20/2018 36.7* 33.0 - 36.0 g/dL Final   • RDW  10/20/2018 13.9  12.0 - 15.0 % Final   • RDW-SD 10/20/2018 51.1  40.0 - 54.0 fl Final   • MPV 10/20/2018 10.0  6.0 - 12.0 fL Final   • Platelets 10/20/2018 204  130 - 400 10*3/mm3 Final   • Glucose 10/20/2018 110* 70 - 100 mg/dL Final   • BUN 10/20/2018 17  5 - 21 mg/dL Final   • Creatinine 10/20/2018 0.88  0.50 - 1.40 mg/dL Final   • Sodium 10/20/2018 131* 135 - 145 mmol/L Final   • Potassium 10/20/2018 3.1* 3.5 - 5.3 mmol/L Final   • Chloride 10/20/2018 95* 98 - 110 mmol/L Final   • CO2 10/20/2018 30.0  24.0 - 31.0 mmol/L Final   • Calcium 10/20/2018 9.0  8.4 - 10.4 mg/dL Final   • eGFR Non  Amer 10/20/2018 84  >60 mL/min/1.73 Final   • BUN/Creatinine Ratio 10/20/2018 19.3  7.0 - 25.0 Final   • Anion Gap 10/20/2018 6.0  4.0 - 13.0 mmol/L Final   • Hemoglobin A1C 10/20/2018 4.3  % Final   • Total Cholesterol 10/20/2018 114* 130 - 200 mg/dL Final   • Triglycerides 10/20/2018 100  0 - 149 mg/dL Final   • HDL Cholesterol 10/20/2018 61  >=40 mg/dL Final   • LDL Cholesterol  10/20/2018 38  0 - 99 mg/dL Final   • LDL/HDL Ratio 10/20/2018 0.54   Final   • Glucose 10/21/2018 92  70 - 100 mg/dL Final   • BUN 10/21/2018 15  5 - 21 mg/dL Final   • Creatinine 10/21/2018 0.65  0.50 - 1.40 mg/dL Final   • Sodium 10/21/2018 133* 135 - 145 mmol/L Final   • Potassium 10/21/2018 3.7  3.5 - 5.3 mmol/L Final   • Chloride 10/21/2018 97* 98 - 110 mmol/L Final   • CO2 10/21/2018 30.0  24.0 - 31.0 mmol/L Final   • Calcium 10/21/2018 9.1  8.4 - 10.4 mg/dL Final   • eGFR Non African Amer 10/21/2018 119  >60 mL/min/1.73 Final   • BUN/Creatinine Ratio 10/21/2018 23.1  7.0 - 25.0 Final   • Anion Gap 10/21/2018 6.0  4.0 - 13.0 mmol/L Final       Assessment/Plan   Hernan was seen today for follow-up, coronary artery disease, hypertension and hyperlipidemia.    Diagnoses and all orders for this visit:    Essential hypertension, good control    Coronary artery disease of native artery of native heart with stable angina pectoris (CMS/HCC),  The patient denies chest pain, shortness of breath, dyspnea on exertion, orthopnea, PND, edema. There is no evidence of ongoing ischemia    Mixed hyperlipidemia, Patient's statin therapy is followed by PCP.        Patient's Body mass index is 21.81 kg/m². BMI is within normal parameters. No follow-up required..

## 2019-09-24 ENCOUNTER — OFFICE VISIT (OUTPATIENT)
Dept: GASTROENTEROLOGY | Facility: CLINIC | Age: 79
End: 2019-09-24

## 2019-09-24 VITALS
HEART RATE: 104 BPM | HEIGHT: 70 IN | SYSTOLIC BLOOD PRESSURE: 152 MMHG | OXYGEN SATURATION: 97 % | WEIGHT: 143 LBS | BODY MASS INDEX: 20.47 KG/M2 | DIASTOLIC BLOOD PRESSURE: 86 MMHG | TEMPERATURE: 98.3 F

## 2019-09-24 DIAGNOSIS — Z86.010 HX OF COLONIC POLYPS: Primary | ICD-10-CM

## 2019-09-24 PROCEDURE — S0260 H&P FOR SURGERY: HCPCS | Performed by: NURSE PRACTITIONER

## 2019-09-24 RX ORDER — ALBUTEROL SULFATE 90 UG/1
2 AEROSOL, METERED RESPIRATORY (INHALATION) AS NEEDED
Refills: 3 | Status: ON HOLD | COMMUNITY
Start: 2019-09-01 | End: 2020-01-01

## 2019-09-24 RX ORDER — PHENAZOPYRIDINE HYDROCHLORIDE 200 MG/1
1 TABLET, FILM COATED ORAL 3 TIMES DAILY
Refills: 0 | Status: ON HOLD | COMMUNITY
Start: 2019-08-05 | End: 2020-01-01

## 2019-09-24 RX ORDER — DICLOFENAC SODIUM 75 MG/1
1 TABLET, DELAYED RELEASE ORAL 2 TIMES DAILY
Refills: 2 | COMMUNITY
Start: 2019-09-01 | End: 2020-01-01 | Stop reason: HOSPADM

## 2019-09-24 RX ORDER — HYDROCODONE BITARTRATE AND ACETAMINOPHEN 10; 325 MG/1; MG/1
1 TABLET ORAL EVERY 6 HOURS PRN
COMMUNITY
Start: 2017-10-06 | End: 2021-01-01 | Stop reason: HOSPADM

## 2019-09-24 RX ORDER — PROBENECID AND COLCHICINE 500; .5 MG/1; MG/1
1 TABLET ORAL 2 TIMES DAILY
Status: ON HOLD | COMMUNITY
Start: 2017-12-26 | End: 2020-01-01

## 2019-09-24 NOTE — PROGRESS NOTES
Chief Complaint   Patient presents with   • Colon Cancer Screening     Pt presents today for evaluation for colonoscopy-last colon was in 2009 by Dr. Baumann-pt thinks he had polyps     Subjective   HPI  Hernan Su is a 79 y.o. male who presents as a referral for preventative maintenance. He has no complaints of nausea or vomiting. No change in bowels. No wt loss. No BRBPR. No melena. There is no family hx for colon cancer. No abdominal pain. There was no Cologuard screening this year. The patient had a colonoscopy years ago with  and the patient states he had polyps. We are trying to get records. On Xarelto.  The patient does have a history of CAD and had heart cath with stent placement 10/2018 per Dr. Joshua.  The patient tells me he is unsure why he is on Xarelto.      Past Medical History:   Diagnosis Date   • Abnormal EKG    • Arthritis    • B12 deficiency    • Coronary artery disease    • Gout    • HTN (hypertension)    • Hyperlipidemia    • Mixed hyperlipidemia    • Myocardial infarction (CMS/HCC)    • Neuropathy    • RLS (restless legs syndrome)      Past Surgical History:   Procedure Laterality Date   • ARM TENDON REPAIR     • CARDIAC CATHETERIZATION N/A 10/19/2018    Procedure: Left Heart Cath;  Surgeon: Antonino Joshua MD;  Location:  [facility];  Service: Cardiovascular   • CORONARY STENT PLACEMENT     • EYE SURGERY Left        Current Outpatient Medications:   •  albuterol sulfate  (90 Base) MCG/ACT inhaler, Inhale 2 puffs As Needed., Disp: , Rfl: 3  •  allopurinol (ZYLOPRIM) 300 MG tablet, Take 300 mg by mouth 2 (Two) Times a Day., Disp: , Rfl:   •  ALPRAZolam (XANAX) 1 MG tablet, Take 1 mg by mouth 3 (Three) Times a Day As Needed for Anxiety., Disp: , Rfl:   •  amitriptyline (ELAVIL) 25 MG tablet, Take 25 mg by mouth Every Night., Disp: , Rfl:   •  amLODIPine (NORVASC) 10 MG tablet, Take 10 mg by mouth Daily., Disp: , Rfl:   •  aspirin 81 MG EC tablet, Take 1  tablet by mouth Daily., Disp: 30 tablet, Rfl: 11  •  colchicine-probenecid (COL-BENEMID) 0.5-500 MG tablet, Take 1 tablet by mouth 2 (Two) Times a Day., Disp: , Rfl:   •  diclofenac (VOLTAREN) 75 MG EC tablet, Take 1 tablet by mouth 2 (Two) Times a Day., Disp: , Rfl: 2  •  diphenhydrAMINE (BENADRYL) 25 mg capsule, Take 25 mg by mouth Every 6 (Six) Hours As Needed for Itching or Allergies., Disp: , Rfl:   •  gabapentin (NEURONTIN) 300 MG capsule, Take 300 mg by mouth 4 (Four) Times a Day., Disp: , Rfl:   •  hydrochlorothiazide (HYDRODIURIL) 25 MG tablet, Take 25 mg by mouth Daily., Disp: , Rfl:   •  HYDROcodone-acetaminophen (NORCO)  MG per tablet, Take 1 tablet by mouth As Needed., Disp: , Rfl:   •  Lidocaine 5 % cream, Apply 1 application topically 3 (Three) Times a Day As Needed (pain)., Disp: , Rfl:   •  nitroglycerin (NITROSTAT) 0.4 MG SL tablet, Place 1 tablet under the tongue Every 5 (Five) Minutes As Needed for Chest Pain for up to 2 doses. Take no more than 3 doses in 15 minutes., Disp: 30 tablet, Rfl: 12  •  phenazopyridine (PYRIDIUM) 200 MG tablet, Take 1 tablet by mouth 3 (Three) Times a Day., Disp: , Rfl: 0  •  potassium chloride (K-DUR,KLOR-CON) 20 MEQ CR tablet, Take 20 mEq by mouth 2 (Two) Times a Day., Disp: , Rfl:   •  rivaroxaban (XARELTO) 20 MG tablet, Take 20 mg by mouth Daily., Disp: , Rfl:   No Known Allergies  Social History     Socioeconomic History   • Marital status: Other     Spouse name: Not on file   • Number of children: Not on file   • Years of education: Not on file   • Highest education level: Not on file   Tobacco Use   • Smoking status: Current Every Day Smoker     Packs/day: 0.50     Types: Cigarettes     Start date: 1946   • Smokeless tobacco: Former User     Types: Chew     Quit date: 1960   Substance and Sexual Activity   • Alcohol use: Yes     Alcohol/week: 3.0 oz     Types: 5 Cans of beer per week     Comment: Daily   • Drug use: No     Family History   Problem Relation  "Age of Onset   • Heart disease Mother    • Parkinsonism Mother    • Stroke Father    • No Known Problems Sister    • Alcohol abuse Brother    • Colon cancer Neg Hx    • Colon polyps Neg Hx    • Esophageal cancer Neg Hx    • Liver cancer Neg Hx    • Liver disease Neg Hx    • Rectal cancer Neg Hx    • Stomach cancer Neg Hx        REVIEW OF SYSTEMS  General: well appearing, no fever chills or sweats, no unexplained wt loss  HEENT: no acute visual or hearing disturbances  Cardiovascular: No chest pain or palpitations  Pulmonary: No shortness of breath, coughing, wheezing or hemoptysis  : No burning, urgency, hematuria, or dysuria  Musculoskeletal: No joint pain or stiffness  Peripheral: no edema  Skin: No lesions or rashes  Neuro: No dizziness, headaches, stroke, syncope  Endocrine: No hot or cold intolerances  Hematological: No blood dyscrasias    Objective   Vitals:    09/24/19 0819   BP: 152/86   BP Location: Left arm   Patient Position: Sitting   Cuff Size: Adult   Pulse: 104   Temp: 98.3 °F (36.8 °C)   TempSrc: Tympanic   SpO2: 97%   Weight: 64.9 kg (143 lb)   Height: 177.8 cm (70\")     Body mass index is 20.52 kg/m².    PHYSICAL EXAM  General: age appropriate well nourished well appearing, no acute distress  Head: normocephalic and atraumatic  Global assessment-supple  Neck-No JVD noted, no lymphadenopathy  Pulmonary-clear to auscultation bilaterally, normal respiratory effort  Cardiovascular-normal rate and rhythm, normal heart sounds, S1 and S2 noted  Abdomen-soft, non tender, non distended, normal bowel sounds all 4 quadrants, no hepatosplenomegaly noted  Extremities-No clubbing cyanosis or edema  Neuro-Non focal, converses appropriately, awake, alert, oriented    Imaging Results (most recent)     None        Assessment/Plan   Hernan was seen today for colon cancer screening.    Diagnoses and all orders for this visit:    Hx of colonic polyps  Comments:  The patient states he would like to go home and think " about whether or not he wants to proceed to a colonoscopy after discussing risks and benefits      * Surgery not found *       Body mass index is 20.52 kg/m². Patient's Body mass index is 20.52 kg/m². BMI is within normal parameters. No follow-up required..      All risks, benefits, alternatives, and indications of colonoscopy procedure have been discussed with the patient. Risks to include perforation of the colon requiring possible surgery or colostomy, risk of bleeding from biopsies or removal of colon tissue, possibility of missing a colon polyp or cancer, or adverse drug reaction.  Benefits to include the diagnosis and management of disease of the colon and rectum. Alternatives to include barium enema, radiographic evaluation, lab testing or no intervention. Pt verbalizes understanding and agrees.

## 2020-01-01 ENCOUNTER — APPOINTMENT (OUTPATIENT)
Dept: GENERAL RADIOLOGY | Facility: HOSPITAL | Age: 80
End: 2020-01-01

## 2020-01-01 ENCOUNTER — READMISSION MANAGEMENT (OUTPATIENT)
Dept: CALL CENTER | Facility: HOSPITAL | Age: 80
End: 2020-01-01

## 2020-01-01 ENCOUNTER — APPOINTMENT (OUTPATIENT)
Dept: CT IMAGING | Facility: HOSPITAL | Age: 80
End: 2020-01-01

## 2020-01-01 ENCOUNTER — ANESTHESIA EVENT (OUTPATIENT)
Dept: PERIOP | Facility: HOSPITAL | Age: 80
End: 2020-01-01

## 2020-01-01 ENCOUNTER — HOSPITAL ENCOUNTER (INPATIENT)
Facility: HOSPITAL | Age: 80
LOS: 3 days | Discharge: HOME HEALTH | End: 2020-07-31
Attending: EMERGENCY MEDICINE | Admitting: FAMILY MEDICINE

## 2020-01-01 ENCOUNTER — ANESTHESIA (OUTPATIENT)
Dept: PERIOP | Facility: HOSPITAL | Age: 80
End: 2020-01-01

## 2020-01-01 ENCOUNTER — APPOINTMENT (OUTPATIENT)
Dept: CARDIOLOGY | Facility: HOSPITAL | Age: 80
End: 2020-01-01

## 2020-01-01 VITALS
SYSTOLIC BLOOD PRESSURE: 120 MMHG | BODY MASS INDEX: 23.1 KG/M2 | WEIGHT: 161.38 LBS | DIASTOLIC BLOOD PRESSURE: 70 MMHG | OXYGEN SATURATION: 95 % | HEART RATE: 90 BPM | RESPIRATION RATE: 18 BRPM | TEMPERATURE: 98.8 F | HEIGHT: 70 IN

## 2020-01-01 DIAGNOSIS — T14.8XXA FRACTURE: ICD-10-CM

## 2020-01-01 DIAGNOSIS — S72.002A CLOSED FRACTURE OF LEFT HIP, INITIAL ENCOUNTER (HCC): Primary | ICD-10-CM

## 2020-01-01 DIAGNOSIS — Z74.09 IMPAIRED FUNCTIONAL MOBILITY, BALANCE, GAIT, AND ENDURANCE: ICD-10-CM

## 2020-01-01 DIAGNOSIS — E87.6 HYPOKALEMIA: ICD-10-CM

## 2020-01-01 DIAGNOSIS — Z78.9 DECREASED ACTIVITIES OF DAILY LIVING (ADL): ICD-10-CM

## 2020-01-01 LAB
ABO GROUP BLD: NORMAL
ALBUMIN SERPL-MCNC: 4 G/DL (ref 3.5–5.2)
ALP SERPL-CCNC: 130 U/L (ref 39–117)
ALT SERPL W P-5'-P-CCNC: 34 U/L (ref 1–41)
ANION GAP SERPL CALCULATED.3IONS-SCNC: 13 MMOL/L (ref 5–15)
ANION GAP SERPL CALCULATED.3IONS-SCNC: 9 MMOL/L (ref 5–15)
APTT PPP: 36 SECONDS (ref 24.1–35)
AST SERPL-CCNC: 46 U/L (ref 1–40)
BASOPHILS # BLD AUTO: 0.05 10*3/MM3 (ref 0–0.2)
BASOPHILS NFR BLD AUTO: 0.5 % (ref 0–1.5)
BH CV ECHO MEAS - AO MAX PG (FULL): 3.4 MMHG
BH CV ECHO MEAS - AO MAX PG: 7.3 MMHG
BH CV ECHO MEAS - AO MEAN PG (FULL): 1 MMHG
BH CV ECHO MEAS - AO MEAN PG: 3 MMHG
BH CV ECHO MEAS - AO ROOT AREA (BSA CORRECTED): 1.8
BH CV ECHO MEAS - AO ROOT AREA: 9.1 CM^2
BH CV ECHO MEAS - AO ROOT DIAM: 3.4 CM
BH CV ECHO MEAS - AO V2 MAX: 135 CM/SEC
BH CV ECHO MEAS - AO V2 MEAN: 83.8 CM/SEC
BH CV ECHO MEAS - AO V2 VTI: 19.7 CM
BH CV ECHO MEAS - AVA(I,A): 2.5 CM^2
BH CV ECHO MEAS - AVA(I,D): 2.5 CM^2
BH CV ECHO MEAS - AVA(V,A): 3.1 CM^2
BH CV ECHO MEAS - AVA(V,D): 3.1 CM^2
BH CV ECHO MEAS - BSA(HAYCOCK): 1.9 M^2
BH CV ECHO MEAS - BSA: 1.9 M^2
BH CV ECHO MEAS - BZI_BMI: 23.1 KILOGRAMS/M^2
BH CV ECHO MEAS - BZI_METRIC_HEIGHT: 177.8 CM
BH CV ECHO MEAS - BZI_METRIC_WEIGHT: 73 KG
BH CV ECHO MEAS - EDV(CUBED): 194.1 ML
BH CV ECHO MEAS - EDV(MOD-SP4): 165 ML
BH CV ECHO MEAS - EDV(TEICH): 165.9 ML
BH CV ECHO MEAS - EF(CUBED): 22.9 %
BH CV ECHO MEAS - EF(MOD-SP4): 33.9 %
BH CV ECHO MEAS - EF(TEICH): 18.1 %
BH CV ECHO MEAS - ESV(CUBED): 149.7 ML
BH CV ECHO MEAS - ESV(MOD-SP4): 109 ML
BH CV ECHO MEAS - ESV(TEICH): 135.9 ML
BH CV ECHO MEAS - FS: 8.3 %
BH CV ECHO MEAS - IVS/LVPW: 1.3
BH CV ECHO MEAS - IVSD: 0.98 CM
BH CV ECHO MEAS - LA DIMENSION: 2.6 CM
BH CV ECHO MEAS - LA/AO: 0.76
BH CV ECHO MEAS - LAT PEAK E' VEL: 9.1 CM/SEC
BH CV ECHO MEAS - LV DIASTOLIC VOL/BSA (35-75): 86.7 ML/M^2
BH CV ECHO MEAS - LV MASS(C)D: 192.2 GRAMS
BH CV ECHO MEAS - LV MASS(C)DI: 101 GRAMS/M^2
BH CV ECHO MEAS - LV MAX PG: 3.9 MMHG
BH CV ECHO MEAS - LV MEAN PG: 2 MMHG
BH CV ECHO MEAS - LV SYSTOLIC VOL/BSA (12-30): 57.3 ML/M^2
BH CV ECHO MEAS - LV V1 MAX: 99.2 CM/SEC
BH CV ECHO MEAS - LV V1 MEAN: 55 CM/SEC
BH CV ECHO MEAS - LV V1 VTI: 11.9 CM
BH CV ECHO MEAS - LVIDD: 5.8 CM
BH CV ECHO MEAS - LVIDS: 5.3 CM
BH CV ECHO MEAS - LVLD AP4: 8.3 CM
BH CV ECHO MEAS - LVLS AP4: 7.4 CM
BH CV ECHO MEAS - LVOT AREA (M): 4.2 CM^2
BH CV ECHO MEAS - LVOT AREA: 4.2 CM^2
BH CV ECHO MEAS - LVOT DIAM: 2.3 CM
BH CV ECHO MEAS - LVPWD: 0.74 CM
BH CV ECHO MEAS - MED PEAK E' VEL: 3.92 CM/SEC
BH CV ECHO MEAS - MR MAX PG: 71.1 MMHG
BH CV ECHO MEAS - MR MAX VEL: 417.3 CM/SEC
BH CV ECHO MEAS - MR MEAN PG: 48.3 MMHG
BH CV ECHO MEAS - MR MEAN VEL: 317.3 CM/SEC
BH CV ECHO MEAS - MR VTI: 120.7 CM
BH CV ECHO MEAS - MV DEC TIME: 0.2 SEC
BH CV ECHO MEAS - MV E MAX VEL: 71.5 CM/SEC
BH CV ECHO MEAS - SI(AO): 94 ML/M^2
BH CV ECHO MEAS - SI(CUBED): 23.3 ML/M^2
BH CV ECHO MEAS - SI(LVOT): 26 ML/M^2
BH CV ECHO MEAS - SI(MOD-SP4): 29.4 ML/M^2
BH CV ECHO MEAS - SI(TEICH): 15.7 ML/M^2
BH CV ECHO MEAS - SV(AO): 178.9 ML
BH CV ECHO MEAS - SV(CUBED): 44.4 ML
BH CV ECHO MEAS - SV(LVOT): 49.4 ML
BH CV ECHO MEAS - SV(MOD-SP4): 56 ML
BH CV ECHO MEAS - SV(TEICH): 30 ML
BH CV ECHO MEAS - TR MAX VEL: 145 CM/SEC
BH CV ECHO MEASUREMENTS AVERAGE E/E' RATIO: 10.98
BILIRUB CONJ SERPL-MCNC: 0.3 MG/DL (ref 0–0.3)
BILIRUB INDIRECT SERPL-MCNC: 0.4 MG/DL
BILIRUB SERPL-MCNC: 0.7 MG/DL (ref 0–1.2)
BILIRUB UR QL STRIP: NEGATIVE
BLD GP AB SCN SERPL QL: NEGATIVE
BUN SERPL-MCNC: 8 MG/DL (ref 8–23)
BUN SERPL-MCNC: 9 MG/DL (ref 8–23)
BUN/CREAT SERPL: 10.8 (ref 7–25)
BUN/CREAT SERPL: 13.6 (ref 7–25)
CALCIUM SPEC-SCNC: 8.8 MG/DL (ref 8.6–10.5)
CALCIUM SPEC-SCNC: 9.5 MG/DL (ref 8.6–10.5)
CHLORIDE SERPL-SCNC: 90 MMOL/L (ref 98–107)
CHLORIDE SERPL-SCNC: 92 MMOL/L (ref 98–107)
CLARITY UR: CLEAR
CO2 SERPL-SCNC: 31 MMOL/L (ref 22–29)
CO2 SERPL-SCNC: 31 MMOL/L (ref 22–29)
COLOR UR: YELLOW
CREAT SERPL-MCNC: 0.66 MG/DL (ref 0.76–1.27)
CREAT SERPL-MCNC: 0.74 MG/DL (ref 0.76–1.27)
CYTO UR: NORMAL
DEPRECATED RDW RBC AUTO: 50 FL (ref 37–54)
EOSINOPHIL # BLD AUTO: 0.19 10*3/MM3 (ref 0–0.4)
EOSINOPHIL NFR BLD AUTO: 1.9 % (ref 0.3–6.2)
ERYTHROCYTE [DISTWIDTH] IN BLOOD BY AUTOMATED COUNT: 14.6 % (ref 12.3–15.4)
ETHANOL UR QL: <0.01 %
GFR SERPL CREATININE-BSD FRML MDRD: 102 ML/MIN/1.73
GFR SERPL CREATININE-BSD FRML MDRD: 116 ML/MIN/1.73
GLUCOSE SERPL-MCNC: 112 MG/DL (ref 65–99)
GLUCOSE SERPL-MCNC: 123 MG/DL (ref 65–99)
GLUCOSE UR STRIP-MCNC: NEGATIVE MG/DL
HCT VFR BLD AUTO: 41.7 % (ref 37.5–51)
HCT VFR BLD AUTO: 46 % (ref 37.5–51)
HGB BLD-MCNC: 14.2 G/DL (ref 13–17.7)
HGB BLD-MCNC: 16.1 G/DL (ref 13–17.7)
HGB UR QL STRIP.AUTO: NEGATIVE
IMM GRANULOCYTES # BLD AUTO: 0.05 10*3/MM3 (ref 0–0.05)
IMM GRANULOCYTES NFR BLD AUTO: 0.5 % (ref 0–0.5)
INR PPP: 1.09 (ref 0.91–1.09)
IRON 24H UR-MRATE: 26 MCG/DL (ref 59–158)
IRON SATN MFR SERPL: 9 % (ref 20–50)
KETONES UR QL STRIP: NEGATIVE
LAB AP CASE REPORT: NORMAL
LEFT ATRIUM VOLUME INDEX: 31.1 ML/M2
LEFT ATRIUM VOLUME: 59.1 CM3
LEUKOCYTE ESTERASE UR QL STRIP.AUTO: NEGATIVE
LYMPHOCYTES # BLD AUTO: 1.28 10*3/MM3 (ref 0.7–3.1)
LYMPHOCYTES NFR BLD AUTO: 13.1 % (ref 19.6–45.3)
MAGNESIUM SERPL-MCNC: 1.4 MG/DL (ref 1.6–2.4)
MAXIMAL PREDICTED HEART RATE: 141 BPM
MCH RBC QN AUTO: 32.9 PG (ref 26.6–33)
MCHC RBC AUTO-ENTMCNC: 35 G/DL (ref 31.5–35.7)
MCV RBC AUTO: 93.9 FL (ref 79–97)
MONOCYTES # BLD AUTO: 0.99 10*3/MM3 (ref 0.1–0.9)
MONOCYTES NFR BLD AUTO: 10.1 % (ref 5–12)
NEUTROPHILS NFR BLD AUTO: 7.23 10*3/MM3 (ref 1.7–7)
NEUTROPHILS NFR BLD AUTO: 73.9 % (ref 42.7–76)
NITRITE UR QL STRIP: NEGATIVE
NRBC BLD AUTO-RTO: 0 /100 WBC (ref 0–0.2)
PATH REPORT.FINAL DX SPEC: NORMAL
PATH REPORT.GROSS SPEC: NORMAL
PH UR STRIP.AUTO: 7 [PH] (ref 5–8)
PLATELET # BLD AUTO: 252 10*3/MM3 (ref 140–450)
PMV BLD AUTO: 11 FL (ref 6–12)
POTASSIUM SERPL-SCNC: 3 MMOL/L (ref 3.5–5.2)
POTASSIUM SERPL-SCNC: 4.2 MMOL/L (ref 3.5–5.2)
PROT SERPL-MCNC: 7.5 G/DL (ref 6–8.5)
PROT UR QL STRIP: NEGATIVE
PROTHROMBIN TIME: 13.7 SECONDS (ref 11.9–14.6)
RBC # BLD AUTO: 4.9 10*6/MM3 (ref 4.14–5.8)
RH BLD: POSITIVE
SARS-COV-2 RDRP RESP QL NAA+PROBE: NOT DETECTED
SODIUM SERPL-SCNC: 132 MMOL/L (ref 136–145)
SODIUM SERPL-SCNC: 134 MMOL/L (ref 136–145)
SP GR UR STRIP: 1.01 (ref 1–1.03)
STRESS TARGET HR: 120 BPM
T&S EXPIRATION DATE: NORMAL
TIBC SERPL-MCNC: 304 MCG/DL (ref 298–536)
TRANSFERRIN SERPL-MCNC: 204 MG/DL (ref 200–360)
TROPONIN T SERPL-MCNC: <0.01 NG/ML (ref 0–0.03)
TSH SERPL DL<=0.05 MIU/L-ACNC: 1.46 UIU/ML (ref 0.27–4.2)
UROBILINOGEN UR QL STRIP: ABNORMAL
WBC # BLD AUTO: 9.79 10*3/MM3 (ref 3.4–10.8)

## 2020-01-01 PROCEDURE — 97161 PT EVAL LOW COMPLEX 20 MIN: CPT | Performed by: PHYSICAL THERAPIST

## 2020-01-01 PROCEDURE — 94799 UNLISTED PULMONARY SVC/PX: CPT

## 2020-01-01 PROCEDURE — 97110 THERAPEUTIC EXERCISES: CPT

## 2020-01-01 PROCEDURE — 84484 ASSAY OF TROPONIN QUANT: CPT | Performed by: EMERGENCY MEDICINE

## 2020-01-01 PROCEDURE — 25010000003 POTASSIUM CHLORIDE 10 MEQ/100ML SOLUTION: Performed by: EMERGENCY MEDICINE

## 2020-01-01 PROCEDURE — 85025 COMPLETE CBC W/AUTO DIFF WBC: CPT | Performed by: EMERGENCY MEDICINE

## 2020-01-01 PROCEDURE — 97165 OT EVAL LOW COMPLEX 30 MIN: CPT

## 2020-01-01 PROCEDURE — 80048 BASIC METABOLIC PNL TOTAL CA: CPT | Performed by: EMERGENCY MEDICINE

## 2020-01-01 PROCEDURE — 93010 ELECTROCARDIOGRAM REPORT: CPT | Performed by: INTERNAL MEDICINE

## 2020-01-01 PROCEDURE — 86900 BLOOD TYPING SEROLOGIC ABO: CPT | Performed by: EMERGENCY MEDICINE

## 2020-01-01 PROCEDURE — 0SRS0JA REPLACEMENT OF LEFT HIP JOINT, FEMORAL SURFACE WITH SYNTHETIC SUBSTITUTE, UNCEMENTED, OPEN APPROACH: ICD-10-PCS | Performed by: ORTHOPAEDIC SURGERY

## 2020-01-01 PROCEDURE — 97535 SELF CARE MNGMENT TRAINING: CPT

## 2020-01-01 PROCEDURE — 72125 CT NECK SPINE W/O DYE: CPT

## 2020-01-01 PROCEDURE — 93356 MYOCRD STRAIN IMG SPCKL TRCK: CPT | Performed by: INTERNAL MEDICINE

## 2020-01-01 PROCEDURE — 81003 URINALYSIS AUTO W/O SCOPE: CPT | Performed by: EMERGENCY MEDICINE

## 2020-01-01 PROCEDURE — 99285 EMERGENCY DEPT VISIT HI MDM: CPT

## 2020-01-01 PROCEDURE — 93306 TTE W/DOPPLER COMPLETE: CPT | Performed by: INTERNAL MEDICINE

## 2020-01-01 PROCEDURE — 80048 BASIC METABOLIC PNL TOTAL CA: CPT | Performed by: ORTHOPAEDIC SURGERY

## 2020-01-01 PROCEDURE — 80307 DRUG TEST PRSMV CHEM ANLYZR: CPT | Performed by: EMERGENCY MEDICINE

## 2020-01-01 PROCEDURE — 84443 ASSAY THYROID STIM HORMONE: CPT | Performed by: FAMILY MEDICINE

## 2020-01-01 PROCEDURE — 97116 GAIT TRAINING THERAPY: CPT

## 2020-01-01 PROCEDURE — 94664 DEMO&/EVAL PT USE INHALER: CPT

## 2020-01-01 PROCEDURE — 25010000002 ENOXAPARIN PER 10 MG: Performed by: FAMILY MEDICINE

## 2020-01-01 PROCEDURE — 70450 CT HEAD/BRAIN W/O DYE: CPT

## 2020-01-01 PROCEDURE — 71045 X-RAY EXAM CHEST 1 VIEW: CPT

## 2020-01-01 PROCEDURE — 93005 ELECTROCARDIOGRAM TRACING: CPT | Performed by: EMERGENCY MEDICINE

## 2020-01-01 PROCEDURE — 85014 HEMATOCRIT: CPT | Performed by: ORTHOPAEDIC SURGERY

## 2020-01-01 PROCEDURE — 85730 THROMBOPLASTIN TIME PARTIAL: CPT | Performed by: EMERGENCY MEDICINE

## 2020-01-01 PROCEDURE — 99406 BEHAV CHNG SMOKING 3-10 MIN: CPT

## 2020-01-01 PROCEDURE — 25010000002 MORPHINE SULFATE (PF) 2 MG/ML SOLUTION: Performed by: ANESTHESIOLOGY

## 2020-01-01 PROCEDURE — 25010000002 ENOXAPARIN PER 10 MG: Performed by: ORTHOPAEDIC SURGERY

## 2020-01-01 PROCEDURE — 25010000002 ROPIVACAINE PER 1 MG: Performed by: ANESTHESIOLOGY

## 2020-01-01 PROCEDURE — 84466 ASSAY OF TRANSFERRIN: CPT | Performed by: FAMILY MEDICINE

## 2020-01-01 PROCEDURE — 73552 X-RAY EXAM OF FEMUR 2/>: CPT

## 2020-01-01 PROCEDURE — 93306 TTE W/DOPPLER COMPLETE: CPT

## 2020-01-01 PROCEDURE — 83540 ASSAY OF IRON: CPT | Performed by: FAMILY MEDICINE

## 2020-01-01 PROCEDURE — 73502 X-RAY EXAM HIP UNI 2-3 VIEWS: CPT

## 2020-01-01 PROCEDURE — 93356 MYOCRD STRAIN IMG SPCKL TRCK: CPT

## 2020-01-01 PROCEDURE — 25010000002 MORPHINE SULFATE (PF) 2 MG/ML SOLUTION: Performed by: FAMILY MEDICINE

## 2020-01-01 PROCEDURE — 25010000003 POTASSIUM CHLORIDE 10 MEQ/100ML SOLUTION: Performed by: FAMILY MEDICINE

## 2020-01-01 PROCEDURE — 25010000002 LORAZEPAM PER 2 MG: Performed by: INTERNAL MEDICINE

## 2020-01-01 PROCEDURE — 80076 HEPATIC FUNCTION PANEL: CPT | Performed by: EMERGENCY MEDICINE

## 2020-01-01 PROCEDURE — C1776 JOINT DEVICE (IMPLANTABLE): HCPCS | Performed by: ORTHOPAEDIC SURGERY

## 2020-01-01 PROCEDURE — 87635 SARS-COV-2 COVID-19 AMP PRB: CPT | Performed by: EMERGENCY MEDICINE

## 2020-01-01 PROCEDURE — 85610 PROTHROMBIN TIME: CPT | Performed by: EMERGENCY MEDICINE

## 2020-01-01 PROCEDURE — 25010000002 PERFLUTREN 6.52 MG/ML SUSPENSION: Performed by: FAMILY MEDICINE

## 2020-01-01 PROCEDURE — 25010000002 CEFAZOLIN PER 500 MG: Performed by: ORTHOPAEDIC SURGERY

## 2020-01-01 PROCEDURE — 85018 HEMOGLOBIN: CPT | Performed by: ORTHOPAEDIC SURGERY

## 2020-01-01 PROCEDURE — 86850 RBC ANTIBODY SCREEN: CPT | Performed by: EMERGENCY MEDICINE

## 2020-01-01 PROCEDURE — 25010000002 PROPOFOL 10 MG/ML EMULSION: Performed by: NURSE ANESTHETIST, CERTIFIED REGISTERED

## 2020-01-01 PROCEDURE — 25010000002 FENTANYL CITRATE (PF) 100 MCG/2ML SOLUTION: Performed by: ANESTHESIOLOGY

## 2020-01-01 PROCEDURE — 88311 DECALCIFY TISSUE: CPT | Performed by: ORTHOPAEDIC SURGERY

## 2020-01-01 PROCEDURE — 88305 TISSUE EXAM BY PATHOLOGIST: CPT | Performed by: ORTHOPAEDIC SURGERY

## 2020-01-01 PROCEDURE — 97530 THERAPEUTIC ACTIVITIES: CPT

## 2020-01-01 PROCEDURE — 25010000002 ONDANSETRON PER 1 MG: Performed by: EMERGENCY MEDICINE

## 2020-01-01 PROCEDURE — 25010000002 FENTANYL CITRATE (PF) 100 MCG/2ML SOLUTION: Performed by: NURSE ANESTHETIST, CERTIFIED REGISTERED

## 2020-01-01 PROCEDURE — 25010000002 MAGNESIUM SULFATE IN D5W 1G/100ML (PREMIX) 1-5 GM/100ML-% SOLUTION: Performed by: FAMILY MEDICINE

## 2020-01-01 PROCEDURE — 25010000002 MORPHINE PER 10 MG: Performed by: EMERGENCY MEDICINE

## 2020-01-01 PROCEDURE — 86901 BLOOD TYPING SEROLOGIC RH(D): CPT | Performed by: EMERGENCY MEDICINE

## 2020-01-01 PROCEDURE — 83735 ASSAY OF MAGNESIUM: CPT | Performed by: FAMILY MEDICINE

## 2020-01-01 PROCEDURE — L1830 KO IMMOB CANVAS LONG PRE OTS: HCPCS | Performed by: ORTHOPAEDIC SURGERY

## 2020-01-01 DEVICE — CORAIL HIP SYSTEM CEMENTLESS FEMORAL STEM 12/14 AMT 135 DEGREES KA SIZE 14 HA COATED STANDARD COLLAR
Type: IMPLANTABLE DEVICE | Status: FUNCTIONAL
Brand: CORAIL

## 2020-01-01 DEVICE — SELF CENTERING BI-POLAR HEAD 28MM ID 51MM OD
Type: IMPLANTABLE DEVICE | Status: FUNCTIONAL
Brand: SELF CENTERING

## 2020-01-01 DEVICE — CAP BIPOL HIP CORAIL ACTIS: Type: IMPLANTABLE DEVICE | Status: FUNCTIONAL

## 2020-01-01 DEVICE — ARTICUL/EZE FEMORAL HEAD DIAMETER 28MM +1.5 12/14 TAPER
Type: IMPLANTABLE DEVICE | Status: FUNCTIONAL
Brand: ARTICUL/EZE

## 2020-01-01 RX ORDER — DILTIAZEM HYDROCHLORIDE 60 MG/1
60 TABLET, FILM COATED ORAL EVERY 6 HOURS SCHEDULED
Status: DISCONTINUED | OUTPATIENT
Start: 2020-01-01 | End: 2020-01-01

## 2020-01-01 RX ORDER — HYDROCODONE BITARTRATE AND ACETAMINOPHEN 7.5; 325 MG/1; MG/1
1 TABLET ORAL EVERY 4 HOURS PRN
Status: DISCONTINUED | OUTPATIENT
Start: 2020-01-01 | End: 2020-01-01 | Stop reason: HOSPADM

## 2020-01-01 RX ORDER — LORAZEPAM 0.5 MG/1
0.5 TABLET ORAL EVERY 6 HOURS
Status: COMPLETED | OUTPATIENT
Start: 2020-01-01 | End: 2020-01-01

## 2020-01-01 RX ORDER — FENTANYL CITRATE 50 UG/ML
25 INJECTION, SOLUTION INTRAMUSCULAR; INTRAVENOUS
Status: DISCONTINUED | OUTPATIENT
Start: 2020-01-01 | End: 2020-01-01 | Stop reason: HOSPADM

## 2020-01-01 RX ORDER — ACETAMINOPHEN 650 MG/1
650 SUPPOSITORY RECTAL EVERY 4 HOURS PRN
Status: DISCONTINUED | OUTPATIENT
Start: 2020-01-01 | End: 2020-01-01 | Stop reason: HOSPADM

## 2020-01-01 RX ORDER — SODIUM CHLORIDE, SODIUM LACTATE, POTASSIUM CHLORIDE, CALCIUM CHLORIDE 600; 310; 30; 20 MG/100ML; MG/100ML; MG/100ML; MG/100ML
100 INJECTION, SOLUTION INTRAVENOUS CONTINUOUS
Status: DISCONTINUED | OUTPATIENT
Start: 2020-01-01 | End: 2020-01-01 | Stop reason: HOSPADM

## 2020-01-01 RX ORDER — LORAZEPAM 2 MG/ML
0.5 INJECTION INTRAMUSCULAR
Status: DISCONTINUED | OUTPATIENT
Start: 2020-01-01 | End: 2020-01-01 | Stop reason: HOSPADM

## 2020-01-01 RX ORDER — SODIUM CHLORIDE 9 MG/ML
75 INJECTION, SOLUTION INTRAVENOUS CONTINUOUS
Status: DISCONTINUED | OUTPATIENT
Start: 2020-01-01 | End: 2020-01-01 | Stop reason: HOSPADM

## 2020-01-01 RX ORDER — DOCUSATE SODIUM 100 MG/1
100 CAPSULE, LIQUID FILLED ORAL 2 TIMES DAILY PRN
Status: DISCONTINUED | OUTPATIENT
Start: 2020-01-01 | End: 2020-01-01 | Stop reason: HOSPADM

## 2020-01-01 RX ORDER — SODIUM CHLORIDE 0.9 % (FLUSH) 0.9 %
1-10 SYRINGE (ML) INJECTION AS NEEDED
Status: DISCONTINUED | OUTPATIENT
Start: 2020-01-01 | End: 2020-01-01 | Stop reason: HOSPADM

## 2020-01-01 RX ORDER — FLUMAZENIL 0.1 MG/ML
0.2 INJECTION INTRAVENOUS AS NEEDED
Status: DISCONTINUED | OUTPATIENT
Start: 2020-01-01 | End: 2020-01-01 | Stop reason: HOSPADM

## 2020-01-01 RX ORDER — LORAZEPAM 0.5 MG/1
0.5 TABLET ORAL EVERY 8 HOURS
Status: DISCONTINUED | OUTPATIENT
Start: 2020-01-01 | End: 2020-01-01 | Stop reason: HOSPADM

## 2020-01-01 RX ORDER — LABETALOL HYDROCHLORIDE 5 MG/ML
5 INJECTION, SOLUTION INTRAVENOUS
Status: DISCONTINUED | OUTPATIENT
Start: 2020-01-01 | End: 2020-01-01 | Stop reason: HOSPADM

## 2020-01-01 RX ORDER — PROMETHAZINE HYDROCHLORIDE 25 MG/ML
12.5 INJECTION, SOLUTION INTRAMUSCULAR; INTRAVENOUS EVERY 6 HOURS PRN
Status: DISCONTINUED | OUTPATIENT
Start: 2020-01-01 | End: 2020-01-01 | Stop reason: HOSPADM

## 2020-01-01 RX ORDER — MORPHINE SULFATE 2 MG/ML
2 INJECTION, SOLUTION INTRAMUSCULAR; INTRAVENOUS
Status: DISCONTINUED | OUTPATIENT
Start: 2020-01-01 | End: 2020-01-01 | Stop reason: HOSPADM

## 2020-01-01 RX ORDER — LORAZEPAM 1 MG/1
1 TABLET ORAL
Status: DISCONTINUED | OUTPATIENT
Start: 2020-01-01 | End: 2020-01-01 | Stop reason: HOSPADM

## 2020-01-01 RX ORDER — LORAZEPAM 2 MG/ML
1 INJECTION INTRAMUSCULAR
Status: DISCONTINUED | OUTPATIENT
Start: 2020-01-01 | End: 2020-01-01 | Stop reason: HOSPADM

## 2020-01-01 RX ORDER — MORPHINE SULFATE 2 MG/ML
2 INJECTION, SOLUTION INTRAMUSCULAR; INTRAVENOUS EVERY 4 HOURS PRN
Status: DISCONTINUED | OUTPATIENT
Start: 2020-01-01 | End: 2020-01-01

## 2020-01-01 RX ORDER — LIDOCAINE HYDROCHLORIDE 10 MG/ML
0.5 INJECTION, SOLUTION EPIDURAL; INFILTRATION; INTRACAUDAL; PERINEURAL ONCE AS NEEDED
Status: DISCONTINUED | OUTPATIENT
Start: 2020-01-01 | End: 2020-01-01 | Stop reason: HOSPADM

## 2020-01-01 RX ORDER — OXYCODONE AND ACETAMINOPHEN 7.5; 325 MG/1; MG/1
2 TABLET ORAL ONCE AS NEEDED
Status: DISCONTINUED | OUTPATIENT
Start: 2020-01-01 | End: 2020-01-01 | Stop reason: HOSPADM

## 2020-01-01 RX ORDER — DILTIAZEM HCL 90 MG
90 TABLET ORAL EVERY 6 HOURS SCHEDULED
Status: DISCONTINUED | OUTPATIENT
Start: 2020-01-01 | End: 2020-01-01 | Stop reason: HOSPADM

## 2020-01-01 RX ORDER — LORAZEPAM 0.5 MG/1
0.5 TABLET ORAL
Status: DISCONTINUED | OUTPATIENT
Start: 2020-01-01 | End: 2020-01-01 | Stop reason: HOSPADM

## 2020-01-01 RX ORDER — ACETAMINOPHEN 325 MG/1
650 TABLET ORAL EVERY 4 HOURS PRN
Status: DISCONTINUED | OUTPATIENT
Start: 2020-01-01 | End: 2020-01-01 | Stop reason: HOSPADM

## 2020-01-01 RX ORDER — POTASSIUM CHLORIDE 7.45 MG/ML
10 INJECTION INTRAVENOUS
Status: DISCONTINUED | OUTPATIENT
Start: 2020-01-01 | End: 2020-01-01 | Stop reason: HOSPADM

## 2020-01-01 RX ORDER — ROCURONIUM BROMIDE 10 MG/ML
INJECTION, SOLUTION INTRAVENOUS AS NEEDED
Status: DISCONTINUED | OUTPATIENT
Start: 2020-01-01 | End: 2020-01-01 | Stop reason: SURG

## 2020-01-01 RX ORDER — NICOTINE 21 MG/24HR
1 PATCH, TRANSDERMAL 24 HOURS TRANSDERMAL
Status: DISCONTINUED | OUTPATIENT
Start: 2020-01-01 | End: 2020-01-01 | Stop reason: HOSPADM

## 2020-01-01 RX ORDER — DEXTROSE MONOHYDRATE 25 G/50ML
12.5 INJECTION, SOLUTION INTRAVENOUS AS NEEDED
Status: DISCONTINUED | OUTPATIENT
Start: 2020-01-01 | End: 2020-01-01 | Stop reason: HOSPADM

## 2020-01-01 RX ORDER — BUPIVACAINE HCL/0.9 % NACL/PF 0.1 %
2 PLASTIC BAG, INJECTION (ML) EPIDURAL EVERY 8 HOURS
Status: COMPLETED | OUTPATIENT
Start: 2020-01-01 | End: 2020-01-01

## 2020-01-01 RX ORDER — SODIUM CHLORIDE, SODIUM LACTATE, POTASSIUM CHLORIDE, CALCIUM CHLORIDE 600; 310; 30; 20 MG/100ML; MG/100ML; MG/100ML; MG/100ML
INJECTION, SOLUTION INTRAVENOUS CONTINUOUS PRN
Status: DISCONTINUED | OUTPATIENT
Start: 2020-01-01 | End: 2020-01-01 | Stop reason: SURG

## 2020-01-01 RX ORDER — OXYCODONE AND ACETAMINOPHEN 10; 325 MG/1; MG/1
1 TABLET ORAL ONCE AS NEEDED
Status: DISCONTINUED | OUTPATIENT
Start: 2020-01-01 | End: 2020-01-01 | Stop reason: HOSPADM

## 2020-01-01 RX ORDER — NALOXONE HCL 0.4 MG/ML
0.4 VIAL (ML) INJECTION
Status: DISCONTINUED | OUTPATIENT
Start: 2020-01-01 | End: 2020-01-01 | Stop reason: HOSPADM

## 2020-01-01 RX ORDER — MIDAZOLAM HYDROCHLORIDE 1 MG/ML
1 INJECTION INTRAMUSCULAR; INTRAVENOUS
Status: DISCONTINUED | OUTPATIENT
Start: 2020-01-01 | End: 2020-01-01 | Stop reason: HOSPADM

## 2020-01-01 RX ORDER — PROPOFOL 10 MG/ML
VIAL (ML) INTRAVENOUS AS NEEDED
Status: DISCONTINUED | OUTPATIENT
Start: 2020-01-01 | End: 2020-01-01 | Stop reason: SURG

## 2020-01-01 RX ORDER — SODIUM CHLORIDE 0.9 % (FLUSH) 0.9 %
10 SYRINGE (ML) INJECTION EVERY 12 HOURS SCHEDULED
Status: DISCONTINUED | OUTPATIENT
Start: 2020-01-01 | End: 2020-01-01 | Stop reason: HOSPADM

## 2020-01-01 RX ORDER — ONDANSETRON 2 MG/ML
4 INJECTION INTRAMUSCULAR; INTRAVENOUS AS NEEDED
Status: DISCONTINUED | OUTPATIENT
Start: 2020-01-01 | End: 2020-01-01 | Stop reason: HOSPADM

## 2020-01-01 RX ORDER — ONDANSETRON 2 MG/ML
4 INJECTION INTRAMUSCULAR; INTRAVENOUS ONCE
Status: COMPLETED | OUTPATIENT
Start: 2020-01-01 | End: 2020-01-01

## 2020-01-01 RX ORDER — DILTIAZEM HYDROCHLORIDE 360 MG/1
360 CAPSULE, EXTENDED RELEASE ORAL DAILY
Qty: 30 CAPSULE | Refills: 11 | Status: SHIPPED | OUTPATIENT
Start: 2020-01-01 | End: 2021-07-31

## 2020-01-01 RX ORDER — NALOXONE HCL 0.4 MG/ML
0.04 VIAL (ML) INJECTION AS NEEDED
Status: DISCONTINUED | OUTPATIENT
Start: 2020-01-01 | End: 2020-01-01 | Stop reason: HOSPADM

## 2020-01-01 RX ORDER — SODIUM CHLORIDE 0.9 % (FLUSH) 0.9 %
3 SYRINGE (ML) INJECTION EVERY 12 HOURS SCHEDULED
Status: DISCONTINUED | OUTPATIENT
Start: 2020-01-01 | End: 2020-01-01 | Stop reason: HOSPADM

## 2020-01-01 RX ORDER — ROPIVACAINE HYDROCHLORIDE 2 MG/ML
INJECTION, SOLUTION EPIDURAL; INFILTRATION; PERINEURAL
Status: COMPLETED | OUTPATIENT
Start: 2020-01-01 | End: 2020-01-01

## 2020-01-01 RX ORDER — FAMOTIDINE 20 MG/1
40 TABLET, FILM COATED ORAL DAILY
Status: DISCONTINUED | OUTPATIENT
Start: 2020-01-01 | End: 2020-01-01 | Stop reason: HOSPADM

## 2020-01-01 RX ORDER — FENTANYL CITRATE 50 UG/ML
INJECTION, SOLUTION INTRAMUSCULAR; INTRAVENOUS AS NEEDED
Status: DISCONTINUED | OUTPATIENT
Start: 2020-01-01 | End: 2020-01-01 | Stop reason: SURG

## 2020-01-01 RX ORDER — SODIUM CHLORIDE, SODIUM LACTATE, POTASSIUM CHLORIDE, CALCIUM CHLORIDE 600; 310; 30; 20 MG/100ML; MG/100ML; MG/100ML; MG/100ML
9 INJECTION, SOLUTION INTRAVENOUS CONTINUOUS
Status: DISCONTINUED | OUTPATIENT
Start: 2020-01-01 | End: 2020-01-01 | Stop reason: HOSPADM

## 2020-01-01 RX ORDER — ONDANSETRON 4 MG/1
4 TABLET, FILM COATED ORAL EVERY 6 HOURS PRN
Status: DISCONTINUED | OUTPATIENT
Start: 2020-01-01 | End: 2020-01-01 | Stop reason: HOSPADM

## 2020-01-01 RX ORDER — MAGNESIUM HYDROXIDE 1200 MG/15ML
LIQUID ORAL AS NEEDED
Status: DISCONTINUED | OUTPATIENT
Start: 2020-01-01 | End: 2020-01-01 | Stop reason: HOSPADM

## 2020-01-01 RX ORDER — BUPIVACAINE HCL/0.9 % NACL/PF 0.1 %
2 PLASTIC BAG, INJECTION (ML) EPIDURAL ONCE
Status: COMPLETED | OUTPATIENT
Start: 2020-01-01 | End: 2020-01-01

## 2020-01-01 RX ORDER — SODIUM CHLORIDE 0.9 % (FLUSH) 0.9 %
10 SYRINGE (ML) INJECTION AS NEEDED
Status: DISCONTINUED | OUTPATIENT
Start: 2020-01-01 | End: 2020-01-01 | Stop reason: HOSPADM

## 2020-01-01 RX ORDER — HYDROCODONE BITARTRATE AND ACETAMINOPHEN 7.5; 325 MG/1; MG/1
2 TABLET ORAL EVERY 4 HOURS PRN
Status: DISCONTINUED | OUTPATIENT
Start: 2020-01-01 | End: 2020-01-01 | Stop reason: HOSPADM

## 2020-01-01 RX ORDER — MAGNESIUM SULFATE 1 G/100ML
1 INJECTION INTRAVENOUS ONCE
Status: COMPLETED | OUTPATIENT
Start: 2020-01-01 | End: 2020-01-01

## 2020-01-01 RX ORDER — DILTIAZEM HYDROCHLORIDE 5 MG/ML
10 INJECTION INTRAVENOUS ONCE
Status: COMPLETED | OUTPATIENT
Start: 2020-01-01 | End: 2020-01-01

## 2020-01-01 RX ORDER — POTASSIUM CHLORIDE 7.45 MG/ML
10 INJECTION INTRAVENOUS ONCE
Status: COMPLETED | OUTPATIENT
Start: 2020-01-01 | End: 2020-01-01

## 2020-01-01 RX ORDER — ONDANSETRON 2 MG/ML
4 INJECTION INTRAMUSCULAR; INTRAVENOUS EVERY 6 HOURS PRN
Status: DISCONTINUED | OUTPATIENT
Start: 2020-01-01 | End: 2020-01-01 | Stop reason: HOSPADM

## 2020-01-01 RX ADMIN — HYDROCODONE BITARTRATE AND ACETAMINOPHEN 2 TABLET: 7.5; 325 TABLET ORAL at 08:53

## 2020-01-01 RX ADMIN — DILTIAZEM HYDROCHLORIDE 60 MG: 60 TABLET, FILM COATED ORAL at 05:42

## 2020-01-01 RX ADMIN — MAGNESIUM SULFATE 1 G: 1 INJECTION INTRAVENOUS at 18:15

## 2020-01-01 RX ADMIN — SODIUM CHLORIDE 75 ML/HR: 9 INJECTION, SOLUTION INTRAVENOUS at 09:37

## 2020-01-01 RX ADMIN — SODIUM CHLORIDE, POTASSIUM CHLORIDE, SODIUM LACTATE AND CALCIUM CHLORIDE: 600; 310; 30; 20 INJECTION, SOLUTION INTRAVENOUS at 19:21

## 2020-01-01 RX ADMIN — POTASSIUM CHLORIDE 10 MEQ: 7.46 INJECTION, SOLUTION INTRAVENOUS at 11:23

## 2020-01-01 RX ADMIN — SODIUM CHLORIDE, POTASSIUM CHLORIDE, SODIUM LACTATE AND CALCIUM CHLORIDE 100 ML/HR: 600; 310; 30; 20 INJECTION, SOLUTION INTRAVENOUS at 04:32

## 2020-01-01 RX ADMIN — FENTANYL CITRATE 100 MCG: 50 INJECTION, SOLUTION INTRAMUSCULAR; INTRAVENOUS at 19:24

## 2020-01-01 RX ADMIN — CEFAZOLIN SODIUM 2 G: 10 INJECTION, POWDER, FOR SOLUTION INTRAVENOUS at 11:34

## 2020-01-01 RX ADMIN — DILTIAZEM HYDROCHLORIDE 90 MG: 90 TABLET, FILM COATED ORAL at 04:41

## 2020-01-01 RX ADMIN — HYDROCODONE BITARTRATE AND ACETAMINOPHEN 1 TABLET: 7.5; 325 TABLET ORAL at 08:09

## 2020-01-01 RX ADMIN — HYDROCODONE BITARTRATE AND ACETAMINOPHEN 1 TABLET: 7.5; 325 TABLET ORAL at 23:26

## 2020-01-01 RX ADMIN — FAMOTIDINE 40 MG: 20 TABLET, FILM COATED ORAL at 08:56

## 2020-01-01 RX ADMIN — SODIUM CHLORIDE, POTASSIUM CHLORIDE, SODIUM LACTATE AND CALCIUM CHLORIDE 100 ML/HR: 600; 310; 30; 20 INJECTION, SOLUTION INTRAVENOUS at 05:42

## 2020-01-01 RX ADMIN — DILTIAZEM HYDROCHLORIDE 60 MG: 60 TABLET, FILM COATED ORAL at 20:07

## 2020-01-01 RX ADMIN — ENOXAPARIN SODIUM 40 MG: 40 INJECTION SUBCUTANEOUS at 08:53

## 2020-01-01 RX ADMIN — DILTIAZEM HYDROCHLORIDE 60 MG: 60 TABLET, FILM COATED ORAL at 00:13

## 2020-01-01 RX ADMIN — PERFLUTREN: 6.52 INJECTION, SUSPENSION INTRAVENOUS at 10:52

## 2020-01-01 RX ADMIN — SODIUM CHLORIDE, POTASSIUM CHLORIDE, SODIUM LACTATE AND CALCIUM CHLORIDE 9 ML/HR: 600; 310; 30; 20 INJECTION, SOLUTION INTRAVENOUS at 17:10

## 2020-01-01 RX ADMIN — FAMOTIDINE 40 MG: 20 TABLET, FILM COATED ORAL at 08:10

## 2020-01-01 RX ADMIN — LIDOCAINE HYDROCHLORIDE 100 MG: 20 INJECTION, SOLUTION INTRAVENOUS at 19:24

## 2020-01-01 RX ADMIN — SODIUM CHLORIDE 500 ML: 9 INJECTION, SOLUTION INTRAVENOUS at 06:45

## 2020-01-01 RX ADMIN — LORAZEPAM 0.5 MG: 0.5 TABLET ORAL at 12:41

## 2020-01-01 RX ADMIN — SODIUM CHLORIDE, PRESERVATIVE FREE 3 ML: 5 INJECTION INTRAVENOUS at 20:07

## 2020-01-01 RX ADMIN — MORPHINE SULFATE 4 MG: 4 INJECTION, SOLUTION INTRAMUSCULAR; INTRAVENOUS at 05:35

## 2020-01-01 RX ADMIN — LORAZEPAM 0.5 MG: 0.5 TABLET ORAL at 18:15

## 2020-01-01 RX ADMIN — PROPOFOL 120 MG: 10 INJECTION, EMULSION INTRAVENOUS at 19:24

## 2020-01-01 RX ADMIN — MORPHINE SULFATE 2 MG: 2 INJECTION, SOLUTION INTRAMUSCULAR; INTRAVENOUS at 15:25

## 2020-01-01 RX ADMIN — SODIUM CHLORIDE, PRESERVATIVE FREE 3 ML: 5 INJECTION INTRAVENOUS at 22:22

## 2020-01-01 RX ADMIN — POTASSIUM CHLORIDE 10 MEQ: 7.46 INJECTION, SOLUTION INTRAVENOUS at 06:46

## 2020-01-01 RX ADMIN — ONDANSETRON HYDROCHLORIDE 4 MG: 2 SOLUTION INTRAMUSCULAR; INTRAVENOUS at 05:35

## 2020-01-01 RX ADMIN — SUGAMMADEX 200 MG: 100 INJECTION, SOLUTION INTRAVENOUS at 20:27

## 2020-01-01 RX ADMIN — DILTIAZEM HYDROCHLORIDE 60 MG: 60 TABLET, FILM COATED ORAL at 12:41

## 2020-01-01 RX ADMIN — Medication 2 G: at 19:29

## 2020-01-01 RX ADMIN — HYDROCODONE BITARTRATE AND ACETAMINOPHEN 1 TABLET: 7.5; 325 TABLET ORAL at 20:12

## 2020-01-01 RX ADMIN — LABETALOL HYDROCHLORIDE 5 MG: 5 INJECTION, SOLUTION INTRAVENOUS at 20:53

## 2020-01-01 RX ADMIN — POTASSIUM CHLORIDE 10 MEQ: 7.46 INJECTION, SOLUTION INTRAVENOUS at 09:37

## 2020-01-01 RX ADMIN — CEFAZOLIN SODIUM 2 G: 10 INJECTION, POWDER, FOR SOLUTION INTRAVENOUS at 04:28

## 2020-01-01 RX ADMIN — SODIUM CHLORIDE, POTASSIUM CHLORIDE, SODIUM LACTATE AND CALCIUM CHLORIDE 100 ML/HR: 600; 310; 30; 20 INJECTION, SOLUTION INTRAVENOUS at 23:26

## 2020-01-01 RX ADMIN — FENTANYL CITRATE 50 MCG: 50 INJECTION, SOLUTION INTRAMUSCULAR; INTRAVENOUS at 17:13

## 2020-01-01 RX ADMIN — MORPHINE SULFATE 2 MG: 2 INJECTION, SOLUTION INTRAMUSCULAR; INTRAVENOUS at 20:53

## 2020-01-01 RX ADMIN — POTASSIUM CHLORIDE 10 MEQ: 7.46 INJECTION, SOLUTION INTRAVENOUS at 16:50

## 2020-01-01 RX ADMIN — LORAZEPAM 1 MG: 2 INJECTION INTRAMUSCULAR; INTRAVENOUS at 22:23

## 2020-01-01 RX ADMIN — ENOXAPARIN SODIUM 70 MG: 80 INJECTION SUBCUTANEOUS at 05:48

## 2020-01-01 RX ADMIN — MORPHINE SULFATE 4 MG: 4 INJECTION, SOLUTION INTRAMUSCULAR; INTRAVENOUS at 06:28

## 2020-01-01 RX ADMIN — ENOXAPARIN SODIUM 70 MG: 80 INJECTION SUBCUTANEOUS at 18:22

## 2020-01-01 RX ADMIN — POTASSIUM CHLORIDE 10 MEQ: 7.46 INJECTION, SOLUTION INTRAVENOUS at 15:24

## 2020-01-01 RX ADMIN — DILTIAZEM HYDROCHLORIDE 90 MG: 90 TABLET, FILM COATED ORAL at 18:22

## 2020-01-01 RX ADMIN — SODIUM CHLORIDE, POTASSIUM CHLORIDE, SODIUM LACTATE AND CALCIUM CHLORIDE: 600; 310; 30; 20 INJECTION, SOLUTION INTRAVENOUS at 20:19

## 2020-01-01 RX ADMIN — LORAZEPAM 0.5 MG: 0.5 TABLET ORAL at 18:20

## 2020-01-01 RX ADMIN — HYDROCODONE BITARTRATE AND ACETAMINOPHEN 2 TABLET: 7.5; 325 TABLET ORAL at 13:56

## 2020-01-01 RX ADMIN — SODIUM CHLORIDE, POTASSIUM CHLORIDE, SODIUM LACTATE AND CALCIUM CHLORIDE 100 ML/HR: 600; 310; 30; 20 INJECTION, SOLUTION INTRAVENOUS at 18:27

## 2020-01-01 RX ADMIN — MILK OF MAGNESIA 10 ML: 2400 CONCENTRATE ORAL at 18:31

## 2020-01-01 RX ADMIN — HYDROCODONE BITARTRATE AND ACETAMINOPHEN 1 TABLET: 7.5; 325 TABLET ORAL at 08:57

## 2020-01-01 RX ADMIN — ENOXAPARIN SODIUM 70 MG: 80 INJECTION SUBCUTANEOUS at 18:15

## 2020-01-01 RX ADMIN — HYDROCODONE BITARTRATE AND ACETAMINOPHEN 1 TABLET: 7.5; 325 TABLET ORAL at 17:29

## 2020-01-01 RX ADMIN — LABETALOL HYDROCHLORIDE 5 MG: 5 INJECTION, SOLUTION INTRAVENOUS at 20:58

## 2020-01-01 RX ADMIN — POTASSIUM CHLORIDE 10 MEQ: 7.46 INJECTION, SOLUTION INTRAVENOUS at 13:27

## 2020-01-01 RX ADMIN — ROCURONIUM BROMIDE 40 MG: 10 INJECTION INTRAVENOUS at 19:24

## 2020-01-01 RX ADMIN — ROCURONIUM BROMIDE 10 MG: 10 INJECTION INTRAVENOUS at 20:02

## 2020-01-01 RX ADMIN — LORAZEPAM 0.5 MG: 0.5 TABLET ORAL at 00:13

## 2020-01-01 RX ADMIN — ROPIVACAINE HYDROCHLORIDE 30 ML: 2 INJECTION, SOLUTION EPIDURAL; INFILTRATION at 17:18

## 2020-01-01 RX ADMIN — SODIUM CHLORIDE, PRESERVATIVE FREE 3 ML: 5 INJECTION INTRAVENOUS at 23:27

## 2020-01-01 RX ADMIN — HYDROCODONE BITARTRATE AND ACETAMINOPHEN 2 TABLET: 7.5; 325 TABLET ORAL at 04:31

## 2020-01-01 RX ADMIN — ENOXAPARIN SODIUM 70 MG: 80 INJECTION SUBCUTANEOUS at 07:14

## 2020-01-01 RX ADMIN — FAMOTIDINE 40 MG: 20 TABLET, FILM COATED ORAL at 08:53

## 2020-01-01 RX ADMIN — MORPHINE SULFATE 2 MG: 2 INJECTION, SOLUTION INTRAMUSCULAR; INTRAVENOUS at 11:11

## 2020-01-01 RX ADMIN — NICOTINE 1 PATCH: 21 PATCH, EXTENDED RELEASE TRANSDERMAL at 07:15

## 2020-01-01 RX ADMIN — LORAZEPAM 0.5 MG: 0.5 TABLET ORAL at 05:42

## 2020-01-01 RX ADMIN — DILTIAZEM HYDROCHLORIDE 10 MG: 5 INJECTION INTRAVENOUS at 18:15

## 2020-01-01 RX ADMIN — SODIUM CHLORIDE, PRESERVATIVE FREE 3 ML: 5 INJECTION INTRAVENOUS at 08:57

## 2020-01-27 NOTE — TELEPHONE ENCOUNTER
"Subjective:       Patient ID: Sylvia Rivera is a 58 y.o. female.    Vitals:  height is 5' 4" (1.626 m) and weight is 62.1 kg (137 lb). Her oral temperature is 98.6 °F (37 °C). Her blood pressure is 136/92 (abnormal) and her pulse is 75. Her respiration is 18 and oxygen saturation is 99%.     Chief Complaint: Nose Lesion    Pt present today with Sore in Nose. Symptom started Sat. Pt was cleaning her nose with a nose hair removal on Friday morning and it's been hurting. Pt has been using Mupirocin.    Facial Injury    The incident occurred 3 to 5 days ago. The quality of the pain is described as aching. The pain is at a severity of 8/10. The pain is mild. The pain has been constant since the injury. Pertinent negatives include no blurred vision, disorientation, headaches, memory loss, numbness, tinnitus, vomiting or weakness. She has tried acetaminophen for the symptoms. The treatment provided no relief.       HENT: Positive for facial trauma. Negative for tinnitus.    Eyes: Negative for blurred vision.   Gastrointestinal: Negative for vomiting.   Neurological: Negative for headaches, disorientation and numbness.   Psychiatric/Behavioral: Negative for disorientation.       Objective:      Physical Exam   Constitutional: She is oriented to person, place, and time. She appears well-developed and well-nourished.   HENT:   Head: Normocephalic and atraumatic.   Right Ear: External ear normal.   Left Ear: External ear normal.   Nose: Sinus tenderness present.       Mouth/Throat: Oropharynx is clear and moist.   Eyes: Pupils are equal, round, and reactive to light. Conjunctivae, EOM and lids are normal.   Neck: Trachea normal, full passive range of motion without pain and phonation normal. Neck supple.   Musculoskeletal: Normal range of motion.   Neurological: She is alert and oriented to person, place, and time.   Skin: Skin is warm, dry and intact.   Psychiatric: She has a normal mood and affect. Her speech is normal and " Patient called, needs hydrocodone refilled. Per patient request, I will mail him a records release for us to send his records to [facility]. behavior is normal. Judgment and thought content normal. Cognition and memory are normal.   Nursing note and vitals reviewed.        Assessment:       1. Infected lesion in nose        Plan:         Infected lesion in nose    Other orders  -     sulfamethoxazole-trimethoprim 800-160mg (BACTRIM DS) 800-160 mg Tab; Take 1 tablet by mouth 2 (two) times daily.  Dispense: 20 tablet; Refill: 0  -     mupirocin (BACTROBAN) 2 % ointment; Apply to affected area 3 times daily  Dispense: 22 g; Refill: 1

## 2020-07-28 PROBLEM — S72.002A CLOSED FRACTURE OF LEFT HIP (HCC): Status: ACTIVE | Noted: 2020-01-01

## 2020-07-28 NOTE — ANESTHESIA PREPROCEDURE EVALUATION
Anesthesia Evaluation     Patient summary reviewed   no history of anesthetic complications:  NPO Solid Status: > 8 hours             Airway   Mallampati: II  TM distance: >3 FB  Neck ROM: full  Dental    (+) poor dentition    Pulmonary    (+) a smoker, COPD,   (-) asthma, sleep apnea  Cardiovascular   Exercise tolerance: good (4-7 METS)    ECG reviewed    (+) hypertension, past MI  >12 months, CAD, cardiac stents (10/2018) Drug eluting stent hyperlipidemia,   (-) pacemaker, angina      Neuro/Psych  (-) seizures, TIA, CVA  GI/Hepatic/Renal/Endo    (-) GERD, liver disease, no renal disease, diabetes    Musculoskeletal     Abdominal    Substance History      OB/GYN          Other                        Anesthesia Plan    ASA 3     general with block     intravenous induction     Anesthetic plan, all risks, benefits, and alternatives have been provided, discussed and informed consent has been obtained with: patient.

## 2020-07-28 NOTE — ANESTHESIA PROCEDURE NOTES
Peripheral Block      Patient reassessed immediately prior to procedure    Patient location during procedure: holding area  Start time: 7/28/2020 5:15 PM  Stop time: 7/28/2020 5:17 PM  Reason for block: procedure for pain, at surgeon's request, post-op pain management and Requested by Dr. Figueredo  Performed by  Anesthesiologist: Judson Samuel MD  Preanesthetic Checklist  Completed: patient identified, site marked, surgical consent, pre-op evaluation, timeout performed, IV checked, risks and benefits discussed and monitors and equipment checked  Prep:  Pt Position: supine  Prep: ChloraPrep  Patient monitoring: blood pressure monitoring, continuous pulse oximetry and EKG  Procedure  Sedation:yes    Guidance:ultrasound guided and fascial plane identified and local anesthetic infiltrated in iliaca compartment  ULTRASOUND INTERPRETATION. Using ultrasound guidance a 20 G gauge needle was placed in close proximity to the nerve, at which point, under ultrasound guidance anesthetic was injected in the area of the nerve and spread of the anesthesia was seen on ultrasound in close proximity thereto.  There were no abnormalities seen on ultrasound; a digital image was taken; and the patient tolerated the procedure with no complications. Images:still images obtained, printed/placed on chart (picture printed and placed in patients chart)    Laterality:left  Block Type:fascia iliaca compartment  Injection Technique:single-shot  Needle Type:echogenic  Needle Gauge:20 G      Medications Used: ropivacaine (NAROPIN) 0.2% injection, 30 mL  Med admintered at 7/28/2020 5:18 PM      Post Assessment  Injection Assessment: negative aspiration for heme, no paresthesia on injection and incremental injection  Patient Tolerance:comfortable throughout block  Complications:no

## 2020-07-29 NOTE — ANESTHESIA POSTPROCEDURE EVALUATION
"Patient: Hernan Su    Procedure Summary     Date:  07/28/20 Room / Location:   [facility]  /  [facility]    Anesthesia Start:  1921 Anesthesia Stop:  2034    Procedure:  HIP HEMIARTHROPLASTY (Left Hip) Diagnosis:       Closed subcapital fracture of femur, left, initial encounter (CMS/[facility])      (left FNFx)    Surgeon:  Adi Figueredo MD Provider:  Romel Waters CRNA    Anesthesia Type:  general with block ASA Status:  3          Anesthesia Type: general with block    Vitals  Vitals Value Taken Time   /87 7/28/2020  9:14 PM   Temp 98.2 °F (36.8 °C) 7/28/2020  9:10 PM   Pulse 75 7/28/2020  9:10 PM   Resp 20 7/28/2020  9:10 PM   SpO2 95 % 7/28/2020  9:10 PM   Vitals shown include unvalidated device data.        Post Anesthesia Care and Evaluation    Patient location during evaluation: PACU  Patient participation: complete - patient participated  Level of consciousness: awake and alert  Pain management: adequate  Airway patency: patent  Anesthetic complications: No anesthetic complications    Cardiovascular status: acceptable  Respiratory status: acceptable  Hydration status: acceptable    Comments: Blood pressure 130/74, pulse 83, temperature 97.9 °F (36.6 °C), temperature source Oral, resp. rate 16, height 177.8 cm (70\"), weight 73.2 kg (161 lb 6 oz), SpO2 96 %.    Pt discharged from PACU based on laura score >8      "

## 2020-07-29 NOTE — ANESTHESIA PROCEDURE NOTES
Airway  Urgency: elective    Airway not difficult    General Information and Staff    Patient location during procedure: OR  CRNA: Romel Waters CRNA    Indications and Patient Condition  Indications for airway management: airway protection    Preoxygenated: yes  Mask difficulty assessment: 1 - vent by mask    Final Airway Details  Final airway type: endotracheal airway      Successful airway: ETT  Cuffed: yes   Successful intubation technique: direct laryngoscopy  Facilitating devices/methods: intubating stylet  Endotracheal tube insertion site: oral  Blade: Rosemary  Blade size: 3  ETT size (mm): 7.5  Cormack-Lehane Classification: grade I - full view of glottis  Placement verified by: chest auscultation and capnometry   Cuff volume (mL): 8  Measured from: teeth  ETT/EBT  to teeth (cm): 21  Number of attempts at approach: 1  Assessment: lips, teeth, and gum same as pre-op and atraumatic intubation

## 2020-08-01 NOTE — OUTREACH NOTE
Prep Survey      Responses   Denominational facility patient discharged from?  [place]   Is LACE score < 7 ?  No   Eligibility  Readm Mgmt   Discharge diagnosis  Left hip mac-arthroplasty   COVID-19 Test Status  Negative   Does the patient have one of the following disease processes/diagnoses(primary or secondary)?  General Surgery   Does the patient have Home health ordered?  Yes   What is the Home health agency?   [facility]   Is there a DME ordered?  No   Comments regarding appointments  see AVS for apmts   Medication alerts for this patient  eliquis, cardizem   Prep survey completed?  Yes          Mar Garner RN

## 2020-08-04 NOTE — OUTREACH NOTE
General Surgery Week 1 Survey      Responses   [facility] patient discharged from?  [place]   Does the patient have one of the following disease processes/diagnoses(primary or secondary)?  General Surgery   Is there a successful TCM telephone encounter documented?  No   Week 1 attempt successful?  Yes   Call start time  1851   Call end time  1854   Discharge diagnosis  Left hip mac-arthroplasty   Is patient permission given to speak with other caregiver?  Yes   Person spoke with today (if not patient) and relationship  Sumanth   Meds reviewed with patient/caregiver?  Yes   Is the patient having any side effects they believe may be caused by any medication additions or changes?  No   Does the patient have all medications related to this admission filled (includes all antibiotics, pain medications, etc.)  Yes   Is the patient taking all medications as directed (includes completed medication regime)?  Yes   Does the patient have a follow up appointment scheduled with their surgeon?  Yes   Has the patient kept scheduled appointments due by today?  N/A   What is the Home health agency?   He declined   Has home health visited the patient within 72 hours of discharge?  No   Psychosocial issues?  No   Did the patient receive a copy of their discharge instructions?  Yes   Nursing interventions  Reviewed instructions with patient   What is the patient's perception of their health status since discharge?  Improving   Nursing interventions  Nurse provided patient education   Is the patient /caregiver able to teach back basic post-op care?  Continue use of incentive spirometry at least 1 week post discharge, Practice 'cough and deep breath', Do not remove steri-strips, Lifting as instructed by MD in discharge instructions   Is the patient/caregiver able to teach back signs and symptoms of incisional infection?  Increased redness, swelling or pain at the incisonal site, Increased drainage or bleeding, Incisional warmth, Pus  or odor from incision, Fever   Is the patient/caregiver able to teach back steps to recovery at home?  Set small, achievable goals for return to baseline health, Rest and rebuild strength, gradually increase activity, Make a list of questions for surgeon's appointment, Eat a well-balance diet   Is the patient/caregiver able to teach back the hierarchy of who to call/visit for symptoms/problems? PCP, Specialist, Home health nurse, Urgent Care, ED, 911  Yes   Additional teach back comments  Encouraged 5 steps more day, ambulating, encouraged f/u appt w/ PCP.   Wrap up additional comments  Improving but drinking and smoking per son.          Cassie Duong, RN

## 2021-01-01 ENCOUNTER — HOSPITAL ENCOUNTER (INPATIENT)
Facility: HOSPITAL | Age: 81
LOS: 8 days | Discharge: NURSING, REHAB OR LONG-TERM CARE | End: 2021-03-04
Attending: EMERGENCY MEDICINE | Admitting: INTERNAL MEDICINE

## 2021-01-01 ENCOUNTER — OFFICE VISIT (OUTPATIENT)
Dept: WOUND CARE | Facility: HOSPITAL | Age: 81
End: 2021-01-01

## 2021-01-01 ENCOUNTER — APPOINTMENT (OUTPATIENT)
Dept: WOUND CARE | Facility: HOSPITAL | Age: 81
End: 2021-01-01

## 2021-01-01 ENCOUNTER — APPOINTMENT (OUTPATIENT)
Dept: GENERAL RADIOLOGY | Facility: HOSPITAL | Age: 81
End: 2021-01-01

## 2021-01-01 ENCOUNTER — APPOINTMENT (OUTPATIENT)
Dept: ULTRASOUND IMAGING | Facility: HOSPITAL | Age: 81
End: 2021-01-01

## 2021-01-01 ENCOUNTER — HOSPITAL ENCOUNTER (INPATIENT)
Facility: HOSPITAL | Age: 81
LOS: 1 days | End: 2021-05-18
Attending: EMERGENCY MEDICINE | Admitting: INTERNAL MEDICINE

## 2021-01-01 ENCOUNTER — APPOINTMENT (OUTPATIENT)
Dept: CT IMAGING | Facility: HOSPITAL | Age: 81
End: 2021-01-01

## 2021-01-01 ENCOUNTER — HOSPITAL ENCOUNTER (INPATIENT)
Facility: HOSPITAL | Age: 81
LOS: 4 days | Discharge: NURSING, REHAB OR LONG-TERM CARE | End: 2021-01-29
Attending: INTERNAL MEDICINE | Admitting: INTERNAL MEDICINE

## 2021-01-01 ENCOUNTER — APPOINTMENT (OUTPATIENT)
Dept: CARDIOLOGY | Facility: HOSPITAL | Age: 81
End: 2021-01-01

## 2021-01-01 VITALS
TEMPERATURE: 97.3 F | HEIGHT: 66 IN | RESPIRATION RATE: 18 BRPM | BODY MASS INDEX: 24.2 KG/M2 | SYSTOLIC BLOOD PRESSURE: 117 MMHG | WEIGHT: 150.57 LBS | HEART RATE: 71 BPM | DIASTOLIC BLOOD PRESSURE: 79 MMHG | OXYGEN SATURATION: 97 %

## 2021-01-01 VITALS
DIASTOLIC BLOOD PRESSURE: 78 MMHG | OXYGEN SATURATION: 98 % | WEIGHT: 156.9 LBS | HEART RATE: 109 BPM | BODY MASS INDEX: 23.78 KG/M2 | RESPIRATION RATE: 24 BRPM | SYSTOLIC BLOOD PRESSURE: 102 MMHG | HEIGHT: 68 IN | TEMPERATURE: 97.6 F

## 2021-01-01 VITALS
OXYGEN SATURATION: 97 % | HEART RATE: 92 BPM | DIASTOLIC BLOOD PRESSURE: 77 MMHG | TEMPERATURE: 97.6 F | BODY MASS INDEX: 19.56 KG/M2 | SYSTOLIC BLOOD PRESSURE: 101 MMHG | RESPIRATION RATE: 16 BRPM | HEIGHT: 70 IN | WEIGHT: 136.6 LBS

## 2021-01-01 DIAGNOSIS — S60.512A ABRASION OF LEFT HAND, INITIAL ENCOUNTER: ICD-10-CM

## 2021-01-01 DIAGNOSIS — I48.91 ATRIAL FIBRILLATION WITH RAPID VENTRICULAR RESPONSE (HCC): ICD-10-CM

## 2021-01-01 DIAGNOSIS — K72.90: ICD-10-CM

## 2021-01-01 DIAGNOSIS — J90 PLEURAL EFFUSION ON RIGHT: ICD-10-CM

## 2021-01-01 DIAGNOSIS — I83.002 VENOUS STASIS ULCER OF CALF LIMITED TO BREAKDOWN OF SKIN WITH VARICOSE VEINS, UNSPECIFIED LATERALITY (HCC): ICD-10-CM

## 2021-01-01 DIAGNOSIS — S80.211A ABRASION, RIGHT KNEE, INITIAL ENCOUNTER: ICD-10-CM

## 2021-01-01 DIAGNOSIS — Z78.9 DECREASED ACTIVITIES OF DAILY LIVING (ADL): ICD-10-CM

## 2021-01-01 DIAGNOSIS — E87.20 METABOLIC ACIDOSIS: ICD-10-CM

## 2021-01-01 DIAGNOSIS — L97.201 VENOUS STASIS ULCER OF CALF LIMITED TO BREAKDOWN OF SKIN WITH VARICOSE VEINS, UNSPECIFIED LATERALITY (HCC): ICD-10-CM

## 2021-01-01 DIAGNOSIS — K76.7 HEPATORENAL SYNDROME (HCC): Primary | ICD-10-CM

## 2021-01-01 DIAGNOSIS — N17.0 ATN (ACUTE TUBULAR NECROSIS) (HCC): ICD-10-CM

## 2021-01-01 DIAGNOSIS — R60.0 LOCALIZED EDEMA: ICD-10-CM

## 2021-01-01 DIAGNOSIS — E87.29 HIGH ANION GAP METABOLIC ACIDOSIS: ICD-10-CM

## 2021-01-01 DIAGNOSIS — L97.812 NON-PRESSURE CHRONIC ULCER OF OTHER PART OF RIGHT LOWER LEG WITH FAT LAYER EXPOSED (HCC): ICD-10-CM

## 2021-01-01 DIAGNOSIS — L03.116 CELLULITIS OF BOTH LOWER EXTREMITIES: Primary | ICD-10-CM

## 2021-01-01 DIAGNOSIS — I48.91 ATRIAL FIBRILLATION WITH RVR (HCC): ICD-10-CM

## 2021-01-01 DIAGNOSIS — I87.2 VENOUS INSUFFICIENCY (CHRONIC) (PERIPHERAL): ICD-10-CM

## 2021-01-01 DIAGNOSIS — Z74.09 IMPAIRED MOBILITY: ICD-10-CM

## 2021-01-01 DIAGNOSIS — L97.822 NON-PRESSURE CHRONIC ULCER OF OTHER PART OF LEFT LOWER LEG WITH FAT LAYER EXPOSED (HCC): ICD-10-CM

## 2021-01-01 DIAGNOSIS — L03.115 CELLULITIS OF BOTH LOWER EXTREMITIES: Primary | ICD-10-CM

## 2021-01-01 DIAGNOSIS — I50.9 ACUTE ON CHRONIC CONGESTIVE HEART FAILURE, UNSPECIFIED HEART FAILURE TYPE (HCC): Primary | ICD-10-CM

## 2021-01-01 DIAGNOSIS — S60.512D ABRASION OF LEFT HAND, SUBSEQUENT ENCOUNTER: ICD-10-CM

## 2021-01-01 DIAGNOSIS — Z74.09 IMPAIRED FUNCTIONAL MOBILITY AND ACTIVITY TOLERANCE: ICD-10-CM

## 2021-01-01 DIAGNOSIS — D68.9 COAGULOPATHY (HCC): ICD-10-CM

## 2021-01-01 DIAGNOSIS — S80.211D ABRASION, RIGHT KNEE, SUBSEQUENT ENCOUNTER: ICD-10-CM

## 2021-01-01 DIAGNOSIS — R09.02 HYPOXIA: ICD-10-CM

## 2021-01-01 DIAGNOSIS — T68.XXXA HYPOTHERMIA, INITIAL ENCOUNTER: ICD-10-CM

## 2021-01-01 LAB
A-A DO2: 19.6 MMHG
ABO GROUP BLD: NORMAL
ALBUMIN SERPL-MCNC: 2.6 G/DL (ref 3.5–5.2)
ALBUMIN SERPL-MCNC: 2.9 G/DL (ref 3.5–5.2)
ALBUMIN SERPL-MCNC: 3 G/DL (ref 3.5–5.2)
ALBUMIN SERPL-MCNC: 3.1 G/DL (ref 3.5–5.2)
ALBUMIN SERPL-MCNC: 3.6 G/DL (ref 3.5–5.2)
ALBUMIN/GLOB SERPL: 0.7 G/DL
ALBUMIN/GLOB SERPL: 0.8 G/DL
ALBUMIN/GLOB SERPL: 0.9 G/DL
ALP SERPL-CCNC: 128 U/L (ref 39–117)
ALP SERPL-CCNC: 158 U/L (ref 39–117)
ALP SERPL-CCNC: 160 U/L (ref 39–117)
ALP SERPL-CCNC: 173 U/L (ref 39–117)
ALP SERPL-CCNC: 188 U/L (ref 39–117)
ALT SERPL W P-5'-P-CCNC: 21 U/L (ref 1–41)
ALT SERPL W P-5'-P-CCNC: 21 U/L (ref 1–41)
ALT SERPL W P-5'-P-CCNC: 33 U/L (ref 1–41)
ALT SERPL W P-5'-P-CCNC: 469 U/L (ref 1–41)
ALT SERPL W P-5'-P-CCNC: 853 U/L (ref 1–41)
AMMONIA BLD-SCNC: 17 UMOL/L (ref 16–60)
AMPHET+METHAMPHET UR QL: NEGATIVE
AMPHETAMINES UR QL: NEGATIVE
ANION GAP SERPL CALCULATED.3IONS-SCNC: 12 MMOL/L (ref 5–15)
ANION GAP SERPL CALCULATED.3IONS-SCNC: 14 MMOL/L (ref 5–15)
ANION GAP SERPL CALCULATED.3IONS-SCNC: 14 MMOL/L (ref 5–15)
ANION GAP SERPL CALCULATED.3IONS-SCNC: 22 MMOL/L (ref 5–15)
ANION GAP SERPL CALCULATED.3IONS-SCNC: 5 MMOL/L (ref 5–15)
ANION GAP SERPL CALCULATED.3IONS-SCNC: 6 MMOL/L (ref 5–15)
ANION GAP SERPL CALCULATED.3IONS-SCNC: 6 MMOL/L (ref 5–15)
ANION GAP SERPL CALCULATED.3IONS-SCNC: 7 MMOL/L (ref 5–15)
ANION GAP SERPL CALCULATED.3IONS-SCNC: 9 MMOL/L (ref 5–15)
APAP SERPL-MCNC: <5 MCG/ML (ref 0–30)
APTT PPP: 33.9 SECONDS (ref 24.1–35)
ARTERIAL PATENCY WRIST A: POSITIVE
AST SERPL-CCNC: 24 U/L (ref 1–40)
AST SERPL-CCNC: 2455 U/L (ref 1–40)
AST SERPL-CCNC: 26 U/L (ref 1–40)
AST SERPL-CCNC: 36 U/L (ref 1–40)
AST SERPL-CCNC: 884 U/L (ref 1–40)
ATMOSPHERIC PRESS: 749 MMHG
ATMOSPHERIC PRESS: 751 MMHG
ATMOSPHERIC PRESS: 753 MMHG
BACTERIA SPEC AEROBE CULT: ABNORMAL
BACTERIA SPEC AEROBE CULT: ABNORMAL
BACTERIA SPEC AEROBE CULT: NO GROWTH
BACTERIA SPEC AEROBE CULT: NORMAL
BACTERIA UR QL AUTO: ABNORMAL /HPF
BARBITURATES UR QL SCN: NEGATIVE
BASE EXCESS BLDA CALC-SCNC: -10.3 MMOL/L (ref 0–2)
BASE EXCESS BLDA CALC-SCNC: -3.9 MMOL/L (ref 0–2)
BASE EXCESS BLDA CALC-SCNC: 13.1 MMOL/L (ref 0–2)
BASOPHILS # BLD AUTO: 0.02 10*3/MM3 (ref 0–0.2)
BASOPHILS # BLD AUTO: 0.02 10*3/MM3 (ref 0–0.2)
BASOPHILS # BLD AUTO: 0.03 10*3/MM3 (ref 0–0.2)
BASOPHILS # BLD AUTO: 0.03 10*3/MM3 (ref 0–0.2)
BASOPHILS # BLD AUTO: 0.04 10*3/MM3 (ref 0–0.2)
BASOPHILS NFR BLD AUTO: 0.1 % (ref 0–1.5)
BASOPHILS NFR BLD AUTO: 0.2 % (ref 0–1.5)
BASOPHILS NFR BLD AUTO: 0.2 % (ref 0–1.5)
BASOPHILS NFR BLD AUTO: 0.3 % (ref 0–1.5)
BASOPHILS NFR BLD AUTO: 0.4 % (ref 0–1.5)
BDY SITE: ABNORMAL
BENZODIAZ UR QL SCN: POSITIVE
BH CV ECHO MEAS - AO MAX PG (FULL): 0.94 MMHG
BH CV ECHO MEAS - AO MAX PG: 4.5 MMHG
BH CV ECHO MEAS - AO MEAN PG (FULL): 0 MMHG
BH CV ECHO MEAS - AO MEAN PG: 2 MMHG
BH CV ECHO MEAS - AO ROOT AREA (BSA CORRECTED): 1.8
BH CV ECHO MEAS - AO ROOT AREA: 9.1 CM^2
BH CV ECHO MEAS - AO ROOT DIAM: 3.4 CM
BH CV ECHO MEAS - AO V2 MAX: 106 CM/SEC
BH CV ECHO MEAS - AO V2 MEAN: 66.6 CM/SEC
BH CV ECHO MEAS - AO V2 VTI: 16.6 CM
BH CV ECHO MEAS - AVA(I,A): 3.3 CM^2
BH CV ECHO MEAS - AVA(I,D): 3.3 CM^2
BH CV ECHO MEAS - AVA(V,A): 2.8 CM^2
BH CV ECHO MEAS - AVA(V,D): 2.8 CM^2
BH CV ECHO MEAS - BSA(HAYCOCK): 1.8 M^2
BH CV ECHO MEAS - BSA: 1.9 M^2
BH CV ECHO MEAS - BZI_BMI: 21.8 KILOGRAMS/M^2
BH CV ECHO MEAS - BZI_METRIC_HEIGHT: 177.8 CM
BH CV ECHO MEAS - BZI_METRIC_WEIGHT: 68.9 KG
BH CV ECHO MEAS - EDV(CUBED): 135 ML
BH CV ECHO MEAS - EDV(MOD-SP4): 154 ML
BH CV ECHO MEAS - EDV(TEICH): 125.5 ML
BH CV ECHO MEAS - EF(CUBED): 36.5 %
BH CV ECHO MEAS - EF(MOD-SP4): 24 %
BH CV ECHO MEAS - EF(TEICH): 29.8 %
BH CV ECHO MEAS - ESV(CUBED): 85.8 ML
BH CV ECHO MEAS - ESV(MOD-SP4): 117 ML
BH CV ECHO MEAS - ESV(TEICH): 88.2 ML
BH CV ECHO MEAS - FS: 14 %
BH CV ECHO MEAS - IVS/LVPW: 0.94
BH CV ECHO MEAS - IVSD: 1 CM
BH CV ECHO MEAS - LA DIMENSION: 4.6 CM
BH CV ECHO MEAS - LA/AO: 1.4
BH CV ECHO MEAS - LAT PEAK E' VEL: 7.5 CM/SEC
BH CV ECHO MEAS - LV DIASTOLIC VOL/BSA (35-75): 82.9 ML/M^2
BH CV ECHO MEAS - LV MASS(C)D: 196.9 GRAMS
BH CV ECHO MEAS - LV MASS(C)DI: 106 GRAMS/M^2
BH CV ECHO MEAS - LV MAX PG: 3.5 MMHG
BH CV ECHO MEAS - LV MEAN PG: 2 MMHG
BH CV ECHO MEAS - LV SYSTOLIC VOL/BSA (12-30): 63 ML/M^2
BH CV ECHO MEAS - LV V1 MAX: 94.2 CM/SEC
BH CV ECHO MEAS - LV V1 MEAN: 63.8 CM/SEC
BH CV ECHO MEAS - LV V1 VTI: 17.2 CM
BH CV ECHO MEAS - LVIDD: 5.1 CM
BH CV ECHO MEAS - LVIDS: 4.4 CM
BH CV ECHO MEAS - LVLD AP4: 8.3 CM
BH CV ECHO MEAS - LVLS AP4: 7.5 CM
BH CV ECHO MEAS - LVOT AREA (M): 3.1 CM^2
BH CV ECHO MEAS - LVOT AREA: 3.1 CM^2
BH CV ECHO MEAS - LVOT DIAM: 2 CM
BH CV ECHO MEAS - LVPWD: 1.1 CM
BH CV ECHO MEAS - MED PEAK E' VEL: 4.5 CM/SEC
BH CV ECHO MEAS - MR MAX PG: 110.3 MMHG
BH CV ECHO MEAS - MR MAX VEL: 525 CM/SEC
BH CV ECHO MEAS - MR MEAN PG: 86 MMHG
BH CV ECHO MEAS - MR MEAN VEL: 441 CM/SEC
BH CV ECHO MEAS - MR VTI: 168 CM
BH CV ECHO MEAS - MV DEC SLOPE: 476 CM/SEC^2
BH CV ECHO MEAS - MV DEC TIME: 0.2 SEC
BH CV ECHO MEAS - MV E MAX VEL: 95.6 CM/SEC
BH CV ECHO MEAS - RAP SYSTOLE: 10 MMHG
BH CV ECHO MEAS - RVSP: 57.9 MMHG
BH CV ECHO MEAS - SI(AO): 81.1 ML/M^2
BH CV ECHO MEAS - SI(CUBED): 26.5 ML/M^2
BH CV ECHO MEAS - SI(LVOT): 29.1 ML/M^2
BH CV ECHO MEAS - SI(MOD-SP4): 19.9 ML/M^2
BH CV ECHO MEAS - SI(TEICH): 20.1 ML/M^2
BH CV ECHO MEAS - SV(AO): 150.7 ML
BH CV ECHO MEAS - SV(CUBED): 49.2 ML
BH CV ECHO MEAS - SV(LVOT): 54 ML
BH CV ECHO MEAS - SV(MOD-SP4): 37 ML
BH CV ECHO MEAS - SV(TEICH): 37.3 ML
BH CV ECHO MEAS - TR MAX VEL: 346 CM/SEC
BH CV ECHO MEASUREMENTS AVERAGE E/E' RATIO: 15.93
BILIRUB SERPL-MCNC: 0.9 MG/DL (ref 0–1.2)
BILIRUB SERPL-MCNC: 2.1 MG/DL (ref 0–1.2)
BILIRUB SERPL-MCNC: 2.7 MG/DL (ref 0–1.2)
BILIRUB SERPL-MCNC: 3.1 MG/DL (ref 0–1.2)
BILIRUB SERPL-MCNC: 3.9 MG/DL (ref 0–1.2)
BILIRUB UR QL STRIP: ABNORMAL
BLD GP AB SCN SERPL QL: NEGATIVE
BODY TEMPERATURE: 37 C
BUN SERPL-MCNC: 11 MG/DL (ref 8–23)
BUN SERPL-MCNC: 13 MG/DL (ref 8–23)
BUN SERPL-MCNC: 14 MG/DL (ref 8–23)
BUN SERPL-MCNC: 14 MG/DL (ref 8–23)
BUN SERPL-MCNC: 17 MG/DL (ref 8–23)
BUN SERPL-MCNC: 18 MG/DL (ref 8–23)
BUN SERPL-MCNC: 27 MG/DL (ref 8–23)
BUN SERPL-MCNC: 28 MG/DL (ref 8–23)
BUN SERPL-MCNC: 29 MG/DL (ref 8–23)
BUN SERPL-MCNC: 30 MG/DL (ref 8–23)
BUN SERPL-MCNC: 33 MG/DL (ref 8–23)
BUN SERPL-MCNC: 51 MG/DL (ref 8–23)
BUN SERPL-MCNC: 53 MG/DL (ref 8–23)
BUN/CREAT SERPL: 16.2 (ref 7–25)
BUN/CREAT SERPL: 18.2 (ref 7–25)
BUN/CREAT SERPL: 18.3 (ref 7–25)
BUN/CREAT SERPL: 18.9 (ref 7–25)
BUN/CREAT SERPL: 19.5 (ref 7–25)
BUN/CREAT SERPL: 19.6 (ref 7–25)
BUN/CREAT SERPL: 23.8 (ref 7–25)
BUN/CREAT SERPL: 25.6 (ref 7–25)
BUN/CREAT SERPL: 27.5 (ref 7–25)
BUN/CREAT SERPL: 27.9 (ref 7–25)
BUN/CREAT SERPL: 28 (ref 7–25)
BUN/CREAT SERPL: 28.7 (ref 7–25)
BUN/CREAT SERPL: 31.4 (ref 7–25)
BUPRENORPHINE SERPL-MCNC: NEGATIVE NG/ML
CALCIUM SPEC-SCNC: 10.5 MG/DL (ref 8.6–10.5)
CALCIUM SPEC-SCNC: 11 MG/DL (ref 8.6–10.5)
CALCIUM SPEC-SCNC: 8.9 MG/DL (ref 8.6–10.5)
CALCIUM SPEC-SCNC: 9.1 MG/DL (ref 8.6–10.5)
CALCIUM SPEC-SCNC: 9.2 MG/DL (ref 8.6–10.5)
CALCIUM SPEC-SCNC: 9.3 MG/DL (ref 8.6–10.5)
CALCIUM SPEC-SCNC: 9.4 MG/DL (ref 8.6–10.5)
CALCIUM SPEC-SCNC: 9.5 MG/DL (ref 8.6–10.5)
CALCIUM SPEC-SCNC: 9.6 MG/DL (ref 8.6–10.5)
CALCIUM SPEC-SCNC: 9.8 MG/DL (ref 8.6–10.5)
CALCIUM SPEC-SCNC: 9.9 MG/DL (ref 8.6–10.5)
CANNABINOIDS SERPL QL: NEGATIVE
CHLORIDE SERPL-SCNC: 88 MMOL/L (ref 98–107)
CHLORIDE SERPL-SCNC: 92 MMOL/L (ref 98–107)
CHLORIDE SERPL-SCNC: 93 MMOL/L (ref 98–107)
CHLORIDE SERPL-SCNC: 94 MMOL/L (ref 98–107)
CHLORIDE SERPL-SCNC: 96 MMOL/L (ref 98–107)
CHLORIDE SERPL-SCNC: 96 MMOL/L (ref 98–107)
CHLORIDE SERPL-SCNC: 98 MMOL/L (ref 98–107)
CK SERPL-CCNC: 119 U/L (ref 20–200)
CLARITY UR: ABNORMAL
CO2 SERPL-SCNC: 17 MMOL/L (ref 22–29)
CO2 SERPL-SCNC: 23 MMOL/L (ref 22–29)
CO2 SERPL-SCNC: 24 MMOL/L (ref 22–29)
CO2 SERPL-SCNC: 24 MMOL/L (ref 22–29)
CO2 SERPL-SCNC: 25 MMOL/L (ref 22–29)
CO2 SERPL-SCNC: 25 MMOL/L (ref 22–29)
CO2 SERPL-SCNC: 26 MMOL/L (ref 22–29)
CO2 SERPL-SCNC: 32 MMOL/L (ref 22–29)
CO2 SERPL-SCNC: 34 MMOL/L (ref 22–29)
CO2 SERPL-SCNC: 37 MMOL/L (ref 22–29)
CO2 SERPL-SCNC: 37 MMOL/L (ref 22–29)
CO2 SERPL-SCNC: 40 MMOL/L (ref 22–29)
CO2 SERPL-SCNC: 40 MMOL/L (ref 22–29)
COCAINE UR QL: NEGATIVE
COHGB MFR BLD: 1.6 % (ref 0–5)
COLOR UR: ABNORMAL
CREAT SERPL-MCNC: 0.68 MG/DL (ref 0.76–1.27)
CREAT SERPL-MCNC: 0.71 MG/DL (ref 0.76–1.27)
CREAT SERPL-MCNC: 0.74 MG/DL (ref 0.76–1.27)
CREAT SERPL-MCNC: 0.77 MG/DL (ref 0.76–1.27)
CREAT SERPL-MCNC: 0.86 MG/DL (ref 0.76–1.27)
CREAT SERPL-MCNC: 0.87 MG/DL (ref 0.76–1.27)
CREAT SERPL-MCNC: 0.92 MG/DL (ref 0.76–1.27)
CREAT SERPL-MCNC: 1.02 MG/DL (ref 0.76–1.27)
CREAT SERPL-MCNC: 1.04 MG/DL (ref 0.76–1.27)
CREAT SERPL-MCNC: 1.15 MG/DL (ref 0.76–1.27)
CREAT SERPL-MCNC: 1.26 MG/DL (ref 0.76–1.27)
CREAT SERPL-MCNC: 1.82 MG/DL (ref 0.76–1.27)
CREAT SERPL-MCNC: 2.07 MG/DL (ref 0.76–1.27)
CREAT UR-MCNC: 107.1 MG/DL
CRP SERPL-MCNC: 17.95 MG/DL (ref 0–0.5)
D DIMER PPP FEU-MCNC: 2.33 MG/L (FEU) (ref 0–0.5)
D DIMER PPP FEU-MCNC: >20 MG/L (FEU) (ref 0–0.5)
D-LACTATE SERPL-SCNC: 1.3 MMOL/L (ref 0.5–2)
D-LACTATE SERPL-SCNC: 1.9 MMOL/L (ref 0.5–2)
D-LACTATE SERPL-SCNC: 2.8 MMOL/L (ref 0.5–2)
D-LACTATE SERPL-SCNC: 4 MMOL/L (ref 0.5–2)
D-LACTATE SERPL-SCNC: 5.7 MMOL/L (ref 0.5–2)
D-LACTATE SERPL-SCNC: 9 MMOL/L (ref 0.5–2)
DEPRECATED RDW RBC AUTO: 51.9 FL (ref 37–54)
DEPRECATED RDW RBC AUTO: 53.1 FL (ref 37–54)
DEPRECATED RDW RBC AUTO: 56.2 FL (ref 37–54)
DEPRECATED RDW RBC AUTO: 58.4 FL (ref 37–54)
DEPRECATED RDW RBC AUTO: 58.9 FL (ref 37–54)
DEPRECATED RDW RBC AUTO: 59.6 FL (ref 37–54)
DEPRECATED RDW RBC AUTO: 59.7 FL (ref 37–54)
EOSINOPHIL # BLD AUTO: 0 10*3/MM3 (ref 0–0.4)
EOSINOPHIL # BLD AUTO: 0.02 10*3/MM3 (ref 0–0.4)
EOSINOPHIL # BLD AUTO: 0.11 10*3/MM3 (ref 0–0.4)
EOSINOPHIL NFR BLD AUTO: 0 % (ref 0.3–6.2)
EOSINOPHIL NFR BLD AUTO: 0.2 % (ref 0.3–6.2)
EOSINOPHIL NFR BLD AUTO: 1.4 % (ref 0.3–6.2)
ERYTHROCYTE [DISTWIDTH] IN BLOOD BY AUTOMATED COUNT: 14.2 % (ref 12.3–15.4)
ERYTHROCYTE [DISTWIDTH] IN BLOOD BY AUTOMATED COUNT: 14.5 % (ref 12.3–15.4)
ERYTHROCYTE [DISTWIDTH] IN BLOOD BY AUTOMATED COUNT: 15.4 % (ref 12.3–15.4)
ERYTHROCYTE [DISTWIDTH] IN BLOOD BY AUTOMATED COUNT: 15.6 % (ref 12.3–15.4)
ERYTHROCYTE [DISTWIDTH] IN BLOOD BY AUTOMATED COUNT: 15.8 % (ref 12.3–15.4)
ERYTHROCYTE [DISTWIDTH] IN BLOOD BY AUTOMATED COUNT: 16 % (ref 12.3–15.4)
ERYTHROCYTE [DISTWIDTH] IN BLOOD BY AUTOMATED COUNT: 16 % (ref 12.3–15.4)
ERYTHROCYTE [SEDIMENTATION RATE] IN BLOOD: 28 MM/HR (ref 0–15)
ETHANOL UR QL: <0.01 %
GAS FLOW AIRWAY: 2 LPM
GAS FLOW AIRWAY: 40 LPM
GFR SERPL CREATININE-BSD FRML MDRD: 102 ML/MIN/1.73
GFR SERPL CREATININE-BSD FRML MDRD: 107 ML/MIN/1.73
GFR SERPL CREATININE-BSD FRML MDRD: 112 ML/MIN/1.73
GFR SERPL CREATININE-BSD FRML MDRD: 31 ML/MIN/1.73
GFR SERPL CREATININE-BSD FRML MDRD: 36 ML/MIN/1.73
GFR SERPL CREATININE-BSD FRML MDRD: 55 ML/MIN/1.73
GFR SERPL CREATININE-BSD FRML MDRD: 61 ML/MIN/1.73
GFR SERPL CREATININE-BSD FRML MDRD: 69 ML/MIN/1.73
GFR SERPL CREATININE-BSD FRML MDRD: 70 ML/MIN/1.73
GFR SERPL CREATININE-BSD FRML MDRD: 79 ML/MIN/1.73
GFR SERPL CREATININE-BSD FRML MDRD: 84 ML/MIN/1.73
GFR SERPL CREATININE-BSD FRML MDRD: 86 ML/MIN/1.73
GFR SERPL CREATININE-BSD FRML MDRD: 97 ML/MIN/1.73
GLOBULIN UR ELPH-MCNC: 2.8 GM/DL
GLOBULIN UR ELPH-MCNC: 3.4 GM/DL
GLOBULIN UR ELPH-MCNC: 3.9 GM/DL
GLOBULIN UR ELPH-MCNC: 4 GM/DL
GLOBULIN UR ELPH-MCNC: 4 GM/DL
GLUCOSE SERPL-MCNC: 103 MG/DL (ref 65–99)
GLUCOSE SERPL-MCNC: 108 MG/DL (ref 65–99)
GLUCOSE SERPL-MCNC: 108 MG/DL (ref 65–99)
GLUCOSE SERPL-MCNC: 111 MG/DL (ref 65–99)
GLUCOSE SERPL-MCNC: 127 MG/DL (ref 65–99)
GLUCOSE SERPL-MCNC: 136 MG/DL (ref 65–99)
GLUCOSE SERPL-MCNC: 137 MG/DL (ref 65–99)
GLUCOSE SERPL-MCNC: 196 MG/DL (ref 65–99)
GLUCOSE SERPL-MCNC: 58 MG/DL (ref 65–99)
GLUCOSE SERPL-MCNC: 91 MG/DL (ref 65–99)
GLUCOSE SERPL-MCNC: 93 MG/DL (ref 65–99)
GLUCOSE SERPL-MCNC: 95 MG/DL (ref 65–99)
GLUCOSE SERPL-MCNC: 97 MG/DL (ref 65–99)
GLUCOSE UR STRIP-MCNC: NEGATIVE MG/DL
GRAM STN SPEC: ABNORMAL
GRAM STN SPEC: ABNORMAL
HBA1C MFR BLD: 5.2 % (ref 4.8–5.6)
HCO3 BLDA-SCNC: 14.7 MMOL/L (ref 20–26)
HCO3 BLDA-SCNC: 21.4 MMOL/L (ref 20–26)
HCO3 BLDA-SCNC: 39.1 MMOL/L (ref 20–26)
HCT VFR BLD AUTO: 40.4 % (ref 37.5–51)
HCT VFR BLD AUTO: 40.5 % (ref 37.5–51)
HCT VFR BLD AUTO: 41.3 % (ref 37.5–51)
HCT VFR BLD AUTO: 41.4 % (ref 37.5–51)
HCT VFR BLD AUTO: 43.2 % (ref 37.5–51)
HCT VFR BLD AUTO: 43.3 % (ref 37.5–51)
HCT VFR BLD AUTO: 47.2 % (ref 37.5–51)
HCT VFR BLD CALC: 48.1 % (ref 38–51)
HGB BLD-MCNC: 13.3 G/DL (ref 13–17.7)
HGB BLD-MCNC: 13.4 G/DL (ref 13–17.7)
HGB BLD-MCNC: 13.7 G/DL (ref 13–17.7)
HGB BLD-MCNC: 13.8 G/DL (ref 13–17.7)
HGB BLD-MCNC: 15.2 G/DL (ref 13–17.7)
HGB BLD-MCNC: 15.3 G/DL (ref 13–17.7)
HGB BLD-MCNC: 15.5 G/DL (ref 13–17.7)
HGB BLDA-MCNC: 15.7 G/DL (ref 14–18)
HGB UR QL STRIP.AUTO: NEGATIVE
HOLD SPECIMEN: NORMAL
HYALINE CASTS UR QL AUTO: ABNORMAL /LPF
IMM GRANULOCYTES # BLD AUTO: 0.03 10*3/MM3 (ref 0–0.05)
IMM GRANULOCYTES # BLD AUTO: 0.06 10*3/MM3 (ref 0–0.05)
IMM GRANULOCYTES # BLD AUTO: 0.07 10*3/MM3 (ref 0–0.05)
IMM GRANULOCYTES # BLD AUTO: 0.09 10*3/MM3 (ref 0–0.05)
IMM GRANULOCYTES # BLD AUTO: 0.16 10*3/MM3 (ref 0–0.05)
IMM GRANULOCYTES NFR BLD AUTO: 0.4 % (ref 0–0.5)
IMM GRANULOCYTES NFR BLD AUTO: 0.5 % (ref 0–0.5)
IMM GRANULOCYTES NFR BLD AUTO: 0.6 % (ref 0–0.5)
IMM GRANULOCYTES NFR BLD AUTO: 0.7 % (ref 0–0.5)
IMM GRANULOCYTES NFR BLD AUTO: 1.2 % (ref 0–0.5)
INHALED O2 CONCENTRATION: 100 %
INR PPP: 4.07 (ref 0.91–1.09)
KETONES UR QL STRIP: ABNORMAL
LACTATE HOLD SPECIMEN: NORMAL
LEFT ATRIUM VOLUME INDEX: 39.5 ML/M2
LEFT ATRIUM VOLUME: 73.5 CM3
LEUKOCYTE ESTERASE UR QL STRIP.AUTO: ABNORMAL
LIPASE SERPL-CCNC: 19 U/L (ref 13–60)
LV EF 2D ECHO EST: 35 %
LYMPHOCYTES # BLD AUTO: 0.6 10*3/MM3 (ref 0.7–3.1)
LYMPHOCYTES # BLD AUTO: 0.64 10*3/MM3 (ref 0.7–3.1)
LYMPHOCYTES # BLD AUTO: 0.84 10*3/MM3 (ref 0.7–3.1)
LYMPHOCYTES # BLD AUTO: 0.86 10*3/MM3 (ref 0.7–3.1)
LYMPHOCYTES # BLD AUTO: 1.2 10*3/MM3 (ref 0.7–3.1)
LYMPHOCYTES NFR BLD AUTO: 11.3 % (ref 19.6–45.3)
LYMPHOCYTES NFR BLD AUTO: 12 % (ref 19.6–45.3)
LYMPHOCYTES NFR BLD AUTO: 4.4 % (ref 19.6–45.3)
LYMPHOCYTES NFR BLD AUTO: 4.5 % (ref 19.6–45.3)
LYMPHOCYTES NFR BLD AUTO: 6.1 % (ref 19.6–45.3)
Lab: ABNORMAL
MAGNESIUM SERPL-MCNC: 1.5 MG/DL (ref 1.6–2.4)
MAGNESIUM SERPL-MCNC: 1.7 MG/DL (ref 1.6–2.4)
MAGNESIUM SERPL-MCNC: 1.8 MG/DL (ref 1.6–2.4)
MAGNESIUM SERPL-MCNC: 1.9 MG/DL (ref 1.6–2.4)
MAGNESIUM SERPL-MCNC: 2 MG/DL (ref 1.6–2.4)
MAGNESIUM SERPL-MCNC: 2.4 MG/DL (ref 1.6–2.4)
MCH RBC QN AUTO: 32.5 PG (ref 26.6–33)
MCH RBC QN AUTO: 33.2 PG (ref 26.6–33)
MCH RBC QN AUTO: 33.3 PG (ref 26.6–33)
MCH RBC QN AUTO: 33.6 PG (ref 26.6–33)
MCH RBC QN AUTO: 33.7 PG (ref 26.6–33)
MCH RBC QN AUTO: 34.5 PG (ref 26.6–33)
MCH RBC QN AUTO: 35 PG (ref 26.6–33)
MCHC RBC AUTO-ENTMCNC: 32.4 G/DL (ref 31.5–35.7)
MCHC RBC AUTO-ENTMCNC: 32.9 G/DL (ref 31.5–35.7)
MCHC RBC AUTO-ENTMCNC: 33.1 G/DL (ref 31.5–35.7)
MCHC RBC AUTO-ENTMCNC: 33.1 G/DL (ref 31.5–35.7)
MCHC RBC AUTO-ENTMCNC: 33.4 G/DL (ref 31.5–35.7)
MCHC RBC AUTO-ENTMCNC: 35.1 G/DL (ref 31.5–35.7)
MCHC RBC AUTO-ENTMCNC: 35.9 G/DL (ref 31.5–35.7)
MCV RBC AUTO: 100.2 FL (ref 79–97)
MCV RBC AUTO: 100.7 FL (ref 79–97)
MCV RBC AUTO: 101.5 FL (ref 79–97)
MCV RBC AUTO: 102.6 FL (ref 79–97)
MCV RBC AUTO: 96.2 FL (ref 79–97)
MCV RBC AUTO: 98.8 FL (ref 79–97)
MCV RBC AUTO: 99.8 FL (ref 79–97)
METHADONE UR QL SCN: NEGATIVE
METHGB BLD QL: 0.2 % (ref 0–3)
MODALITY: ABNORMAL
MONOCYTES # BLD AUTO: 0.52 10*3/MM3 (ref 0.1–0.9)
MONOCYTES # BLD AUTO: 0.89 10*3/MM3 (ref 0.1–0.9)
MONOCYTES # BLD AUTO: 1.11 10*3/MM3 (ref 0.1–0.9)
MONOCYTES # BLD AUTO: 1.34 10*3/MM3 (ref 0.1–0.9)
MONOCYTES # BLD AUTO: 1.39 10*3/MM3 (ref 0.1–0.9)
MONOCYTES NFR BLD AUTO: 10.2 % (ref 5–12)
MONOCYTES NFR BLD AUTO: 6.8 % (ref 5–12)
MONOCYTES NFR BLD AUTO: 7.8 % (ref 5–12)
MONOCYTES NFR BLD AUTO: 8.9 % (ref 5–12)
MONOCYTES NFR BLD AUTO: 9.7 % (ref 5–12)
MRSA DNA SPEC QL NAA+PROBE: NORMAL
NEUTROPHILS NFR BLD AUTO: 11.38 10*3/MM3 (ref 1.7–7)
NEUTROPHILS NFR BLD AUTO: 11.46 10*3/MM3 (ref 1.7–7)
NEUTROPHILS NFR BLD AUTO: 12.47 10*3/MM3 (ref 1.7–7)
NEUTROPHILS NFR BLD AUTO: 6.06 10*3/MM3 (ref 1.7–7)
NEUTROPHILS NFR BLD AUTO: 7.83 10*3/MM3 (ref 1.7–7)
NEUTROPHILS NFR BLD AUTO: 78.1 % (ref 42.7–76)
NEUTROPHILS NFR BLD AUTO: 79.7 % (ref 42.7–76)
NEUTROPHILS NFR BLD AUTO: 83.4 % (ref 42.7–76)
NEUTROPHILS NFR BLD AUTO: 83.9 % (ref 42.7–76)
NEUTROPHILS NFR BLD AUTO: 87 % (ref 42.7–76)
NITRITE UR QL STRIP: POSITIVE
NOTE: ABNORMAL
NOTIFIED BY: ABNORMAL
NOTIFIED WHO: ABNORMAL
NRBC BLD AUTO-RTO: 0 /100 WBC (ref 0–0.2)
NRBC BLD AUTO-RTO: 0.4 /100 WBC (ref 0–0.2)
NT-PROBNP SERPL-MCNC: 7795 PG/ML (ref 0–1800)
NT-PROBNP SERPL-MCNC: 7810 PG/ML (ref 0–1800)
NT-PROBNP SERPL-MCNC: ABNORMAL PG/ML (ref 0–1800)
OPIATES UR QL: NEGATIVE
OSMOLALITY SERPL: 274 MOSM/KG (ref 289–308)
OSMOLALITY SERPL: 299 MOSM/KG (ref 280–301)
OSMOLALITY UR: 553 MOSM/KG (ref 601–850)
OXYCODONE UR QL SCN: NEGATIVE
OXYHGB MFR BLDV: 94.7 % (ref 94–99)
PCO2 BLDA: 29.9 MM HG (ref 35–45)
PCO2 BLDA: 39 MM HG (ref 35–45)
PCO2 BLDA: 53.9 MM HG (ref 35–45)
PCO2 TEMP ADJ BLD: 29.9 MM HG (ref 35–45)
PCO2 TEMP ADJ BLD: 39 MM HG (ref 35–45)
PCO2 TEMP ADJ BLD: 53.9 MM HG (ref 35–45)
PCP UR QL SCN: NEGATIVE
PH BLDA: 7.3 PH UNITS (ref 7.35–7.45)
PH BLDA: 7.35 PH UNITS (ref 7.35–7.45)
PH BLDA: 7.47 PH UNITS (ref 7.35–7.45)
PH UR STRIP.AUTO: <=5 [PH] (ref 5–8)
PH, TEMP CORRECTED: 7.3 PH UNITS (ref 7.35–7.45)
PH, TEMP CORRECTED: 7.35 PH UNITS (ref 7.35–7.45)
PH, TEMP CORRECTED: 7.47 PH UNITS (ref 7.35–7.45)
PHOSPHATE SERPL-MCNC: 6.7 MG/DL (ref 2.5–4.5)
PLATELET # BLD AUTO: 124 10*3/MM3 (ref 140–450)
PLATELET # BLD AUTO: 133 10*3/MM3 (ref 140–450)
PLATELET # BLD AUTO: 165 10*3/MM3 (ref 140–450)
PLATELET # BLD AUTO: 165 10*3/MM3 (ref 140–450)
PLATELET # BLD AUTO: 180 10*3/MM3 (ref 140–450)
PLATELET # BLD AUTO: 235 10*3/MM3 (ref 140–450)
PLATELET # BLD AUTO: 247 10*3/MM3 (ref 140–450)
PMV BLD AUTO: 10.2 FL (ref 6–12)
PMV BLD AUTO: 10.4 FL (ref 6–12)
PMV BLD AUTO: 11 FL (ref 6–12)
PMV BLD AUTO: 11.1 FL (ref 6–12)
PMV BLD AUTO: 11.1 FL (ref 6–12)
PMV BLD AUTO: 12.1 FL (ref 6–12)
PMV BLD AUTO: 9.8 FL (ref 6–12)
PO2 BLDA: 316 MM HG (ref 83–108)
PO2 BLDA: 59.1 MM HG (ref 83–108)
PO2 BLDA: 95 MM HG (ref 83–108)
PO2 TEMP ADJ BLD: 316 MM HG (ref 83–108)
PO2 TEMP ADJ BLD: 59.1 MM HG (ref 83–108)
PO2 TEMP ADJ BLD: 95 MM HG (ref 83–108)
POTASSIUM BLDA-SCNC: 6.3 MMOL/L (ref 3.5–5.2)
POTASSIUM SERPL-SCNC: 3.1 MMOL/L (ref 3.5–5.2)
POTASSIUM SERPL-SCNC: 3.2 MMOL/L (ref 3.5–5.2)
POTASSIUM SERPL-SCNC: 3.7 MMOL/L (ref 3.5–5.2)
POTASSIUM SERPL-SCNC: 3.7 MMOL/L (ref 3.5–5.2)
POTASSIUM SERPL-SCNC: 3.8 MMOL/L (ref 3.5–5.2)
POTASSIUM SERPL-SCNC: 3.8 MMOL/L (ref 3.5–5.2)
POTASSIUM SERPL-SCNC: 3.9 MMOL/L (ref 3.5–5.2)
POTASSIUM SERPL-SCNC: 4.1 MMOL/L (ref 3.5–5.2)
POTASSIUM SERPL-SCNC: 4.1 MMOL/L (ref 3.5–5.2)
POTASSIUM SERPL-SCNC: 4.5 MMOL/L (ref 3.5–5.2)
POTASSIUM SERPL-SCNC: 4.8 MMOL/L (ref 3.5–5.2)
POTASSIUM SERPL-SCNC: 5.9 MMOL/L (ref 3.5–5.2)
POTASSIUM SERPL-SCNC: 6.5 MMOL/L (ref 3.5–5.2)
PROCALCITONIN SERPL-MCNC: 0.37 NG/ML (ref 0–0.25)
PROCALCITONIN SERPL-MCNC: 0.4 NG/ML (ref 0–0.25)
PROPOXYPH UR QL: NEGATIVE
PROT SERPL-MCNC: 5.4 G/DL (ref 6–8.5)
PROT SERPL-MCNC: 6.4 G/DL (ref 6–8.5)
PROT SERPL-MCNC: 6.8 G/DL (ref 6–8.5)
PROT SERPL-MCNC: 7.1 G/DL (ref 6–8.5)
PROT SERPL-MCNC: 7.6 G/DL (ref 6–8.5)
PROT UR QL STRIP: ABNORMAL
PROTHROMBIN TIME: 36.8 SECONDS (ref 11.5–13.4)
QT INTERVAL: 290 MS
QT INTERVAL: 316 MS
QT INTERVAL: 322 MS
QT INTERVAL: 336 MS
QT INTERVAL: 356 MS
QTC INTERVAL: 415 MS
QTC INTERVAL: 433 MS
QTC INTERVAL: 462 MS
QTC INTERVAL: 470 MS
QTC INTERVAL: 475 MS
RBC # BLD AUTO: 4.04 10*6/MM3 (ref 4.14–5.8)
RBC # BLD AUTO: 4.08 10*6/MM3 (ref 4.14–5.8)
RBC # BLD AUTO: 4.09 10*6/MM3 (ref 4.14–5.8)
RBC # BLD AUTO: 4.1 10*6/MM3 (ref 4.14–5.8)
RBC # BLD AUTO: 4.34 10*6/MM3 (ref 4.14–5.8)
RBC # BLD AUTO: 4.49 10*6/MM3 (ref 4.14–5.8)
RBC # BLD AUTO: 4.6 10*6/MM3 (ref 4.14–5.8)
RBC # UR: ABNORMAL /HPF
REF LAB TEST METHOD: ABNORMAL
RH BLD: POSITIVE
SALICYLATES SERPL-MCNC: <0.3 MG/DL
SAO2 % BLDCOA: 91.2 % (ref 94–99)
SAO2 % BLDCOA: 96.5 % (ref 94–99)
SAO2 % BLDCOA: >100.1 % (ref 94–99)
SARS-COV-2 RDRP RESP QL NAA+PROBE: NORMAL
SARS-COV-2 RDRP RESP QL NAA+PROBE: NORMAL
SARS-COV-2 RNA PNL SPEC NAA+PROBE: NOT DETECTED
SARS-COV-2 RNA PNL SPEC NAA+PROBE: NOT DETECTED
SODIUM BLDA-SCNC: 130 MMOL/L (ref 136–145)
SODIUM SERPL-SCNC: 125 MMOL/L (ref 136–145)
SODIUM SERPL-SCNC: 125 MMOL/L (ref 136–145)
SODIUM SERPL-SCNC: 126 MMOL/L (ref 136–145)
SODIUM SERPL-SCNC: 127 MMOL/L (ref 136–145)
SODIUM SERPL-SCNC: 131 MMOL/L (ref 136–145)
SODIUM SERPL-SCNC: 131 MMOL/L (ref 136–145)
SODIUM SERPL-SCNC: 133 MMOL/L (ref 136–145)
SODIUM SERPL-SCNC: 136 MMOL/L (ref 136–145)
SODIUM SERPL-SCNC: 136 MMOL/L (ref 136–145)
SODIUM SERPL-SCNC: 137 MMOL/L (ref 136–145)
SODIUM SERPL-SCNC: 138 MMOL/L (ref 136–145)
SODIUM SERPL-SCNC: 139 MMOL/L (ref 136–145)
SODIUM SERPL-SCNC: 140 MMOL/L (ref 136–145)
SODIUM UR-SCNC: <20 MMOL/L
SP GR UR STRIP: 1.02 (ref 1–1.03)
SQUAMOUS #/AREA URNS HPF: ABNORMAL /HPF
T&S EXPIRATION DATE: NORMAL
T4 FREE SERPL-MCNC: 0.83 NG/DL (ref 0.93–1.7)
TRICYCLICS UR QL SCN: NEGATIVE
TROPONIN T SERPL-MCNC: 0.04 NG/ML (ref 0–0.03)
TROPONIN T SERPL-MCNC: <0.01 NG/ML (ref 0–0.03)
TSH SERPL DL<=0.05 MIU/L-ACNC: 1.4 UIU/ML (ref 0.27–4.2)
TSH SERPL DL<=0.05 MIU/L-ACNC: 2.83 UIU/ML (ref 0.27–4.2)
URATE SERPL-MCNC: 6.6 MG/DL (ref 3.4–7)
UROBILINOGEN UR QL STRIP: ABNORMAL
VENTILATOR MODE: ABNORMAL
VIT B12 BLD-MCNC: 1073 PG/ML (ref 211–946)
WBC # BLD AUTO: 10.02 10*3/MM3 (ref 3.4–10.8)
WBC # BLD AUTO: 13.57 10*3/MM3 (ref 3.4–10.8)
WBC # BLD AUTO: 13.75 10*3/MM3 (ref 3.4–10.8)
WBC # BLD AUTO: 14.32 10*3/MM3 (ref 3.4–10.8)
WBC # BLD AUTO: 5.9 10*3/MM3 (ref 3.4–10.8)
WBC # BLD AUTO: 7.61 10*3/MM3 (ref 3.4–10.8)
WBC # BLD AUTO: 9.13 10*3/MM3 (ref 3.4–10.8)
WBC UR QL AUTO: ABNORMAL /HPF
WHOLE BLOOD HOLD SPECIMEN: NORMAL

## 2021-01-01 PROCEDURE — 71045 X-RAY EXAM CHEST 1 VIEW: CPT

## 2021-01-01 PROCEDURE — 36415 COLL VENOUS BLD VENIPUNCTURE: CPT

## 2021-01-01 PROCEDURE — 84484 ASSAY OF TROPONIN QUANT: CPT | Performed by: EMERGENCY MEDICINE

## 2021-01-01 PROCEDURE — 82570 ASSAY OF URINE CREATININE: CPT | Performed by: INTERNAL MEDICINE

## 2021-01-01 PROCEDURE — 87040 BLOOD CULTURE FOR BACTERIA: CPT | Performed by: INTERNAL MEDICINE

## 2021-01-01 PROCEDURE — 97597 DBRDMT OPN WND 1ST 20 CM/<: CPT | Performed by: NURSE PRACTITIONER

## 2021-01-01 PROCEDURE — 25010000002 MORPHINE PER 10 MG: Performed by: INTERNAL MEDICINE

## 2021-01-01 PROCEDURE — 83735 ASSAY OF MAGNESIUM: CPT | Performed by: INTERNAL MEDICINE

## 2021-01-01 PROCEDURE — 97110 THERAPEUTIC EXERCISES: CPT

## 2021-01-01 PROCEDURE — 93010 ELECTROCARDIOGRAM REPORT: CPT | Performed by: INTERNAL MEDICINE

## 2021-01-01 PROCEDURE — 99284 EMERGENCY DEPT VISIT MOD MDM: CPT

## 2021-01-01 PROCEDURE — 25010000002 ENOXAPARIN PER 10 MG: Performed by: INTERNAL MEDICINE

## 2021-01-01 PROCEDURE — 80053 COMPREHEN METABOLIC PANEL: CPT | Performed by: EMERGENCY MEDICINE

## 2021-01-01 PROCEDURE — 83690 ASSAY OF LIPASE: CPT | Performed by: EMERGENCY MEDICINE

## 2021-01-01 PROCEDURE — 36600 WITHDRAWAL OF ARTERIAL BLOOD: CPT

## 2021-01-01 PROCEDURE — 93356 MYOCRD STRAIN IMG SPCKL TRCK: CPT

## 2021-01-01 PROCEDURE — 17250 CHEM CAUT OF GRANLTJ TISSUE: CPT | Performed by: NURSE PRACTITIONER

## 2021-01-01 PROCEDURE — 25010000002 VITAMIN K1 PER 1 MG: Performed by: EMERGENCY MEDICINE

## 2021-01-01 PROCEDURE — 84300 ASSAY OF URINE SODIUM: CPT | Performed by: INTERNAL MEDICINE

## 2021-01-01 PROCEDURE — 83930 ASSAY OF BLOOD OSMOLALITY: CPT | Performed by: EMERGENCY MEDICINE

## 2021-01-01 PROCEDURE — 83880 ASSAY OF NATRIURETIC PEPTIDE: CPT | Performed by: NURSE PRACTITIONER

## 2021-01-01 PROCEDURE — 87070 CULTURE OTHR SPECIMN AEROBIC: CPT | Performed by: NURSE PRACTITIONER

## 2021-01-01 PROCEDURE — 97116 GAIT TRAINING THERAPY: CPT

## 2021-01-01 PROCEDURE — 83735 ASSAY OF MAGNESIUM: CPT | Performed by: EMERGENCY MEDICINE

## 2021-01-01 PROCEDURE — 87635 SARS-COV-2 COVID-19 AMP PRB: CPT | Performed by: FAMILY MEDICINE

## 2021-01-01 PROCEDURE — 25010000002 THIAMINE PER 100 MG: Performed by: NURSE PRACTITIONER

## 2021-01-01 PROCEDURE — 99221 1ST HOSP IP/OBS SF/LOW 40: CPT | Performed by: PODIATRIST

## 2021-01-01 PROCEDURE — 97535 SELF CARE MNGMENT TRAINING: CPT | Performed by: OCCUPATIONAL THERAPIST

## 2021-01-01 PROCEDURE — 93306 TTE W/DOPPLER COMPLETE: CPT | Performed by: INTERNAL MEDICINE

## 2021-01-01 PROCEDURE — 99221 1ST HOSP IP/OBS SF/LOW 40: CPT | Performed by: NURSE PRACTITIONER

## 2021-01-01 PROCEDURE — 80048 BASIC METABOLIC PNL TOTAL CA: CPT | Performed by: NURSE PRACTITIONER

## 2021-01-01 PROCEDURE — 93970 EXTREMITY STUDY: CPT

## 2021-01-01 PROCEDURE — 25010000002 THIAMINE PER 100 MG: Performed by: INTERNAL MEDICINE

## 2021-01-01 PROCEDURE — 83605 ASSAY OF LACTIC ACID: CPT | Performed by: EMERGENCY MEDICINE

## 2021-01-01 PROCEDURE — 93970 EXTREMITY STUDY: CPT | Performed by: SURGERY

## 2021-01-01 PROCEDURE — 93005 ELECTROCARDIOGRAM TRACING: CPT | Performed by: EMERGENCY MEDICINE

## 2021-01-01 PROCEDURE — 83935 ASSAY OF URINE OSMOLALITY: CPT | Performed by: INTERNAL MEDICINE

## 2021-01-01 PROCEDURE — 83605 ASSAY OF LACTIC ACID: CPT | Performed by: FAMILY MEDICINE

## 2021-01-01 PROCEDURE — 25010000002 PIPERACILLIN SOD-TAZOBACTAM PER 1 G: Performed by: INTERNAL MEDICINE

## 2021-01-01 PROCEDURE — 86901 BLOOD TYPING SEROLOGIC RH(D): CPT | Performed by: EMERGENCY MEDICINE

## 2021-01-01 PROCEDURE — 99213 OFFICE O/P EST LOW 20 MIN: CPT | Performed by: NURSE PRACTITIONER

## 2021-01-01 PROCEDURE — 25010000002 AMIODARONE PER 30 MG: Performed by: EMERGENCY MEDICINE

## 2021-01-01 PROCEDURE — 72170 X-RAY EXAM OF PELVIS: CPT

## 2021-01-01 PROCEDURE — 80143 DRUG ASSAY ACETAMINOPHEN: CPT | Performed by: EMERGENCY MEDICINE

## 2021-01-01 PROCEDURE — 87040 BLOOD CULTURE FOR BACTERIA: CPT | Performed by: EMERGENCY MEDICINE

## 2021-01-01 PROCEDURE — 85610 PROTHROMBIN TIME: CPT | Performed by: EMERGENCY MEDICINE

## 2021-01-01 PROCEDURE — 80048 BASIC METABOLIC PNL TOTAL CA: CPT | Performed by: INTERNAL MEDICINE

## 2021-01-01 PROCEDURE — 71046 X-RAY EXAM CHEST 2 VIEWS: CPT

## 2021-01-01 PROCEDURE — 80053 COMPREHEN METABOLIC PANEL: CPT | Performed by: FAMILY MEDICINE

## 2021-01-01 PROCEDURE — 85025 COMPLETE CBC W/AUTO DIFF WBC: CPT | Performed by: NURSE PRACTITIONER

## 2021-01-01 PROCEDURE — 85651 RBC SED RATE NONAUTOMATED: CPT | Performed by: INTERNAL MEDICINE

## 2021-01-01 PROCEDURE — 84550 ASSAY OF BLOOD/URIC ACID: CPT | Performed by: INTERNAL MEDICINE

## 2021-01-01 PROCEDURE — 97162 PT EVAL MOD COMPLEX 30 MIN: CPT

## 2021-01-01 PROCEDURE — 93005 ELECTROCARDIOGRAM TRACING: CPT | Performed by: INTERNAL MEDICINE

## 2021-01-01 PROCEDURE — 0 IOPAMIDOL PER 1 ML: Performed by: EMERGENCY MEDICINE

## 2021-01-01 PROCEDURE — 85025 COMPLETE CBC W/AUTO DIFF WBC: CPT | Performed by: INTERNAL MEDICINE

## 2021-01-01 PROCEDURE — 93356 MYOCRD STRAIN IMG SPCKL TRCK: CPT | Performed by: INTERNAL MEDICINE

## 2021-01-01 PROCEDURE — 36415 COLL VENOUS BLD VENIPUNCTURE: CPT | Performed by: INTERNAL MEDICINE

## 2021-01-01 PROCEDURE — 93306 TTE W/DOPPLER COMPLETE: CPT

## 2021-01-01 PROCEDURE — 85027 COMPLETE CBC AUTOMATED: CPT | Performed by: NURSE PRACTITIONER

## 2021-01-01 PROCEDURE — 25010000002 MAGNESIUM SULFATE 2 GM/50ML SOLUTION: Performed by: INTERNAL MEDICINE

## 2021-01-01 PROCEDURE — 94799 UNLISTED PULMONARY SVC/PX: CPT

## 2021-01-01 PROCEDURE — 99231 SBSQ HOSP IP/OBS SF/LOW 25: CPT | Performed by: NURSE PRACTITIONER

## 2021-01-01 PROCEDURE — 74176 CT ABD & PELVIS W/O CONTRAST: CPT

## 2021-01-01 PROCEDURE — 93923 UPR/LXTR ART STDY 3+ LVLS: CPT

## 2021-01-01 PROCEDURE — 85025 COMPLETE CBC W/AUTO DIFF WBC: CPT | Performed by: EMERGENCY MEDICINE

## 2021-01-01 PROCEDURE — 82077 ASSAY SPEC XCP UR&BREATH IA: CPT | Performed by: EMERGENCY MEDICINE

## 2021-01-01 PROCEDURE — 72125 CT NECK SPINE W/O DYE: CPT

## 2021-01-01 PROCEDURE — 25010000002 FUROSEMIDE PER 20 MG: Performed by: INTERNAL MEDICINE

## 2021-01-01 PROCEDURE — 86140 C-REACTIVE PROTEIN: CPT | Performed by: INTERNAL MEDICINE

## 2021-01-01 PROCEDURE — P9612 CATHETERIZE FOR URINE SPEC: HCPCS

## 2021-01-01 PROCEDURE — 99214 OFFICE O/P EST MOD 30 MIN: CPT | Performed by: NURSE PRACTITIONER

## 2021-01-01 PROCEDURE — 84100 ASSAY OF PHOSPHORUS: CPT | Performed by: EMERGENCY MEDICINE

## 2021-01-01 PROCEDURE — G0463 HOSPITAL OUTPT CLINIC VISIT: HCPCS

## 2021-01-01 PROCEDURE — 85025 COMPLETE CBC W/AUTO DIFF WBC: CPT | Performed by: FAMILY MEDICINE

## 2021-01-01 PROCEDURE — 25010000002 VANCOMYCIN PER 500 MG: Performed by: INTERNAL MEDICINE

## 2021-01-01 PROCEDURE — 87077 CULTURE AEROBIC IDENTIFY: CPT | Performed by: NURSE PRACTITIONER

## 2021-01-01 PROCEDURE — 84145 PROCALCITONIN (PCT): CPT | Performed by: EMERGENCY MEDICINE

## 2021-01-01 PROCEDURE — 63710000001 INSULIN REGULAR HUMAN PER 5 UNITS: Performed by: EMERGENCY MEDICINE

## 2021-01-01 PROCEDURE — 83605 ASSAY OF LACTIC ACID: CPT | Performed by: INTERNAL MEDICINE

## 2021-01-01 PROCEDURE — 84439 ASSAY OF FREE THYROXINE: CPT | Performed by: INTERNAL MEDICINE

## 2021-01-01 PROCEDURE — 86850 RBC ANTIBODY SCREEN: CPT | Performed by: EMERGENCY MEDICINE

## 2021-01-01 PROCEDURE — 70450 CT HEAD/BRAIN W/O DYE: CPT

## 2021-01-01 PROCEDURE — 83605 ASSAY OF LACTIC ACID: CPT | Performed by: NURSE PRACTITIONER

## 2021-01-01 PROCEDURE — 25010000002 NALOXONE PER 1 MG

## 2021-01-01 PROCEDURE — 87641 MR-STAPH DNA AMP PROBE: CPT | Performed by: NURSE PRACTITIONER

## 2021-01-01 PROCEDURE — 87205 SMEAR GRAM STAIN: CPT | Performed by: NURSE PRACTITIONER

## 2021-01-01 PROCEDURE — 82140 ASSAY OF AMMONIA: CPT | Performed by: INTERNAL MEDICINE

## 2021-01-01 PROCEDURE — 25010000002 VANCOMYCIN 10 G RECONSTITUTED SOLUTION: Performed by: INTERNAL MEDICINE

## 2021-01-01 PROCEDURE — 84443 ASSAY THYROID STIM HORMONE: CPT | Performed by: EMERGENCY MEDICINE

## 2021-01-01 PROCEDURE — 11042 DBRDMT SUBQ TIS 1ST 20SQCM/<: CPT | Performed by: NURSE PRACTITIONER

## 2021-01-01 PROCEDURE — 11042 DBRDMT SUBQ TIS 1ST 20SQCM/<: CPT | Performed by: SURGERY

## 2021-01-01 PROCEDURE — 83880 ASSAY OF NATRIURETIC PEPTIDE: CPT | Performed by: EMERGENCY MEDICINE

## 2021-01-01 PROCEDURE — 80306 DRUG TEST PRSMV INSTRMNT: CPT | Performed by: EMERGENCY MEDICINE

## 2021-01-01 PROCEDURE — 85027 COMPLETE CBC AUTOMATED: CPT | Performed by: INTERNAL MEDICINE

## 2021-01-01 PROCEDURE — 82803 BLOOD GASES ANY COMBINATION: CPT

## 2021-01-01 PROCEDURE — 82607 VITAMIN B-12: CPT | Performed by: INTERNAL MEDICINE

## 2021-01-01 PROCEDURE — 97535 SELF CARE MNGMENT TRAINING: CPT

## 2021-01-01 PROCEDURE — 82375 ASSAY CARBOXYHB QUANT: CPT

## 2021-01-01 PROCEDURE — 82550 ASSAY OF CK (CPK): CPT | Performed by: EMERGENCY MEDICINE

## 2021-01-01 PROCEDURE — 25010000002 ALBUMIN HUMAN 25% PER 50 ML: Performed by: INTERNAL MEDICINE

## 2021-01-01 PROCEDURE — 80179 DRUG ASSAY SALICYLATE: CPT | Performed by: EMERGENCY MEDICINE

## 2021-01-01 PROCEDURE — 87635 SARS-COV-2 COVID-19 AMP PRB: CPT | Performed by: NURSE PRACTITIONER

## 2021-01-01 PROCEDURE — 71275 CT ANGIOGRAPHY CHEST: CPT

## 2021-01-01 PROCEDURE — 25010000002 FUROSEMIDE PER 20 MG: Performed by: NURSE PRACTITIONER

## 2021-01-01 PROCEDURE — 93005 ELECTROCARDIOGRAM TRACING: CPT | Performed by: FAMILY MEDICINE

## 2021-01-01 PROCEDURE — 25010000002 FUROSEMIDE PER 20 MG: Performed by: EMERGENCY MEDICINE

## 2021-01-01 PROCEDURE — 25010000003 MAGNESIUM SULFATE 4 GM/100ML SOLUTION: Performed by: EMERGENCY MEDICINE

## 2021-01-01 PROCEDURE — 85730 THROMBOPLASTIN TIME PARTIAL: CPT | Performed by: EMERGENCY MEDICINE

## 2021-01-01 PROCEDURE — 97165 OT EVAL LOW COMPLEX 30 MIN: CPT | Performed by: OCCUPATIONAL THERAPIST

## 2021-01-01 PROCEDURE — 83930 ASSAY OF BLOOD OSMOLALITY: CPT | Performed by: INTERNAL MEDICINE

## 2021-01-01 PROCEDURE — 84484 ASSAY OF TROPONIN QUANT: CPT | Performed by: INTERNAL MEDICINE

## 2021-01-01 PROCEDURE — 25010000002 NALOXONE PER 1 MG: Performed by: INTERNAL MEDICINE

## 2021-01-01 PROCEDURE — 80053 COMPREHEN METABOLIC PANEL: CPT | Performed by: INTERNAL MEDICINE

## 2021-01-01 PROCEDURE — 84145 PROCALCITONIN (PCT): CPT | Performed by: FAMILY MEDICINE

## 2021-01-01 PROCEDURE — 87040 BLOOD CULTURE FOR BACTERIA: CPT | Performed by: FAMILY MEDICINE

## 2021-01-01 PROCEDURE — 87635 SARS-COV-2 COVID-19 AMP PRB: CPT | Performed by: EMERGENCY MEDICINE

## 2021-01-01 PROCEDURE — 99285 EMERGENCY DEPT VISIT HI MDM: CPT

## 2021-01-01 PROCEDURE — 85379 FIBRIN DEGRADATION QUANT: CPT | Performed by: EMERGENCY MEDICINE

## 2021-01-01 PROCEDURE — 87086 URINE CULTURE/COLONY COUNT: CPT | Performed by: EMERGENCY MEDICINE

## 2021-01-01 PROCEDURE — P9047 ALBUMIN (HUMAN), 25%, 50ML: HCPCS | Performed by: INTERNAL MEDICINE

## 2021-01-01 PROCEDURE — 25010000002 PERFLUTREN 6.52 MG/ML SUSPENSION: Performed by: INTERNAL MEDICINE

## 2021-01-01 PROCEDURE — 84443 ASSAY THYROID STIM HORMONE: CPT | Performed by: INTERNAL MEDICINE

## 2021-01-01 PROCEDURE — 25010000002 PIPERACILLIN SOD-TAZOBACTAM PER 1 G: Performed by: EMERGENCY MEDICINE

## 2021-01-01 PROCEDURE — 83036 HEMOGLOBIN GLYCOSYLATED A1C: CPT | Performed by: INTERNAL MEDICINE

## 2021-01-01 PROCEDURE — 93923 UPR/LXTR ART STDY 3+ LVLS: CPT | Performed by: SURGERY

## 2021-01-01 PROCEDURE — 82805 BLOOD GASES W/O2 SATURATION: CPT

## 2021-01-01 PROCEDURE — 83050 HGB METHEMOGLOBIN QUAN: CPT

## 2021-01-01 PROCEDURE — 87186 SC STD MICRODIL/AGAR DIL: CPT | Performed by: NURSE PRACTITIONER

## 2021-01-01 PROCEDURE — 86900 BLOOD TYPING SEROLOGIC ABO: CPT | Performed by: EMERGENCY MEDICINE

## 2021-01-01 PROCEDURE — 97166 OT EVAL MOD COMPLEX 45 MIN: CPT | Performed by: OCCUPATIONAL THERAPIST

## 2021-01-01 PROCEDURE — 81001 URINALYSIS AUTO W/SCOPE: CPT | Performed by: EMERGENCY MEDICINE

## 2021-01-01 PROCEDURE — 25010000002 LORAZEPAM PER 2 MG: Performed by: INTERNAL MEDICINE

## 2021-01-01 RX ORDER — GABAPENTIN 100 MG/1
100 CAPSULE ORAL EVERY 8 HOURS SCHEDULED
Status: DISCONTINUED | OUTPATIENT
Start: 2021-01-01 | End: 2021-01-01 | Stop reason: HOSPADM

## 2021-01-01 RX ORDER — ERGOCALCIFEROL 1.25 MG/1
50000 CAPSULE ORAL WEEKLY
COMMUNITY

## 2021-01-01 RX ORDER — POTASSIUM CHLORIDE 750 MG/1
20 CAPSULE, EXTENDED RELEASE ORAL ONCE
Status: COMPLETED | OUTPATIENT
Start: 2021-01-01 | End: 2021-01-01

## 2021-01-01 RX ORDER — LORAZEPAM 2 MG/ML
2 INJECTION INTRAMUSCULAR
Status: DISCONTINUED | OUTPATIENT
Start: 2021-01-01 | End: 2021-01-01

## 2021-01-01 RX ORDER — HYDROCODONE BITARTRATE AND ACETAMINOPHEN 10; 325 MG/1; MG/1
1 TABLET ORAL EVERY 6 HOURS PRN
Status: DISCONTINUED | OUTPATIENT
Start: 2021-01-01 | End: 2021-01-01

## 2021-01-01 RX ORDER — LORAZEPAM 1 MG/1
1 TABLET ORAL
Status: DISCONTINUED | OUTPATIENT
Start: 2021-01-01 | End: 2021-01-01 | Stop reason: HOSPADM

## 2021-01-01 RX ORDER — METOPROLOL TARTRATE 50 MG/1
50 TABLET, FILM COATED ORAL EVERY 12 HOURS SCHEDULED
Status: DISCONTINUED | OUTPATIENT
Start: 2021-01-01 | End: 2021-01-01

## 2021-01-01 RX ORDER — ACETAZOLAMIDE 250 MG/1
250 TABLET ORAL ONCE
Status: COMPLETED | OUTPATIENT
Start: 2021-01-01 | End: 2021-01-01

## 2021-01-01 RX ORDER — METOPROLOL TARTRATE 5 MG/5ML
5 INJECTION INTRAVENOUS
Status: DISCONTINUED | OUTPATIENT
Start: 2021-01-01 | End: 2021-01-01 | Stop reason: HOSPADM

## 2021-01-01 RX ORDER — ALPRAZOLAM 0.5 MG/1
1 TABLET ORAL 3 TIMES DAILY PRN
Status: DISCONTINUED | OUTPATIENT
Start: 2021-01-01 | End: 2021-01-01 | Stop reason: HOSPADM

## 2021-01-01 RX ORDER — NALOXONE HYDROCHLORIDE 0.4 MG/ML
INJECTION, SOLUTION INTRAMUSCULAR; INTRAVENOUS; SUBCUTANEOUS
Status: COMPLETED
Start: 2021-01-01 | End: 2021-01-01

## 2021-01-01 RX ORDER — PSEUDOEPHEDRINE HCL 30 MG
100 TABLET ORAL 2 TIMES DAILY
Start: 2021-01-01

## 2021-01-01 RX ORDER — ONDANSETRON 4 MG/1
4 TABLET, FILM COATED ORAL EVERY 6 HOURS PRN
Status: DISCONTINUED | OUTPATIENT
Start: 2021-01-01 | End: 2021-01-01 | Stop reason: HOSPADM

## 2021-01-01 RX ORDER — ALLOPURINOL 300 MG/1
300 TABLET ORAL DAILY
Status: DISCONTINUED | OUTPATIENT
Start: 2021-01-01 | End: 2021-01-01 | Stop reason: HOSPADM

## 2021-01-01 RX ORDER — ACETAMINOPHEN 650 MG/1
650 SUPPOSITORY RECTAL EVERY 4 HOURS PRN
Status: DISCONTINUED | OUTPATIENT
Start: 2021-01-01 | End: 2021-01-01 | Stop reason: HOSPADM

## 2021-01-01 RX ORDER — ASPIRIN 81 MG/1
81 TABLET ORAL DAILY
Status: DISCONTINUED | OUTPATIENT
Start: 2021-01-01 | End: 2021-01-01 | Stop reason: HOSPADM

## 2021-01-01 RX ORDER — AMITRIPTYLINE HYDROCHLORIDE 25 MG/1
25 TABLET, FILM COATED ORAL NIGHTLY
Status: DISCONTINUED | OUTPATIENT
Start: 2021-01-01 | End: 2021-01-01 | Stop reason: HOSPADM

## 2021-01-01 RX ORDER — LORAZEPAM 2 MG/ML
1 INJECTION INTRAMUSCULAR
Status: DISCONTINUED | OUTPATIENT
Start: 2021-01-01 | End: 2021-01-01

## 2021-01-01 RX ORDER — FUROSEMIDE 40 MG/1
40 TABLET ORAL DAILY
Status: DISCONTINUED | OUTPATIENT
Start: 2021-01-01 | End: 2021-01-01 | Stop reason: HOSPADM

## 2021-01-01 RX ORDER — BISACODYL 10 MG
10 SUPPOSITORY, RECTAL RECTAL DAILY
Status: DISCONTINUED | OUTPATIENT
Start: 2021-01-01 | End: 2021-01-01

## 2021-01-01 RX ORDER — SODIUM CHLORIDE 0.9 % (FLUSH) 0.9 %
10 SYRINGE (ML) INJECTION AS NEEDED
Status: DISCONTINUED | OUTPATIENT
Start: 2021-01-01 | End: 2021-01-01 | Stop reason: HOSPADM

## 2021-01-01 RX ORDER — DEXTROSE MONOHYDRATE 25 G/50ML
25 INJECTION, SOLUTION INTRAVENOUS ONCE
Status: COMPLETED | OUTPATIENT
Start: 2021-01-01 | End: 2021-01-01

## 2021-01-01 RX ORDER — LORAZEPAM 0.5 MG/1
0.5 TABLET ORAL
Status: DISCONTINUED | OUTPATIENT
Start: 2021-01-01 | End: 2021-01-01

## 2021-01-01 RX ORDER — MAGNESIUM SULFATE HEPTAHYDRATE 40 MG/ML
2 INJECTION, SOLUTION INTRAVENOUS ONCE
Status: COMPLETED | OUTPATIENT
Start: 2021-01-01 | End: 2021-01-01

## 2021-01-01 RX ORDER — HYDROCODONE BITARTRATE AND ACETAMINOPHEN 5; 325 MG/1; MG/1
1 TABLET ORAL EVERY 6 HOURS PRN
Status: DISCONTINUED | OUTPATIENT
Start: 2021-01-01 | End: 2021-01-01

## 2021-01-01 RX ORDER — FUROSEMIDE 10 MG/ML
40 INJECTION INTRAMUSCULAR; INTRAVENOUS ONCE
Status: COMPLETED | OUTPATIENT
Start: 2021-01-01 | End: 2021-01-01

## 2021-01-01 RX ORDER — ALPRAZOLAM 0.5 MG/1
0.5 TABLET ORAL NIGHTLY PRN
Status: DISCONTINUED | OUTPATIENT
Start: 2021-01-01 | End: 2021-01-01

## 2021-01-01 RX ORDER — AMOXICILLIN AND CLAVULANATE POTASSIUM 875; 125 MG/1; MG/1
1 TABLET, FILM COATED ORAL EVERY 12 HOURS SCHEDULED
Status: DISCONTINUED | OUTPATIENT
Start: 2021-01-01 | End: 2021-01-01

## 2021-01-01 RX ORDER — SODIUM CHLORIDE 0.9 % (FLUSH) 0.9 %
10 SYRINGE (ML) INJECTION EVERY 12 HOURS SCHEDULED
Status: DISCONTINUED | OUTPATIENT
Start: 2021-01-01 | End: 2021-01-01

## 2021-01-01 RX ORDER — LORAZEPAM 1 MG/1
1 TABLET ORAL
Status: DISCONTINUED | OUTPATIENT
Start: 2021-01-01 | End: 2021-01-01

## 2021-01-01 RX ORDER — DOCUSATE SODIUM 100 MG/1
100 CAPSULE, LIQUID FILLED ORAL 2 TIMES DAILY
Status: DISCONTINUED | OUTPATIENT
Start: 2021-01-01 | End: 2021-01-01 | Stop reason: HOSPADM

## 2021-01-01 RX ORDER — MAGNESIUM SULFATE 1 G/100ML
4 INJECTION INTRAVENOUS ONCE
Status: DISCONTINUED | OUTPATIENT
Start: 2021-01-01 | End: 2021-01-01

## 2021-01-01 RX ORDER — SODIUM CHLORIDE 0.9 % (FLUSH) 0.9 %
10 SYRINGE (ML) INJECTION AS NEEDED
Status: DISCONTINUED | OUTPATIENT
Start: 2021-01-01 | End: 2021-01-01 | Stop reason: SDUPTHER

## 2021-01-01 RX ORDER — HYDROCODONE BITARTRATE AND ACETAMINOPHEN 5; 325 MG/1; MG/1
1 TABLET ORAL EVERY 6 HOURS PRN
COMMUNITY
End: 2021-01-01 | Stop reason: HOSPADM

## 2021-01-01 RX ORDER — MAGNESIUM SULFATE HEPTAHYDRATE 40 MG/ML
4 INJECTION, SOLUTION INTRAVENOUS ONCE
Status: COMPLETED | OUTPATIENT
Start: 2021-01-01 | End: 2021-01-01

## 2021-01-01 RX ORDER — NITROGLYCERIN 0.4 MG/1
0.4 TABLET SUBLINGUAL
Status: DISCONTINUED | OUTPATIENT
Start: 2021-01-01 | End: 2021-01-01 | Stop reason: HOSPADM

## 2021-01-01 RX ORDER — ALBUMIN (HUMAN) 12.5 G/50ML
75 SOLUTION INTRAVENOUS ONCE
Status: COMPLETED | OUTPATIENT
Start: 2021-01-01 | End: 2021-01-01

## 2021-01-01 RX ORDER — NALOXONE HYDROCHLORIDE 0.4 MG/ML
0.4 INJECTION, SOLUTION INTRAMUSCULAR; INTRAVENOUS; SUBCUTANEOUS
Status: DISCONTINUED | OUTPATIENT
Start: 2021-01-01 | End: 2021-01-01 | Stop reason: HOSPADM

## 2021-01-01 RX ORDER — LISINOPRIL 2.5 MG/1
2.5 TABLET ORAL
Start: 2021-01-01

## 2021-01-01 RX ORDER — FUROSEMIDE 10 MG/ML
60 INJECTION INTRAMUSCULAR; INTRAVENOUS ONCE
Status: COMPLETED | OUTPATIENT
Start: 2021-01-01 | End: 2021-01-01

## 2021-01-01 RX ORDER — POTASSIUM CHLORIDE 750 MG/1
40 CAPSULE, EXTENDED RELEASE ORAL
Status: COMPLETED | OUTPATIENT
Start: 2021-01-01 | End: 2021-01-01

## 2021-01-01 RX ORDER — LORAZEPAM 1 MG/1
2 TABLET ORAL
Status: DISCONTINUED | OUTPATIENT
Start: 2021-01-01 | End: 2021-01-01

## 2021-01-01 RX ORDER — ALLOPURINOL 300 MG/1
300 TABLET ORAL 2 TIMES DAILY
Status: DISCONTINUED | OUTPATIENT
Start: 2021-01-01 | End: 2021-01-01 | Stop reason: HOSPADM

## 2021-01-01 RX ORDER — LISINOPRIL 2.5 MG/1
2.5 TABLET ORAL
Status: DISCONTINUED | OUTPATIENT
Start: 2021-01-01 | End: 2021-01-01 | Stop reason: HOSPADM

## 2021-01-01 RX ORDER — LORAZEPAM 2 MG/ML
1 INJECTION INTRAMUSCULAR
Status: DISCONTINUED | OUTPATIENT
Start: 2021-01-01 | End: 2021-01-01 | Stop reason: HOSPADM

## 2021-01-01 RX ORDER — LORAZEPAM 2 MG/ML
0.5 INJECTION INTRAMUSCULAR
Status: DISCONTINUED | OUTPATIENT
Start: 2021-01-01 | End: 2021-01-01 | Stop reason: HOSPADM

## 2021-01-01 RX ORDER — AMIODARONE HYDROCHLORIDE 50 MG/ML
150 INJECTION, SOLUTION INTRAVENOUS ONCE
Status: COMPLETED | OUTPATIENT
Start: 2021-01-01 | End: 2021-01-01

## 2021-01-01 RX ORDER — GABAPENTIN 100 MG/1
100 CAPSULE ORAL 4 TIMES DAILY
Status: DISCONTINUED | OUTPATIENT
Start: 2021-01-01 | End: 2021-01-01 | Stop reason: HOSPADM

## 2021-01-01 RX ORDER — ALLOPURINOL 300 MG/1
300 TABLET ORAL DAILY
Start: 2021-01-01

## 2021-01-01 RX ORDER — LORAZEPAM 2 MG/ML
0.5 INJECTION INTRAMUSCULAR
Status: DISCONTINUED | OUTPATIENT
Start: 2021-01-01 | End: 2021-01-01

## 2021-01-01 RX ORDER — LEVOFLOXACIN 500 MG/1
500 TABLET, FILM COATED ORAL DAILY
Qty: 10 TABLET | Refills: 0
Start: 2021-01-01 | End: 2021-01-01

## 2021-01-01 RX ORDER — LORAZEPAM 0.5 MG/1
0.5 TABLET ORAL
Status: DISCONTINUED | OUTPATIENT
Start: 2021-01-01 | End: 2021-01-01 | Stop reason: HOSPADM

## 2021-01-01 RX ORDER — FUROSEMIDE 10 MG/ML
40 INJECTION INTRAMUSCULAR; INTRAVENOUS EVERY 12 HOURS SCHEDULED
Status: COMPLETED | OUTPATIENT
Start: 2021-01-01 | End: 2021-01-01

## 2021-01-01 RX ORDER — BISACODYL 10 MG
10 SUPPOSITORY, RECTAL RECTAL DAILY PRN
Status: DISCONTINUED | OUTPATIENT
Start: 2021-01-01 | End: 2021-01-01 | Stop reason: HOSPADM

## 2021-01-01 RX ORDER — SODIUM CHLORIDE 0.9 % (FLUSH) 0.9 %
10 SYRINGE (ML) INJECTION EVERY 12 HOURS SCHEDULED
Status: DISCONTINUED | OUTPATIENT
Start: 2021-01-01 | End: 2021-01-01 | Stop reason: HOSPADM

## 2021-01-01 RX ORDER — LEVOFLOXACIN 500 MG/1
500 TABLET, FILM COATED ORAL EVERY 24 HOURS
Status: DISCONTINUED | OUTPATIENT
Start: 2021-01-01 | End: 2021-01-01 | Stop reason: HOSPADM

## 2021-01-01 RX ORDER — SCOLOPAMINE TRANSDERMAL SYSTEM 1 MG/1
1 PATCH, EXTENDED RELEASE TRANSDERMAL
Status: DISCONTINUED | OUTPATIENT
Start: 2021-01-01 | End: 2021-01-01 | Stop reason: HOSPADM

## 2021-01-01 RX ORDER — LORAZEPAM 2 MG/ML
2 INJECTION INTRAMUSCULAR EVERY 4 HOURS PRN
Status: DISCONTINUED | OUTPATIENT
Start: 2021-01-01 | End: 2021-01-01 | Stop reason: HOSPADM

## 2021-01-01 RX ORDER — ONDANSETRON 2 MG/ML
4 INJECTION INTRAMUSCULAR; INTRAVENOUS EVERY 6 HOURS PRN
Status: DISCONTINUED | OUTPATIENT
Start: 2021-01-01 | End: 2021-01-01 | Stop reason: HOSPADM

## 2021-01-01 RX ORDER — ALPRAZOLAM 0.5 MG/1
0.5 TABLET ORAL NIGHTLY PRN
COMMUNITY
End: 2021-01-01 | Stop reason: HOSPADM

## 2021-01-01 RX ORDER — ACETAMINOPHEN 325 MG/1
650 TABLET ORAL EVERY 4 HOURS PRN
Status: DISCONTINUED | OUTPATIENT
Start: 2021-01-01 | End: 2021-01-01 | Stop reason: HOSPADM

## 2021-01-01 RX ORDER — BISACODYL 10 MG
10 SUPPOSITORY, RECTAL RECTAL DAILY
Status: DISCONTINUED | OUTPATIENT
Start: 2021-01-01 | End: 2021-01-01 | Stop reason: HOSPADM

## 2021-01-01 RX ORDER — GABAPENTIN 100 MG/1
100 CAPSULE ORAL 3 TIMES DAILY
Qty: 9 CAPSULE | Refills: 0 | Status: SHIPPED | OUTPATIENT
Start: 2021-01-01

## 2021-01-01 RX ORDER — GABAPENTIN 100 MG/1
100 CAPSULE ORAL 3 TIMES DAILY
Qty: 9 CAPSULE | Refills: 0 | Status: ON HOLD | OUTPATIENT
Start: 2021-01-01 | End: 2021-01-01 | Stop reason: SDUPTHER

## 2021-01-01 RX ORDER — HYDROCODONE BITARTRATE AND ACETAMINOPHEN 5; 325 MG/1; MG/1
1 TABLET ORAL EVERY 6 HOURS PRN
Qty: 12 TABLET | Refills: 0 | Status: SHIPPED | OUTPATIENT
Start: 2021-01-01 | End: 2021-01-01

## 2021-01-01 RX ORDER — FUROSEMIDE 40 MG/1
40 TABLET ORAL DAILY
Start: 2021-01-01

## 2021-01-01 RX ORDER — VANCOMYCIN HYDROCHLORIDE 1 G/200ML
1000 INJECTION, SOLUTION INTRAVENOUS EVERY 24 HOURS
Status: DISCONTINUED | OUTPATIENT
Start: 2021-01-01 | End: 2021-01-01

## 2021-01-01 RX ORDER — HYDROCODONE BITARTRATE AND ACETAMINOPHEN 5; 325 MG/1; MG/1
1 TABLET ORAL EVERY 6 HOURS PRN
Status: DISCONTINUED | OUTPATIENT
Start: 2021-01-01 | End: 2021-01-01 | Stop reason: HOSPADM

## 2021-01-01 RX ORDER — DILTIAZEM HYDROCHLORIDE 60 MG/1
60 TABLET, FILM COATED ORAL EVERY 6 HOURS SCHEDULED
Status: DISCONTINUED | OUTPATIENT
Start: 2021-01-01 | End: 2021-01-01

## 2021-01-01 RX ORDER — DOXYCYCLINE 100 MG/1
100 TABLET ORAL EVERY 12 HOURS SCHEDULED
Status: DISCONTINUED | OUTPATIENT
Start: 2021-01-01 | End: 2021-01-01

## 2021-01-01 RX ORDER — POTASSIUM CHLORIDE 750 MG/1
40 CAPSULE, EXTENDED RELEASE ORAL ONCE
Status: COMPLETED | OUTPATIENT
Start: 2021-01-01 | End: 2021-01-01

## 2021-01-01 RX ORDER — FUROSEMIDE 20 MG/1
20 TABLET ORAL DAILY
COMMUNITY
End: 2021-01-01 | Stop reason: HOSPADM

## 2021-01-01 RX ADMIN — SODIUM CHLORIDE, PRESERVATIVE FREE 10 ML: 5 INJECTION INTRAVENOUS at 08:28

## 2021-01-01 RX ADMIN — GABAPENTIN 100 MG: 100 CAPSULE ORAL at 09:04

## 2021-01-01 RX ADMIN — TAZOBACTAM SODIUM AND PIPERACILLIN SODIUM 3.38 G: 375; 3 INJECTION, SOLUTION INTRAVENOUS at 23:28

## 2021-01-01 RX ADMIN — GABAPENTIN 100 MG: 100 CAPSULE ORAL at 06:12

## 2021-01-01 RX ADMIN — ASPIRIN 81 MG: 81 TABLET, COATED ORAL at 09:05

## 2021-01-01 RX ADMIN — POTASSIUM CHLORIDE 40 MEQ: 750 CAPSULE, EXTENDED RELEASE ORAL at 13:47

## 2021-01-01 RX ADMIN — MAGNESIUM SULFATE HEPTAHYDRATE 4 G: 40 INJECTION, SOLUTION INTRAVENOUS at 12:44

## 2021-01-01 RX ADMIN — DILTIAZEM HYDROCHLORIDE 360 MG: 240 CAPSULE, EXTENDED RELEASE ORAL at 09:01

## 2021-01-01 RX ADMIN — MORPHINE SULFATE 4 MG: 4 INJECTION, SOLUTION INTRAMUSCULAR; INTRAVENOUS at 10:10

## 2021-01-01 RX ADMIN — ALLOPURINOL 300 MG: 300 TABLET ORAL at 08:27

## 2021-01-01 RX ADMIN — GABAPENTIN 100 MG: 100 CAPSULE ORAL at 12:26

## 2021-01-01 RX ADMIN — GABAPENTIN 100 MG: 100 CAPSULE ORAL at 21:18

## 2021-01-01 RX ADMIN — ENOXAPARIN SODIUM 70 MG: 80 INJECTION SUBCUTANEOUS at 05:14

## 2021-01-01 RX ADMIN — SODIUM CHLORIDE, PRESERVATIVE FREE 10 ML: 5 INJECTION INTRAVENOUS at 09:47

## 2021-01-01 RX ADMIN — SODIUM CHLORIDE, PRESERVATIVE FREE 10 ML: 5 INJECTION INTRAVENOUS at 21:28

## 2021-01-01 RX ADMIN — FUROSEMIDE 40 MG: 10 INJECTION, SOLUTION INTRAVENOUS at 11:36

## 2021-01-01 RX ADMIN — SODIUM CHLORIDE, PRESERVATIVE FREE 10 ML: 5 INJECTION INTRAVENOUS at 09:06

## 2021-01-01 RX ADMIN — SODIUM BICARBONATE 150 MEQ: 84 INJECTION INTRAVENOUS at 12:40

## 2021-01-01 RX ADMIN — SODIUM CHLORIDE, PRESERVATIVE FREE 10 ML: 5 INJECTION INTRAVENOUS at 20:07

## 2021-01-01 RX ADMIN — DOCUSATE SODIUM 100 MG: 100 CAPSULE ORAL at 08:21

## 2021-01-01 RX ADMIN — AMITRIPTYLINE HYDROCHLORIDE 25 MG: 25 TABLET, FILM COATED ORAL at 21:29

## 2021-01-01 RX ADMIN — FUROSEMIDE 40 MG: 40 TABLET ORAL at 09:57

## 2021-01-01 RX ADMIN — ENOXAPARIN SODIUM 40 MG: 40 INJECTION SUBCUTANEOUS at 09:02

## 2021-01-01 RX ADMIN — AMITRIPTYLINE HYDROCHLORIDE 25 MG: 25 TABLET, FILM COATED ORAL at 21:05

## 2021-01-01 RX ADMIN — FUROSEMIDE 40 MG: 40 TABLET ORAL at 09:46

## 2021-01-01 RX ADMIN — ENOXAPARIN SODIUM 60 MG: 60 INJECTION SUBCUTANEOUS at 17:46

## 2021-01-01 RX ADMIN — DILTIAZEM HYDROCHLORIDE 360 MG: 240 CAPSULE, EXTENDED RELEASE ORAL at 09:46

## 2021-01-01 RX ADMIN — DILTIAZEM HYDROCHLORIDE 360 MG: 240 CAPSULE, EXTENDED RELEASE ORAL at 13:41

## 2021-01-01 RX ADMIN — SODIUM CHLORIDE, PRESERVATIVE FREE 10 ML: 5 INJECTION INTRAVENOUS at 21:36

## 2021-01-01 RX ADMIN — ENOXAPARIN SODIUM 70 MG: 80 INJECTION SUBCUTANEOUS at 17:49

## 2021-01-01 RX ADMIN — ENOXAPARIN SODIUM 40 MG: 40 INJECTION SUBCUTANEOUS at 09:10

## 2021-01-01 RX ADMIN — SODIUM CHLORIDE, PRESERVATIVE FREE 10 ML: 5 INJECTION INTRAVENOUS at 21:18

## 2021-01-01 RX ADMIN — SODIUM CHLORIDE 2151 ML: 9 INJECTION, SOLUTION INTRAVENOUS at 23:06

## 2021-01-01 RX ADMIN — ENOXAPARIN SODIUM 70 MG: 80 INJECTION SUBCUTANEOUS at 17:46

## 2021-01-01 RX ADMIN — GABAPENTIN 100 MG: 100 CAPSULE ORAL at 20:12

## 2021-01-01 RX ADMIN — ALLOPURINOL 300 MG: 300 TABLET ORAL at 08:21

## 2021-01-01 RX ADMIN — TAZOBACTAM SODIUM AND PIPERACILLIN SODIUM 3.38 G: 375; 3 INJECTION, SOLUTION INTRAVENOUS at 00:12

## 2021-01-01 RX ADMIN — ASPIRIN 81 MG: 81 TABLET, COATED ORAL at 09:10

## 2021-01-01 RX ADMIN — DILTIAZEM HYDROCHLORIDE 360 MG: 240 CAPSULE, EXTENDED RELEASE ORAL at 09:05

## 2021-01-01 RX ADMIN — GABAPENTIN 100 MG: 100 CAPSULE ORAL at 14:01

## 2021-01-01 RX ADMIN — SODIUM CHLORIDE 500 ML: 9 INJECTION, SOLUTION INTRAVENOUS at 00:52

## 2021-01-01 RX ADMIN — GABAPENTIN 100 MG: 100 CAPSULE ORAL at 21:36

## 2021-01-01 RX ADMIN — ALLOPURINOL 300 MG: 300 TABLET ORAL at 09:02

## 2021-01-01 RX ADMIN — BACITRACIN: 500 OINTMENT TOPICAL at 09:10

## 2021-01-01 RX ADMIN — SODIUM CHLORIDE, PRESERVATIVE FREE 10 ML: 5 INJECTION INTRAVENOUS at 21:12

## 2021-01-01 RX ADMIN — DILTIAZEM HYDROCHLORIDE 360 MG: 240 CAPSULE, EXTENDED RELEASE ORAL at 08:27

## 2021-01-01 RX ADMIN — GABAPENTIN 100 MG: 100 CAPSULE ORAL at 11:39

## 2021-01-01 RX ADMIN — ASPIRIN 81 MG: 81 TABLET, COATED ORAL at 08:27

## 2021-01-01 RX ADMIN — GABAPENTIN 100 MG: 100 CAPSULE ORAL at 09:10

## 2021-01-01 RX ADMIN — ENOXAPARIN SODIUM 70 MG: 80 INJECTION SUBCUTANEOUS at 06:38

## 2021-01-01 RX ADMIN — LEVOFLOXACIN 500 MG: 500 TABLET, FILM COATED ORAL at 15:37

## 2021-01-01 RX ADMIN — ENOXAPARIN SODIUM 60 MG: 60 INJECTION SUBCUTANEOUS at 06:12

## 2021-01-01 RX ADMIN — VANCOMYCIN HYDROCHLORIDE 1500 MG: 10 INJECTION, POWDER, LYOPHILIZED, FOR SOLUTION INTRAVENOUS at 00:12

## 2021-01-01 RX ADMIN — DILTIAZEM HYDROCHLORIDE 360 MG: 240 CAPSULE, EXTENDED RELEASE ORAL at 08:54

## 2021-01-01 RX ADMIN — SODIUM CHLORIDE, PRESERVATIVE FREE 10 ML: 5 INJECTION INTRAVENOUS at 09:04

## 2021-01-01 RX ADMIN — GABAPENTIN 100 MG: 100 CAPSULE ORAL at 06:23

## 2021-01-01 RX ADMIN — GABAPENTIN 100 MG: 100 CAPSULE ORAL at 20:34

## 2021-01-01 RX ADMIN — HYDROCODONE BITARTRATE AND ACETAMINOPHEN 1 TABLET: 10; 325 TABLET ORAL at 21:17

## 2021-01-01 RX ADMIN — GABAPENTIN 100 MG: 100 CAPSULE ORAL at 14:37

## 2021-01-01 RX ADMIN — DILTIAZEM HYDROCHLORIDE 60 MG: 60 TABLET, FILM COATED ORAL at 23:45

## 2021-01-01 RX ADMIN — GABAPENTIN 100 MG: 100 CAPSULE ORAL at 05:14

## 2021-01-01 RX ADMIN — BACITRACIN: 500 OINTMENT TOPICAL at 08:28

## 2021-01-01 RX ADMIN — ALLOPURINOL 300 MG: 300 TABLET ORAL at 09:12

## 2021-01-01 RX ADMIN — THIAMINE HYDROCHLORIDE 100 ML/HR: 100 INJECTION, SOLUTION INTRAMUSCULAR; INTRAVENOUS at 09:02

## 2021-01-01 RX ADMIN — SODIUM CHLORIDE, PRESERVATIVE FREE 10 ML: 5 INJECTION INTRAVENOUS at 09:58

## 2021-01-01 RX ADMIN — TAZOBACTAM SODIUM AND PIPERACILLIN SODIUM 3.38 G: 375; 3 INJECTION, SOLUTION INTRAVENOUS at 08:27

## 2021-01-01 RX ADMIN — TAZOBACTAM SODIUM AND PIPERACILLIN SODIUM 3.38 G: 375; 3 INJECTION, SOLUTION INTRAVENOUS at 15:14

## 2021-01-01 RX ADMIN — MAGNESIUM SULFATE HEPTAHYDRATE 2 G: 40 INJECTION, SOLUTION INTRAVENOUS at 14:01

## 2021-01-01 RX ADMIN — DILTIAZEM HYDROCHLORIDE 360 MG: 240 CAPSULE, EXTENDED RELEASE ORAL at 09:12

## 2021-01-01 RX ADMIN — THIAMINE HYDROCHLORIDE 200 MG: 100 INJECTION, SOLUTION INTRAMUSCULAR; INTRAVENOUS at 11:49

## 2021-01-01 RX ADMIN — FUROSEMIDE 40 MG: 10 INJECTION, SOLUTION INTRAMUSCULAR; INTRAVENOUS at 17:46

## 2021-01-01 RX ADMIN — DILTIAZEM HYDROCHLORIDE 5 MG/HR: 5 INJECTION INTRAVENOUS at 11:20

## 2021-01-01 RX ADMIN — ENOXAPARIN SODIUM 40 MG: 40 INJECTION SUBCUTANEOUS at 08:27

## 2021-01-01 RX ADMIN — ENOXAPARIN SODIUM 70 MG: 80 INJECTION SUBCUTANEOUS at 05:23

## 2021-01-01 RX ADMIN — DILTIAZEM HYDROCHLORIDE 360 MG: 240 CAPSULE, EXTENDED RELEASE ORAL at 08:36

## 2021-01-01 RX ADMIN — ASPIRIN 81 MG: 81 TABLET, FILM COATED ORAL at 08:54

## 2021-01-01 RX ADMIN — DOCUSATE SODIUM 100 MG: 100 CAPSULE ORAL at 11:31

## 2021-01-01 RX ADMIN — SODIUM CHLORIDE, PRESERVATIVE FREE 10 ML: 5 INJECTION INTRAVENOUS at 21:05

## 2021-01-01 RX ADMIN — AMITRIPTYLINE HYDROCHLORIDE 25 MG: 25 TABLET, FILM COATED ORAL at 20:07

## 2021-01-01 RX ADMIN — GABAPENTIN 100 MG: 100 CAPSULE ORAL at 17:28

## 2021-01-01 RX ADMIN — ENOXAPARIN SODIUM 60 MG: 60 INJECTION SUBCUTANEOUS at 05:59

## 2021-01-01 RX ADMIN — TAZOBACTAM SODIUM AND PIPERACILLIN SODIUM 3.38 G: 375; 3 INJECTION, SOLUTION INTRAVENOUS at 09:10

## 2021-01-01 RX ADMIN — ENOXAPARIN SODIUM 70 MG: 80 INJECTION SUBCUTANEOUS at 06:00

## 2021-01-01 RX ADMIN — ASPIRIN 81 MG: 81 TABLET, FILM COATED ORAL at 09:45

## 2021-01-01 RX ADMIN — GABAPENTIN 100 MG: 100 CAPSULE ORAL at 08:28

## 2021-01-01 RX ADMIN — AMITRIPTYLINE HYDROCHLORIDE 25 MG: 25 TABLET, FILM COATED ORAL at 21:17

## 2021-01-01 RX ADMIN — GABAPENTIN 100 MG: 100 CAPSULE ORAL at 17:51

## 2021-01-01 RX ADMIN — ALLOPURINOL 300 MG: 300 TABLET ORAL at 20:07

## 2021-01-01 RX ADMIN — FUROSEMIDE 40 MG: 10 INJECTION, SOLUTION INTRAVENOUS at 14:06

## 2021-01-01 RX ADMIN — TAZOBACTAM SODIUM AND PIPERACILLIN SODIUM 3.38 G: 375; 3 INJECTION, SOLUTION INTRAVENOUS at 16:36

## 2021-01-01 RX ADMIN — GABAPENTIN 100 MG: 100 CAPSULE ORAL at 11:49

## 2021-01-01 RX ADMIN — TAZOBACTAM SODIUM AND PIPERACILLIN SODIUM 3.38 G: 375; 3 INJECTION, SOLUTION INTRAVENOUS at 06:40

## 2021-01-01 RX ADMIN — ENOXAPARIN SODIUM 70 MG: 80 INJECTION SUBCUTANEOUS at 06:20

## 2021-01-01 RX ADMIN — LISINOPRIL 2.5 MG: 2.5 TABLET ORAL at 08:21

## 2021-01-01 RX ADMIN — SCOPALAMINE 1 PATCH: 1 PATCH, EXTENDED RELEASE TRANSDERMAL at 17:05

## 2021-01-01 RX ADMIN — PIPERACILLIN SODIUM AND TAZOBACTAM SODIUM 3.38 G: 3; .375 INJECTION, POWDER, LYOPHILIZED, FOR SOLUTION INTRAVENOUS at 23:07

## 2021-01-01 RX ADMIN — ASPIRIN 81 MG: 81 TABLET, FILM COATED ORAL at 09:46

## 2021-01-01 RX ADMIN — ENOXAPARIN SODIUM 70 MG: 80 INJECTION SUBCUTANEOUS at 06:05

## 2021-01-01 RX ADMIN — ALLOPURINOL 300 MG: 300 TABLET ORAL at 21:17

## 2021-01-01 RX ADMIN — GABAPENTIN 100 MG: 100 CAPSULE ORAL at 06:38

## 2021-01-01 RX ADMIN — SODIUM CHLORIDE, PRESERVATIVE FREE 10 ML: 5 INJECTION INTRAVENOUS at 09:12

## 2021-01-01 RX ADMIN — ENOXAPARIN SODIUM 70 MG: 80 INJECTION SUBCUTANEOUS at 17:44

## 2021-01-01 RX ADMIN — FUROSEMIDE 40 MG: 10 INJECTION, SOLUTION INTRAMUSCULAR; INTRAVENOUS at 06:00

## 2021-01-01 RX ADMIN — ENOXAPARIN SODIUM 40 MG: 40 INJECTION SUBCUTANEOUS at 09:04

## 2021-01-01 RX ADMIN — NALOXONE HYDROCHLORIDE 0.4 MG: 0.4 INJECTION, SOLUTION INTRAMUSCULAR; INTRAVENOUS; SUBCUTANEOUS at 00:38

## 2021-01-01 RX ADMIN — ALLOPURINOL 300 MG: 300 TABLET ORAL at 20:34

## 2021-01-01 RX ADMIN — ALLOPURINOL 300 MG: 300 TABLET ORAL at 08:36

## 2021-01-01 RX ADMIN — GABAPENTIN 100 MG: 100 CAPSULE ORAL at 21:09

## 2021-01-01 RX ADMIN — HYDROCODONE BITARTRATE AND ACETAMINOPHEN 1 TABLET: 10; 325 TABLET ORAL at 09:00

## 2021-01-01 RX ADMIN — NALOXONE HYDROCHLORIDE 0.4 MG: 0.4 INJECTION, SOLUTION INTRAMUSCULAR; INTRAVENOUS; SUBCUTANEOUS at 00:53

## 2021-01-01 RX ADMIN — GABAPENTIN 100 MG: 100 CAPSULE ORAL at 05:23

## 2021-01-01 RX ADMIN — GABAPENTIN 100 MG: 100 CAPSULE ORAL at 09:00

## 2021-01-01 RX ADMIN — SODIUM CHLORIDE, POTASSIUM CHLORIDE, SODIUM LACTATE AND CALCIUM CHLORIDE 2082 ML: 600; 310; 30; 20 INJECTION, SOLUTION INTRAVENOUS at 11:42

## 2021-01-01 RX ADMIN — DILTIAZEM HYDROCHLORIDE 60 MG: 60 TABLET, FILM COATED ORAL at 05:13

## 2021-01-01 RX ADMIN — ACETAZOLAMIDE 250 MG: 250 TABLET ORAL at 10:42

## 2021-01-01 RX ADMIN — PHYTONADIONE 5 MG: 10 INJECTION, EMULSION INTRAMUSCULAR; INTRAVENOUS; SUBCUTANEOUS at 11:54

## 2021-01-01 RX ADMIN — AMIODARONE HYDROCHLORIDE 150 MG: 50 INJECTION, SOLUTION INTRAVENOUS at 11:50

## 2021-01-01 RX ADMIN — ALLOPURINOL 300 MG: 300 TABLET ORAL at 09:49

## 2021-01-01 RX ADMIN — GABAPENTIN 100 MG: 100 CAPSULE ORAL at 21:17

## 2021-01-01 RX ADMIN — ASPIRIN 81 MG: 81 TABLET, FILM COATED ORAL at 08:36

## 2021-01-01 RX ADMIN — ALLOPURINOL 300 MG: 300 TABLET ORAL at 08:54

## 2021-01-01 RX ADMIN — BACITRACIN: 500 OINTMENT TOPICAL at 09:07

## 2021-01-01 RX ADMIN — TAZOBACTAM SODIUM AND PIPERACILLIN SODIUM 3.38 G: 375; 3 INJECTION, SOLUTION INTRAVENOUS at 14:58

## 2021-01-01 RX ADMIN — GABAPENTIN 100 MG: 100 CAPSULE ORAL at 06:05

## 2021-01-01 RX ADMIN — GABAPENTIN 100 MG: 100 CAPSULE ORAL at 11:47

## 2021-01-01 RX ADMIN — DILTIAZEM HYDROCHLORIDE 360 MG: 240 CAPSULE, EXTENDED RELEASE ORAL at 08:22

## 2021-01-01 RX ADMIN — GABAPENTIN 100 MG: 100 CAPSULE ORAL at 14:24

## 2021-01-01 RX ADMIN — ENOXAPARIN SODIUM 60 MG: 60 INJECTION SUBCUTANEOUS at 18:33

## 2021-01-01 RX ADMIN — DEXTROSE MONOHYDRATE 25 G: 500 INJECTION PARENTERAL at 11:46

## 2021-01-01 RX ADMIN — SODIUM CHLORIDE 500 ML: 9 INJECTION, SOLUTION INTRAVENOUS at 00:26

## 2021-01-01 RX ADMIN — VANCOMYCIN HYDROCHLORIDE 1000 MG: 1 INJECTION, SOLUTION INTRAVENOUS at 00:12

## 2021-01-01 RX ADMIN — GABAPENTIN 100 MG: 100 CAPSULE ORAL at 21:12

## 2021-01-01 RX ADMIN — ASPIRIN 81 MG: 81 TABLET, FILM COATED ORAL at 09:39

## 2021-01-01 RX ADMIN — PERFLUTREN 8.48 MG: 6.52 INJECTION, SUSPENSION INTRAVENOUS at 13:17

## 2021-01-01 RX ADMIN — BISACODYL 10 MG: 10 SUPPOSITORY RECTAL at 18:16

## 2021-01-01 RX ADMIN — AMITRIPTYLINE HYDROCHLORIDE 25 MG: 25 TABLET, FILM COATED ORAL at 21:12

## 2021-01-01 RX ADMIN — HYDROCODONE BITARTRATE AND ACETAMINOPHEN 1 TABLET: 5; 325 TABLET ORAL at 15:38

## 2021-01-01 RX ADMIN — TAZOBACTAM SODIUM AND PIPERACILLIN SODIUM 3.38 G: 375; 3 INJECTION, SOLUTION INTRAVENOUS at 23:13

## 2021-01-01 RX ADMIN — GABAPENTIN 100 MG: 100 CAPSULE ORAL at 17:46

## 2021-01-01 RX ADMIN — SODIUM BICARBONATE 50 MEQ: 84 INJECTION INTRAVENOUS at 11:46

## 2021-01-01 RX ADMIN — GABAPENTIN 100 MG: 100 CAPSULE ORAL at 18:16

## 2021-01-01 RX ADMIN — SODIUM CHLORIDE, PRESERVATIVE FREE 10 ML: 5 INJECTION INTRAVENOUS at 08:37

## 2021-01-01 RX ADMIN — AMITRIPTYLINE HYDROCHLORIDE 25 MG: 25 TABLET, FILM COATED ORAL at 21:09

## 2021-01-01 RX ADMIN — IOPAMIDOL 100 ML: 755 INJECTION, SOLUTION INTRAVENOUS at 13:34

## 2021-01-01 RX ADMIN — DILTIAZEM HYDROCHLORIDE 360 MG: 240 CAPSULE, EXTENDED RELEASE ORAL at 09:10

## 2021-01-01 RX ADMIN — BACITRACIN: 500 OINTMENT TOPICAL at 20:07

## 2021-01-01 RX ADMIN — GABAPENTIN 100 MG: 100 CAPSULE ORAL at 21:05

## 2021-01-01 RX ADMIN — ALLOPURINOL 300 MG: 300 TABLET ORAL at 09:57

## 2021-01-01 RX ADMIN — GABAPENTIN 100 MG: 100 CAPSULE ORAL at 06:01

## 2021-01-01 RX ADMIN — BACITRACIN: 500 OINTMENT TOPICAL at 00:14

## 2021-01-01 RX ADMIN — SODIUM CHLORIDE, PRESERVATIVE FREE 10 ML: 5 INJECTION INTRAVENOUS at 08:54

## 2021-01-01 RX ADMIN — SODIUM CHLORIDE, PRESERVATIVE FREE 10 ML: 5 INJECTION INTRAVENOUS at 20:34

## 2021-01-01 RX ADMIN — ALLOPURINOL 300 MG: 300 TABLET ORAL at 09:45

## 2021-01-01 RX ADMIN — POTASSIUM CHLORIDE 40 MEQ: 750 CAPSULE, EXTENDED RELEASE ORAL at 10:42

## 2021-01-01 RX ADMIN — DILTIAZEM HYDROCHLORIDE 60 MG: 60 TABLET, FILM COATED ORAL at 17:51

## 2021-01-01 RX ADMIN — SODIUM CHLORIDE, PRESERVATIVE FREE 10 ML: 5 INJECTION INTRAVENOUS at 09:40

## 2021-01-01 RX ADMIN — FUROSEMIDE 40 MG: 10 INJECTION, SOLUTION INTRAMUSCULAR; INTRAVENOUS at 05:14

## 2021-01-01 RX ADMIN — LISINOPRIL 2.5 MG: 2.5 TABLET ORAL at 14:28

## 2021-01-01 RX ADMIN — DOCUSATE SODIUM 100 MG: 100 CAPSULE ORAL at 21:36

## 2021-01-01 RX ADMIN — GABAPENTIN 100 MG: 100 CAPSULE ORAL at 13:47

## 2021-01-01 RX ADMIN — SODIUM CHLORIDE, PRESERVATIVE FREE 10 ML: 5 INJECTION INTRAVENOUS at 20:13

## 2021-01-01 RX ADMIN — PIPERACILLIN AND TAZOBACTAM 4.5 G: 4; .5 INJECTION, POWDER, LYOPHILIZED, FOR SOLUTION INTRAVENOUS; PARENTERAL at 11:43

## 2021-01-01 RX ADMIN — TAZOBACTAM SODIUM AND PIPERACILLIN SODIUM 3.38 G: 375; 3 INJECTION, SOLUTION INTRAVENOUS at 09:04

## 2021-01-01 RX ADMIN — MORPHINE SULFATE 4 MG: 4 INJECTION, SOLUTION INTRAMUSCULAR; INTRAVENOUS at 05:35

## 2021-01-01 RX ADMIN — ASPIRIN 81 MG: 81 TABLET, FILM COATED ORAL at 09:57

## 2021-01-01 RX ADMIN — AMITRIPTYLINE HYDROCHLORIDE 25 MG: 25 TABLET, FILM COATED ORAL at 20:34

## 2021-01-01 RX ADMIN — ACETAZOLAMIDE 250 MG: 250 TABLET ORAL at 17:46

## 2021-01-01 RX ADMIN — GABAPENTIN 100 MG: 100 CAPSULE ORAL at 20:07

## 2021-01-01 RX ADMIN — HYDROCODONE BITARTRATE AND ACETAMINOPHEN 1 TABLET: 5; 325 TABLET ORAL at 08:31

## 2021-01-01 RX ADMIN — SODIUM CHLORIDE, PRESERVATIVE FREE 10 ML: 5 INJECTION INTRAVENOUS at 08:22

## 2021-01-01 RX ADMIN — GABAPENTIN 100 MG: 100 CAPSULE ORAL at 13:12

## 2021-01-01 RX ADMIN — BACITRACIN: 500 OINTMENT TOPICAL at 20:34

## 2021-01-01 RX ADMIN — ASPIRIN 81 MG: 81 TABLET, FILM COATED ORAL at 08:21

## 2021-01-01 RX ADMIN — ALBUMIN HUMAN 75 G: 0.25 SOLUTION INTRAVENOUS at 15:25

## 2021-01-01 RX ADMIN — SODIUM CHLORIDE, PRESERVATIVE FREE 10 ML: 5 INJECTION INTRAVENOUS at 21:09

## 2021-01-01 RX ADMIN — ENOXAPARIN SODIUM 70 MG: 80 INJECTION SUBCUTANEOUS at 17:50

## 2021-01-01 RX ADMIN — ALLOPURINOL 300 MG: 300 TABLET ORAL at 09:39

## 2021-01-01 RX ADMIN — DILTIAZEM HYDROCHLORIDE 360 MG: 240 CAPSULE, EXTENDED RELEASE ORAL at 09:58

## 2021-01-01 RX ADMIN — POTASSIUM CHLORIDE 40 MEQ: 750 CAPSULE, EXTENDED RELEASE ORAL at 15:18

## 2021-01-01 RX ADMIN — ASPIRIN 81 MG: 81 TABLET, FILM COATED ORAL at 09:12

## 2021-01-01 RX ADMIN — LORAZEPAM 2 MG: 2 INJECTION INTRAMUSCULAR; INTRAVENOUS at 15:17

## 2021-01-01 RX ADMIN — ALLOPURINOL 300 MG: 300 TABLET ORAL at 09:06

## 2021-01-01 RX ADMIN — GABAPENTIN 100 MG: 100 CAPSULE ORAL at 05:47

## 2021-01-01 RX ADMIN — GABAPENTIN 100 MG: 100 CAPSULE ORAL at 21:29

## 2021-01-01 RX ADMIN — GABAPENTIN 100 MG: 100 CAPSULE ORAL at 13:32

## 2021-01-01 RX ADMIN — INSULIN HUMAN 6 UNITS: 100 INJECTION, SOLUTION PARENTERAL at 11:45

## 2021-01-01 RX ADMIN — ENOXAPARIN SODIUM 70 MG: 80 INJECTION SUBCUTANEOUS at 17:17

## 2021-01-01 RX ADMIN — THIAMINE HYDROCHLORIDE 300 MG: 100 INJECTION, SOLUTION INTRAMUSCULAR; INTRAVENOUS at 15:23

## 2021-01-01 RX ADMIN — GABAPENTIN 100 MG: 100 CAPSULE ORAL at 13:10

## 2021-01-01 RX ADMIN — POTASSIUM CHLORIDE 20 MEQ: 750 CAPSULE, EXTENDED RELEASE ORAL at 17:46

## 2021-01-01 RX ADMIN — ASPIRIN 81 MG: 81 TABLET, COATED ORAL at 09:00

## 2021-01-01 RX ADMIN — FUROSEMIDE 40 MG: 40 TABLET ORAL at 08:21

## 2021-01-01 RX ADMIN — GABAPENTIN 100 MG: 100 CAPSULE ORAL at 15:19

## 2021-01-01 RX ADMIN — VANCOMYCIN HYDROCHLORIDE 1000 MG: 1 INJECTION, SOLUTION INTRAVENOUS at 22:09

## 2021-01-01 RX ADMIN — AMITRIPTYLINE HYDROCHLORIDE 25 MG: 25 TABLET, FILM COATED ORAL at 20:12

## 2021-01-01 RX ADMIN — ALLOPURINOL 300 MG: 300 TABLET ORAL at 09:10

## 2021-01-01 RX ADMIN — SODIUM CHLORIDE, PRESERVATIVE FREE 10 ML: 5 INJECTION INTRAVENOUS at 09:46

## 2021-01-01 RX ADMIN — AMITRIPTYLINE HYDROCHLORIDE 25 MG: 25 TABLET, FILM COATED ORAL at 21:18

## 2021-01-01 RX ADMIN — AMITRIPTYLINE HYDROCHLORIDE 25 MG: 25 TABLET, FILM COATED ORAL at 21:36

## 2021-01-01 RX ADMIN — FUROSEMIDE 40 MG: 40 TABLET ORAL at 08:36

## 2021-01-01 RX ADMIN — METOPROLOL TARTRATE 50 MG: 50 TABLET, FILM COATED ORAL at 05:17

## 2021-01-01 RX ADMIN — FUROSEMIDE 40 MG: 10 INJECTION, SOLUTION INTRAVENOUS at 10:42

## 2021-01-01 RX ADMIN — FUROSEMIDE 60 MG: 10 INJECTION, SOLUTION INTRAMUSCULAR; INTRAVENOUS at 13:51

## 2021-01-01 RX ADMIN — MORPHINE SULFATE 4 MG: 4 INJECTION, SOLUTION INTRAMUSCULAR; INTRAVENOUS at 16:42

## 2021-01-25 PROBLEM — L03.90 CELLULITIS: Status: ACTIVE | Noted: 2021-01-01

## 2021-01-26 PROBLEM — I48.91 ATRIAL FIBRILLATION WITH RAPID VENTRICULAR RESPONSE (HCC): Status: ACTIVE | Noted: 2021-01-01

## 2021-01-26 PROBLEM — L97.909 VENOUS STASIS ULCERS (HCC): Chronic | Status: ACTIVE | Noted: 2021-01-01

## 2021-01-26 PROBLEM — I83.009 VENOUS STASIS ULCERS (HCC): Chronic | Status: ACTIVE | Noted: 2021-01-01

## 2021-01-26 PROBLEM — E87.1 HYPONATREMIA: Status: ACTIVE | Noted: 2021-01-01

## 2021-01-28 PROBLEM — I83.009 VENOUS STASIS ULCERS (HCC): Status: ACTIVE | Noted: 2021-01-01

## 2021-01-28 PROBLEM — L97.909 VENOUS STASIS ULCERS (HCC): Status: ACTIVE | Noted: 2021-01-01

## 2021-02-24 PROBLEM — I50.9 ACUTE ON CHRONIC CONGESTIVE HEART FAILURE (HCC): Status: ACTIVE | Noted: 2021-01-01

## 2021-03-01 NOTE — PLAN OF CARE
Goal Outcome Evaluation:  Plan of Care Reviewed With: patient  Progress: no change  Outcome Summary: no co pain. he is sitting in chair. he is confused at times. dressing to richelle lower ext change today. possible dc to [facility] soon. cont to monitor.

## 2021-03-01 NOTE — PLAN OF CARE
Goal Outcome Evaluation:  Plan of Care Reviewed With: patient     Outcome Summary: PT eval completed. Pt is cheerful and A&O x4 reporting 3/10 pain that is generalized. He was able to perform supine to sit transfer to EOB with supervision and demonstrated slightly impaired balance during MMTs, requiring use of UEs to stay upright. His LE sensation was intact bilaterally and LE AROM was WFL. His LE strength was grossly 4/5. He demonstrated ability to perform sit to stand independently and ambualted 20' with CGA. Patient was repositioned in the chair and had no increases in pain throughout session. PT will continue to address deficits in strength and balance to assist with decreasing risk of falls during household ambulation. Recommend d/c to SNF.

## 2021-03-01 NOTE — PLAN OF CARE
Goal Outcome Evaluation:  Plan of Care Reviewed With: patient  Progress: no change  Outcome Summary: VSS, no c/o of pain; pt rested well throughout the night; slept in bed, postion changed independently; 2L oxygen worn throughout the night; awaiting possible d/c to [facility] today; afib 72-86 w/ PVC coup on tele; bed alarm set; safety maintained

## 2021-03-01 NOTE — THERAPY EVALUATION
Patient Name: Hernan Su  : 1940    MRN: [id number]                              Today's Date: 3/1/2021       Admit Date: 2021    Visit Dx:     ICD-10-CM ICD-9-CM   1. Acute on chronic congestive heart failure, unspecified heart failure type (CMS/HCC)  I50.9 428.0   2. Pleural effusion on right  J90 511.9   3. Atrial fibrillation with rapid ventricular response (CMS/[facility])  I48.91 427.31   4. Hypoxia  R09.02 799.02   5. Decreased activities of daily living (ADL)  Z78.9 V49.89   6. Impaired mobility  Z74.09 799.89     Patient Active Problem List   Diagnosis   • Essential hypertension   • Tobacco abuse   • Anxiety   • Gout   • Hypokalemia   • Alcohol abuse   • ST elevation myocardial infarction (STEMI) (CMS/[facility])   • Coronary artery disease of native artery of native heart with stable angina pectoris (CMS/[facility])   • Mixed hyperlipidemia   • Closed fracture of left hip (CMS/[facility])   • Cellulitis   • Atrial fibrillation with rapid ventricular response (CMS/[facility])   • Bilateral lower extremity venous stasis ulcers (CMS/[facility])   • Hyponatremia suspect secondary to alcohol use and hydrochlorothiazide   • Acute on chronic congestive heart failure (CMS/[facility])     Past Medical History:   Diagnosis Date   • Abnormal EKG    • Acute on chronic congestive heart failure (CMS/[facility]) 2021   • Alcohol abuse    • Arthritis    • Atrial fibrillation with rapid ventricular response (CMS/[facility]) 2021   • B12 deficiency    • Cellulitis    • Coronary artery disease    • Depression    • Gout    • Gout    • HTN (hypertension)    • Hyperlipidemia    • Hypokalemia    • Hyponatremia    • Mixed hyperlipidemia    • Myocardial infarction (CMS/[facility])    • Neuropathy    • On home oxygen therapy    • RLS (restless legs syndrome)    • RLS (restless legs syndrome)      Past Surgical History:   Procedure Laterality Date   • ARM TENDON REPAIR     • CARDIAC CATHETERIZATION N/A 10/19/2018    Procedure: Left Heart Cath;  Surgeon: Antonino Joshua MD;   Location:  [facility] CATH INVASIVE LOC[facility];  Service: Cardiovascular   • CORONARY STENT PLACEMENT     • EYE SURGERY Left    • HIP HEMIARTHROPLASTY Left 7/28/2020    Procedure: HIP HEMIARTHROPLASTY;  Surgeon: Adi Figueredo MD;  Location:  [facility] OR;  Service: Orthopedics;  Laterality: Left;     General Information     Row Name 03/01/21 0940          Physical Therapy Time and Intention    Document Type  evaluation CC: shortness of breath; Dx: Afib, acute on chronic CHF, gout; Hx of CAD, L GIULIANA; Attending: Chelsey  -JUS (r) ANN (t) JUS (c)     Mode of Treatment  physical therapy  -JUS (r) ANN (t) JUS (c)     Row Name 03/01/21 0940          General Information    Patient Profile Reviewed  yes  -JUS (r) ANN (t) JUS (c)     Prior Level of Function  independent:;all household mobility;transfer;bed mobility;min assist:;ADL's  -JUS (r) ANN (t) JUS (c)     Existing Precautions/Restrictions  fall;oxygen therapy device and L/min  -JUS (r) ANN (t) JUS (c)     Barriers to Rehab  previous functional deficit  -JUS (r) ANN (t) JUS (c)     Row Name 03/01/21 0940          Living Environment    Lives With  facility resident [facility]  -JUS (r) ANN (t) JUS (c)     Row Name 03/01/21 0940          Home Main Entrance    Number of Stairs, Main Entrance  none  -JUS (r) ANN (t) JUS (c)     Row Name 03/01/21 0940          Stairs Within Home, Primary    Number of Stairs, Within Home, Primary  none  -JUS (r) ANN (t) JUS (c)     Row Name 03/01/21 0940          Cognition    Orientation Status (Cognition)  oriented x 4  -JUS (r) ANN (t) JUS (c)     Row Name 03/01/21 0940          Safety Issues, Functional Mobility    Impairments Affecting Function (Mobility)  balance;strength  -JUS (r) ANN (t) JUS (c)       User Key  (r) = Recorded By, (t) = Taken By, (c) = Cosigned By    Initials Name Provider Type    Miles Knapp, PT DPT Physical Therapist    Rambo Ugarte, PT Student PT Student        Mobility     Row Name 03/01/21 0940          Bed Mobility    Bed  Mobility  supine-sit  -JUS (r) ANN (t) JUS (c)     Supine-Sit [place] (Bed Mobility)  supervision;verbal cues  -JUS (r) ANN (t) JUS (c)     Assistive Device (Bed Mobility)  head of bed elevated  -JUS (r) ANN (t) JUS (c)     Row Name 03/01/21 0940          Sit-Stand Transfer    Sit-Stand [place] (Transfers)  contact guard  -JUS (r) ANN (t) JUS (c)     Assistive Device (Sit-Stand Transfers)  walker, front-wheeled  -JUS (r) ANN (t) JUS (c)     Row Name 03/01/21 0940          Gait/Stairs (Locomotion)    [place] Level (Gait)  contact guard  -JUS (r) ANN (t) JUS (c)     Assistive Device (Gait)  walker, front-wheeled  -JUS (r) ANN (t) JUS (c)     Distance in Feet (Gait)  20'  -JUS (r) ANN (t) JUS (c)     Deviations/Abnormal Patterns (Gait)  gait speed decreased;stride length decreased  -JUS (r) ANN (t) JUS (c)     Bilateral Gait Deviations  forward flexed posture  -JUS (r) ANN (t) JUS (c)       User Key  (r) = Recorded By, (t) = Taken By, (c) = Cosigned By    Initials Name Provider Type    Miles Knapp, PT DPT Physical Therapist    Rambo Ugarte PT Student PT Student        Obj/Interventions     Row Name 03/01/21 0940          Range of Motion Comprehensive    General Range of Motion  bilateral lower extremity ROM WFL  -JUS (r) ANN (t) JUS (c)     Row Name 03/01/21 0940          Strength Comprehensive (MMT)    General Manual Muscle Testing (MMT) Assessment  lower extremity strength deficits identified  -JUS (r) ANN (t) JUS (c)     Comment, General Manual Muscle Testing (MMT) Assessment  bilateral LE strength grossly 4/5  -JUS (r) ANN (t) JUS (c)     Row Name 03/01/21 0940          Balance    Balance Assessment  sitting static balance;standing static balance;standing dynamic balance  -JUS (r) ANN (t) JUS (c)     Static Sitting Balance  mild impairment;WFL;unsupported;sitting, edge of bed  -JUS (r) ANN (t) JUS (c)     Static Standing Balance  mild impairment;supported;standing  -JUS (r) ANN (t) JUS (c)     Dynamic Standing Balance  mild  impairment;supported;standing  -JUS (r) ANN (t) JUS (c)     Row Name 03/01/21 0940          Sensory Assessment (Somatosensory)    Sensory Assessment (Somatosensory)  LE sensation intact Pt reports tingling in R LE in wound areas  -JUS (r) ANN (t) JUS (c)       User Key  (r) = Recorded By, (t) = Taken By, (c) = Cosigned By    Initials Name Provider Type    Miles Knapp, PT DPT Physical Therapist    Rambo Ugarte, PT Student PT Student        Goals/Plan     Row Name 03/01/21 0940          Gait Training Goal 1 (PT)    Activity/Assistive Device (Gait Training Goal 1, PT)  gait (walking locomotion);assistive device use;decrease fall risk;diminish gait deviation;improve balance and speed;increase endurance/gait distance  -JUS (r) ANN (t) JUS (c)     Counselor Level (Gait Training Goal 1, PT)  independent  -JUS (r) ANN (t) JUS (c)     Distance (Gait Training Goal 1, PT)  200'  -JUS (r) ANN (t) JUS (c)     Time Frame (Gait Training Goal 1, PT)  long term goal (LTG);10 days  -JUS (r) ANN (t) JUS (c)     Progress/Outcome (Gait Training Goal 1, PT)  goal ongoing  -JUS (r) ANN (t) JUS (c)     Row Name 03/01/21 0940          ROM Goal 1 (PT)    ROM Goal 1 (PT)  Patient will demonstrate full LE AROM with decreased trunk sway and LOB while sitting EOB to indicate improved strength in LEs and trunk  -JUS (r) ANN (t) JUS (c)     Time Frame (ROM Goal 1, PT)  long-term goal (LTG);1 week  -JUS (r) ANN (t) JUS (c)     Progress/Outcome (ROM Goal 1, PT)  goal ongoing  -JUS (r) ANN (t) JUS (c)       User Key  (r) = Recorded By, (t) = Taken By, (c) = Cosigned By    Initials Name Provider Type    Miles Knapp, PT DPT Physical Therapist    Rambo Ugarte, PT Student PT Student        Clinical Impression     Row Name 03/01/21 0940          Pain    Additional Documentation  Pain Scale: Numbers Pre/Post-Treatment (Group)  -JUS (r) ANN (t) JUS jorge)     Row Name 03/01/21 0940          Pain Scale: Numbers Pre/Post-Treatment    Pretreatment Pain Rating   3/10  -JUS (r) ANN (t) JUS (c)     Posttreatment Pain Rating  3/10  -JUS (r) ANN (t) JUS (c)     Pain Location - Orientation  generalized  -JUS (r) ANN (t) JUS (c)     Pain Intervention(s)  Ambulation/increased activity;Repositioned  -JUS (r) ANN (t) JUS (c)     Row Name 03/01/21 0927          Plan of Care Review    Plan of Care Reviewed With  patient  -JUS (r) ANN (t) JUS (c)     Outcome Summary  PT eval completed. Pt is cheerful and A&O x4 reporting 3/10 pain that is generalized. He was able to perform supine to sit transfer to EOB with supervision and demonstrated slightly impaired balance during MMTs, requiring use of UEs to stay upright. His LE sensation was intact bilaterally and LE AROM was WFL. His LE strength was grossly 4/5. He demonstrated ability to perform sit to stand independently and ambualted 20' with CGA. Patient was repositioned in the chair and had no increases in pain throughout session. PT will continue to address deficits in strength and balance to assist with decreasing risk of falls during household ambulation. Recommend d/c to SNF.  -JUS (r) ANN (t) JUS (c)     Row Name 03/01/21 0995          Therapy Assessment/Plan (PT)    Patient/Family Therapy Goals Statement (PT)  To go home, decrease pain  -JUS (r) ANN (t) JUS (c)     Rehab Potential (PT)  good, to achieve stated therapy goals  -JUS (r) ANN (t) JUS (c)     Criteria for Skilled Interventions Met (PT)  yes;skilled treatment is necessary  -JUS (r) ANN (t) JUS (c)     Predicted Duration of Therapy Intervention (PT)  until d/c  -JUS (r) ANN (t) JUS (c)     Row Name 03/01/21 0911          Vital Signs    O2 Delivery Pre Treatment  nasal cannula 2L  -JUS (r) ANN (t) JUS (c)     O2 Delivery Intra Treatment  nasal cannula  -JUS (r) ANN (t) JUS (c)     O2 Delivery Post Treatment  nasal cannula  -JUS (r) ANN (t) JUS (c)     Pre Patient Position  Supine  -JUS (r) ANN (t) JUS (c)     Intra Patient Position  Standing  -JUS (r) ANN (t) JUS (c)     Post Patient Position  Sitting  -JUS (r) ANN (t)  JUS (c)     Row Name 03/01/21 0940          Positioning and Restraints    Pre-Treatment Position  in bed  -JUS (r) ANN (t) JUS (c)     Post Treatment Position  chair  -JUS (r) ANN (t) JUS (c)     In Chair  reclined;call light within reach;encouraged to call for assist;exit alarm on;legs elevated  -JUS (r) ANN (t) JUS (c)       User Key  (r) = Recorded By, (t) = Taken By, (c) = Cosigned By    Initials Name Provider Type    Miles Knapp, PT DPT Physical Therapist    Rambo Ugarte, PT Student PT Student        Outcome Measures     Row Name 03/01/21 0940          How much help from another person do you currently need...    Turning from your back to your side while in flat bed without using bedrails?  3  -JUS (r) ANN (t) JUS (c)     Moving from lying on back to sitting on the side of a flat bed without bedrails?  3  -JUS (r) ANN (t) JUS (c)     Moving to and from a bed to a chair (including a wheelchair)?  3  -JUS (r) ANN (t) JUS (c)     Standing up from a chair using your arms (e.g., wheelchair, bedside chair)?  4  -JUS (r) ANN (t) JUS (c)     Climbing 3-5 steps with a railing?  3  -JUS (r) ANN (t) JUS (c)     To walk in hospital room?  3  -JUS (r) ANN (t) JUS (c)     AM-PAC 6 Clicks Score (PT)  19  -JUS (r) ANN (t)     Row Name 03/01/21 0940          Functional Assessment    Outcome Measure Options  AM-PAC 6 Clicks Basic Mobility (PT)  -JUS (r) ANN (t) JUS (c)       User Key  (r) = Recorded By, (t) = Taken By, (c) = Cosigned By    Initials Name Provider Type    Miles Knapp, PT DPT Physical Therapist    Rambo Ugarte, PT Student PT Student        Physical Therapy Education                 Title: PT OT SLP Therapies (In Progress)     Topic: Physical Therapy (In Progress)     Point: Mobility training (Done)     Learning Progress Summary           Patient Acceptance, E, VU by ANN at 3/1/2021 1108    Comment: Pt educated on proper mechanics for sit to stand and proper use of walker during ambulation                   Point:  Home exercise program (Not Started)     Learner Progress:  Not documented in this visit.          Point: Body mechanics (Done)     Learning Progress Summary           Patient Acceptance, E, VU by  at 3/1/2021 1108    Comment: Pt educated on proper mechanics for sit to stand and proper use of walker during ambulation                   Point: Precautions (Not Started)     Learner Progress:  Not documented in this visit.                      User Key     Initials Effective Dates Name Provider Type Discipline     01/19/21 -  Rambo Hernandez, PT Student PT Student PT              PT Recommendation and Plan     Plan of Care Reviewed With: patient  Outcome Summary: PT eval completed. Pt is cheerful and A&O x4 reporting 3/10 pain that is generalized. He was able to perform supine to sit transfer to EOB with supervision and demonstrated slightly impaired balance during MMTs, requiring use of UEs to stay upright. His LE sensation was intact bilaterally and LE AROM was WFL. His LE strength was grossly 4/5. He demonstrated ability to perform sit to stand independently and ambualted 20' with CGA. Patient was repositioned in the chair and had no increases in pain throughout session. PT will continue to address deficits in strength and balance to assist with decreasing risk of falls during household ambulation. Recommend d/c to SNF.     Time Calculation:   PT Charges     Row Name 03/01/21 0988             Time Calculation    Start Time  1020  -JUS (r) ANN (t) JUS (c)      Stop Time  1053  -JUS (r) ANN (t) JUS (c)      Time Calculation (min)  33 min  -JUS (r) ANN (t)      PT Received On  03/01/21  -JUS (r) ANN (t) JUS (c)      PT Goal Re-Cert Due Date  03/11/21  -JUS (r) ANN (t) JUS (c)         Time Calculation- PT    Total Timed Code Minutes- PT  43 minute(s) Add 10 mins for chart review  -JUS (r) ANN (t) JUS (c)        User Key  (r) = Recorded By, (t) = Taken By, (c) = Cosigned By    Initials Name Provider Type    Miles Knapp, PT  DPT Physical Therapist    Rambo Ugarte, PT Student PT Student            PT G-Codes  Outcome Measure Options: AM-PAC 6 Clicks Daily Activity (OT)  AM-PAC 6 Clicks Score (PT): 19  AM-PAC 6 Clicks Score (OT): 19    Rambo Hernandez, PT Student  3/1/2021

## 2021-03-01 NOTE — PLAN OF CARE
Goal Outcome Evaluation:  Plan of Care Reviewed With: patient     Outcome Summary: OT eval completed. Pt is A&Ox4. Demo's decreased strength, balance, activity tolerance and increased SOA. He was able come to sitting at EOB with Sup. Max A for LBD d/t wounds on B feet. CGA for sit <> stand t/f from EOB and all in room functional mobility with rwx, one LOB during mobility, pt able to self-correct. He would benefit from skilled OT services to address these deficits. Recommend d/c back to [facility].

## 2021-03-01 NOTE — PROGRESS NOTES
Continued Stay Note   [place]     Patient Name: Hernan Su  MRN: [id number]  Today's Date: 3/1/2021    Admit Date: 2/24/2021    Discharge Plan     Row Name 03/01/21 0808       Plan    Plan  [facility]    Patient/Family in Agreement with Plan  yes    Plan Comments  SW has left a message for Lorin in admissions at [facility] to see if precert. is completed.        Discharge Codes    No documentation.             COLT Lazar

## 2021-03-01 NOTE — PROGRESS NOTES
Continued Stay Note  [facility]     Patient Name: Hernan Su  MRN: [id number]  Today's Date: 3/1/2021    Admit Date: 2/24/2021    Discharge Plan     Row Name 03/01/21 1058       Plan    Plan  [facility] (awaiting precert)    Patient/Family in Agreement with Plan  yes    Plan Comments  Lorin from [facility] is awaiting PT/OT evaluations.  MIKO Wu has ordered evals.  [facility] will follow.    Row Name 03/01/21 0808       Plan    Plan  [facility]    Patient/Family in Agreement with Plan  yes    Plan Comments  [facility] has left a message for Lorin in admissions at [facility] to see if precert. is completed.        Discharge Codes    No documentation.             KANA LazarW

## 2021-03-01 NOTE — THERAPY EVALUATION
Patient Name: Hernan Su  : 1940    MRN: [id number]                              Today's Date: 3/1/2021       Admit Date: 2021    Visit Dx:     Therapist utilized gait belt, applied non-slipped socks, provided fall risk education/prevention, & facilitated muscle strengthening PRN to reduce patient falls risk during this session.      ICD-10-CM ICD-9-CM   1. Acute on chronic congestive heart failure, unspecified heart failure type (CMS/[facility])  I50.9 428.0   2. Pleural effusion on right  J90 511.9   3. Atrial fibrillation with rapid ventricular response (CMS/[facility])  I48.91 427.31   4. Hypoxia  R09.02 799.02   5. Decreased activities of daily living (ADL)  Z78.9 V49.89     Patient Active Problem List   Diagnosis   • Essential hypertension   • Tobacco abuse   • Anxiety   • Gout   • Hypokalemia   • Alcohol abuse   • ST elevation myocardial infarction (STEMI) (CMS/[facility])   • Coronary artery disease of native artery of native heart with stable angina pectoris (CMS/[facility])   • Mixed hyperlipidemia   • Closed fracture of left hip (CMS/[facility])   • Cellulitis   • Atrial fibrillation with rapid ventricular response (CMS/[facility])   • Bilateral lower extremity venous stasis ulcers (CMS/[facility])   • Hyponatremia suspect secondary to alcohol use and hydrochlorothiazide   • Acute on chronic congestive heart failure (CMS/[facility])     Past Medical History:   Diagnosis Date   • Abnormal EKG    • Acute on chronic congestive heart failure (CMS/[facility]) 2021   • Alcohol abuse    • Arthritis    • Atrial fibrillation with rapid ventricular response (CMS/[facility]) 2021   • B12 deficiency    • Cellulitis    • Coronary artery disease    • Depression    • Gout    • Gout    • HTN (hypertension)    • Hyperlipidemia    • Hypokalemia    • Hyponatremia    • Mixed hyperlipidemia    • Myocardial infarction (CMS/[facility])    • Neuropathy    • On home oxygen therapy    • RLS (restless legs syndrome)    • RLS (restless legs syndrome)      Past Surgical History:    Procedure Laterality Date   • ARM TENDON REPAIR     • CARDIAC CATHETERIZATION N/A 10/19/2018    Procedure: Left Heart Cath;  Surgeon: Antonino Joshua MD;  Location:  [facility] CATH INVASIVE LOC[facility];  Service: Cardiovascular   • CORONARY STENT PLACEMENT     • EYE SURGERY Left    • HIP HEMIARTHROPLASTY Left 7/28/2020    Procedure: HIP HEMIARTHROPLASTY;  Surgeon: Adi Figueredo MD;  Location:  [facility] OR;  Service: Orthopedics;  Laterality: Left;     General Information     Row Name 03/01/21 1009          OT Time and Intention    Mode of Treatment  occupational therapy  -J     Row Name 03/01/21 1009          General Information    Patient Profile Reviewed  yes  -JJ     Prior Level of Function  independent:;all household mobility;transfer;bed mobility;min assist:;ADL's per pt report  -J     Existing Precautions/Restrictions  fall;oxygen therapy device and L/min  -JJ     Barriers to Rehab  previous functional deficit  -JJ     Row Name 03/01/21 1009          [facility]    [facility]  [facility]     Row Name 03/01/21 1009          Home Main Entrance    Number of Stairs, Main Entrance  none  -JJ     Row Name 03/01/21 1009          Stairs Within Home, Primary    Number of Stairs, Within Home, Primary  none  -JJ     Row Name 03/01/21 1009          Cognition    Orientation Status (Cognition)  oriented x 4  -JJ     Row Name 03/01/21 1009          Safety Issues, Functional Mobility    Safety Issues Affecting Function (Mobility)  impulsivity  -JJ     Impairments Affecting Function (Mobility)  balance;strength;endurance/activity tolerance;shortness of breath  -JJ       User Key  (r) = Recorded By, (t) = Taken By, (c) = Cosigned By    Initials Name Provider Type    Stacy Wiseman OTR/L Occupational Therapist          Mobility/ADL's     Row Name 03/01/21 1009          Bed Mobility    Bed Mobility  supine-sit  -AMISH     Supine-Sit [place] (Bed Mobility)  supervision;verbal cues  -AMISH      Assistive Device (Bed Mobility)  head of bed elevated  -     Row Name 03/01/21 1009          Transfers    Transfers  sit-stand transfer  -     Sit-Stand [place] (Transfers)  contact guard  -     Row Name 03/01/21 1009          Sit-Stand Transfer    Assistive Device (Sit-Stand Transfers)  walker, front-wheeled  -     Row Name 03/01/21 1009          Functional Mobility    Functional Mobility- Ind. Level  contact guard assist;minimum assist (75% patient effort)  -     Functional Mobility- Device  rolling walker  -     Functional Mobility- Safety Issues  supplemental O2  -     Functional Mobility- Comment  in room mobility. 1 LOB during mobility.  -     Row Name 03/01/21 1009          Activities of Daily Living    BADL Assessment/Intervention  lower body dressing  -[facility] Name 03/01/21 1009          Lower Body Dressing Assessment/Training    [place] Level (Lower Body Dressing)  don;socks;maximum assist (25% patient effort)  -     Position (Lower Body Dressing)  edge of bed sitting  -       User Key  (r) = Recorded By, (t) = Taken By, (c) = Cosigned By    Initials Name Provider Type    Stacy Wiseman OTR/L Occupational Therapist        Obj/Interventions     Row Name 03/01/21 1009          Sensory Assessment (Somatosensory)    Sensory Assessment (Somatosensory)  sensation intact  -[facility] Name 03/01/21 1009          Vision Assessment/Intervention    Visual Impairment/Limitations  WFL  -[facility] Name 03/01/21 1009          Range of Motion Comprehensive    General Range of Motion  bilateral upper extremity ROM [facility]  -     Row Name 03/01/21 1009          Strength Comprehensive (MMT)    Comment, General Manual Muscle Testing (MMT) Assessment  B UE strength 4/5  -[facility] Name 03/01/21 1009          Balance    Balance Assessment  sitting static balance;standing static balance  -     Static Sitting Balance  mild impairment;WFL;unsupported;sitting, edge of bed  -     Static  Standing Balance  mild impairment;supported;standing  -JJ     Comment, Balance  one LOB in standing  -JJ       User Key  (r) = Recorded By, (t) = Taken By, (c) = Cosigned By    Initials Name Provider Type    Stacy Wiseman OTR/L Occupational Therapist        Goals/Plan     Row Name 03/01/21 1009          Bathing Goal 1 (OT)    Activity/Device (Bathing Goal 1, OT)  bathing skills, all  -JJ     [place] Level/Cues Needed (Bathing Goal 1, OT)  set-up required;standby assist  -JJ     Time Frame (Bathing Goal 1, OT)  long term goal (LTG);by discharge  -JJ     Progress/Outcomes (Bathing Goal 1, OT)  goal ongoing  -     Row Name 03/01/21 1009          Dressing Goal 1 (OT)    Activity/Device (Dressing Goal 1, OT)  lower body dressing  -JJ     [place]/Cues Needed (Dressing Goal 1, OT)  contact guard assist  -JJ     Time Frame (Dressing Goal 1, OT)  long term goal (LTG);by discharge  -JJ     Progress/Outcome (Dressing Goal 1, OT)  goal ongoing  -     Row Name 03/01/21 1009          Toileting Goal 1 (OT)    Activity/Device (Toileting Goal 1, OT)  toileting skills, all  -JJ     [place] Level/Cues Needed (Toileting Goal 1, OT)  independent  -JJ     Time Frame (Toileting Goal 1, OT)  long term goal (LTG);by discharge  -JJ     Progress/Outcome (Toileting Goal 1, OT)  goal ongoing  -     Row Name 03/01/21 1009          Therapy Assessment/Plan (OT)    Planned Therapy Interventions (OT)  activity tolerance training;adaptive equipment training;BADL retraining;functional balance retraining;occupation/activity based interventions;patient/caregiver education/training;strengthening exercise;transfer/mobility retraining  -JJ       User Key  (r) = Recorded By, (t) = Taken By, (c) = Cosigned By    Initials Name Provider Type    Stacy Wiseman OTR/L Occupational Therapist        Clinical Impression     Row Name 03/01/21 1009          Pain Assessment    Additional Documentation  Pain Scale: Numbers  Pre/Post-Treatment (Group)  -     Row Name 03/01/21 1009          Pain Scale: Numbers Pre/Post-Treatment    Pretreatment Pain Rating  3/10  -     Pain Location - Orientation  generalized  -     Pain Intervention(s)  Medication (See MAR);Ambulation/increased activity;Repositioned  -     Row Name 03/01/21 1009          Plan of Care Review    Plan of Care Reviewed With  patient  -     Outcome Summary  OT eval completed. Pt is A&Ox4. Demo's decreased strength, balance, activity tolerance and increased SOA. He was able come to sitting at EOB with Sup. Max A for LBD d/t wounds on B feet. CGA for sit <> stand t/f from EOB and all in room functional mobility with rwx, one LOB during mobility, pt able to self-correct. He would benefit from skilled OT services to address these deficits. Recommend d/c back to [facility].  -     Row Name 03/01/21 1009          Therapy Assessment/Plan (OT)    Patient/Family Therapy Goal Statement (OT)  return to [facility]  -     Rehab Potential (OT)  good, to achieve stated therapy goals  -     Criteria for Skilled Therapeutic Interventions Met (OT)  yes;skilled treatment is necessary  -     Therapy Frequency (OT)  3 times/wk  -     Predicted Duration of Therapy Intervention (OT)  10 days  -     Row Name 03/01/21 1009          Therapy Plan Review/Discharge Plan (OT)    Anticipated Discharge Disposition (OT)  [facility] nursing facility  -     Row Name 03/01/21 1009          Vital Signs    Pre Patient Position  Supine  -     Intra Patient Position  Standing  -     Post Patient Position  Sitting  -     Row Name 03/01/21 1009          Positioning and Restraints    Pre-Treatment Position  in bed  -     Post Treatment Position  chair  -     In Chair  notified nsg;reclined;call light within reach;encouraged to call for assist;exit alarm on;legs elevated  -       User Key  (r) = Recorded By, (t) = Taken By, (c) = Cosigned By    Initials Name Provider Type    AMISH Giraldo  KIA Kumar/JEEVAN Occupational Therapist        Outcome Measures     Row Name 03/01/21 1009          How much help from another is currently needed...    Putting on and taking off regular lower body clothing?  2  -JJ     Bathing (including washing, rinsing, and drying)  2  -JJ     Toileting (which includes using toilet bed pan or urinal)  3  -JJ     Putting on and taking off regular upper body clothing  4  -JJ     Taking care of personal grooming (such as brushing teeth)  4  -JJ     Eating meals  4  -JJ     AM-PAC 6 Clicks Score (OT)  19  -JJ     Row Name 03/01/21 1009          Functional Assessment    Outcome Measure Options  AM-PAC 6 Clicks Daily Activity (OT)  -JJ       User Key  (r) = Recorded By, (t) = Taken By, (c) = Cosigned By    Initials Name Provider Type    Stacy Wiseman OTR/L Occupational Therapist        Occupational Therapy Education                 Title: PT OT SLP Therapies (In Progress)     Topic: Occupational Therapy (In Progress)     Point: ADL training (Done)     Description:   Instruct learner(s) on proper safety adaptation and remediation techniques during self care or transfers.   Instruct in proper use of assistive devices.              Learning Progress Summary           Patient Acceptance, E, VU by AMISH at 3/1/2021 1143                   Point: Home exercise program (Not Started)     Description:   Instruct learner(s) on appropriate technique for monitoring, assisting and/or progressing therapeutic exercises/activities.              Learner Progress:  Not documented in this visit.          Point: Precautions (Done)     Description:   Instruct learner(s) on prescribed precautions during self-care and functional transfers.              Learning Progress Summary           Patient Acceptance, E, VU by AMISH at 3/1/2021 1143                   Point: Body mechanics (Done)     Description:   Instruct learner(s) on proper positioning and spine alignment during self-care, functional mobility  activities and/or exercises.              Learning Progress Summary           Patient Acceptance, E, VU by AMISH at 3/1/2021 1143                               User Key     Initials Effective Dates Name Provider Type Discipline    AMISH 12/04/20 -  Stacy Giraldo OTR/L Occupational Therapist OT              OT Recommendation and Plan  Planned Therapy Interventions (OT): activity tolerance training, adaptive equipment training, BADL retraining, functional balance retraining, occupation/activity based interventions, patient/caregiver education/training, strengthening exercise, transfer/mobility retraining  Therapy Frequency (OT): 3 times/wk  Plan of Care Review  Plan of Care Reviewed With: patient  Outcome Summary: OT eval completed. Pt is A&Ox4. Demo's decreased strength, balance, activity tolerance and increased SOA. He was able come to sitting at EOB with Sup. Max A for LBD d/t wounds on B feet. CGA for sit <> stand t/f from EOB and all in room functional mobility with rwx, one LOB during mobility, pt able to self-correct. He would benefit from skilled OT services to address these deficits. Recommend d/c back to [facility].     Time Calculation:   Time Calculation- OT     Row Name 03/01/21 1147             Time Calculation- OT    OT Start Time  1003 add 5 minutes for chart review  -      OT Stop Time  1053  -      OT Time Calculation (min)  50 min  -DANILO      OT Received On  03/01/21  -DANILO      OT Goal Re-Cert Due Date  03/11/21  -DANILO        User Key  (r) = Recorded By, (t) = Taken By, (c) = Cosigned By    Initials Name Provider Type    Stacy Wiseman OTR/L Occupational Therapist        Therapy Charges for Today     Code Description Service Date Service Provider Modifiers Qty    [id number]  OT EVAL LOW COMPLEXITY 4 3/1/2021 Stacy Giraldo OTR/L GO 1               KIA Grace/JEEVAN  3/1/2021

## 2021-03-01 NOTE — PAYOR COMM NOTE
"3/1/21 [facility] [phone]  [phone]    FAXING UPDATE CLINICAL. MD HAS NOT  SEEN THE PATIENT TODAY.  I WILL FAX PROGRESS NOTES IN AM.  AUTH [id number]              Jm Mcneil (80 y.o. Male)     Date of Birth Social Security Number Address Home Phone MRN    1940  [address] [address] [phone] [id number]    Anabaptist Marital Status          Hoahaoism        Admission Date Admission Type Admitting Provider Attending Provider Department, Room/Bed    2/24/21 Emergency Jm Barbosa MD [place], Glenn Riego, MD [facility] 4B, 402/1    Discharge Date Discharge Disposition Discharge Destination                       Attending Provider: Jm Barbosa MD    Allergies: Shellfish-derived Products    Isolation: None   Infection: None   Code Status: CPR    Ht: 177.8 cm (70\")   Wt: 64.8 kg (142 lb 14.4 oz)    Admission Cmt: None   Principal Problem: Atrial fibrillation with rapid ventricular response (CMS/HCC) [I48.91]                 Active Insurance as of 2/24/2021     Primary Coverage     Payor Plan Insurance Group Employer/Plan Group    [facility] MEDICARE REPLACEMENT [facility] MEDICARE REPLACEMENT      Payor Plan Address Payor Plan Phone Number Payor Plan Fax Number Effective Dates    [address] [address] [phone]  7/28/2020 - None Entered    [facility] [id number]       Subscriber Name Subscriber Birth Date Member ID       JM MCNEIL 1940 [id number]                 Emergency Contacts      (Rel.) Home Phone Work Phone Mobile Phone    JarrellBella (Significant Other) [phone] -- --    Sumanth Mcneil (Son) [phone] -- --         Department Encounter #   2/24/2021 10:34 AM [facility] [id number]   Justin Medina DO   Physician   Hospitalist   Progress Notes   Signed   Date of Service:  02/28/21 1006   Creation Time:  02/28/21 1006            Signed             Show:Clear " all  [x]Manual[x]Template[x]Copied    Added by:  [x]Justin Medina DO    []Fifi for details       [facility] Medicine Services  INPATIENT PROGRESS NOTE     Patient Name: Hernan Su  Date of Admission: 2/24/2021  Today's Date: 02/28/21  Length of Stay: 4  Primary Care Physician: Nataly Faria APRN     Subjective   Chief Complaint: Follow-up A. Fib/acute on chronic CHF     HPI:  Daughter is at bedside.  Patient seems much more confused today.  Clinically he seems to be continuing to improve.  Oxygen levels have been stable.  He is afebrile.  He denies chest pain or shortness of breath.  Continues to have good response to Lasix with high output.     ROS:  All pertinent negatives and positives are as above. All other systems have been reviewed and are negative unless otherwise stated.      Objective    Temp:  [96.7 °F (35.9 °C)-99 °F (37.2 °C)] 97.6 °F (36.4 °C)  Heart Rate:  [62-87] 86  Resp:  [16-18] 16  BP: (113-122)/(61-78) 113/65  Physical Exam  GEN: Awake, alert, interactive, in NAD, appears frail and chronically ill  HEENT:  PERRLA, EOMI, Anicteric, Trachea midline  Lungs:  still diminished at R base but breath sounds improving overall, no wheezes  Heart: irreg/irreg, +S1/s2, no rub  ABD: soft, nt/nd, +BS, no guarding/rebound  Extremities: trace b/l LE edema, no erythema  Skin: b/l LE wounds and scabs, no drainage   Neuro: AAOx 1-2, no focal deficits  Psych: normal mood & affect        Results Review:  I have reviewed the labs, radiology results, and diagnostic studies.     Laboratory Data:         Results from last 7 days   Lab Units 02/25/21  0402 02/24/21  1110   WBC 10*3/mm3 5.90 7.61   HEMOGLOBIN g/dL 13.3 13.4   HEMATOCRIT % 40.4 40.5   PLATELETS 10*3/mm3 235 247                  Results from last 7 days   Lab Units 02/28/21  0536 02/27/21  0307 02/26/21  0516   02/24/21  1110   SODIUM mmol/L 136 136 139   < > 138                          POTASSIUM mmol/L  3.7 3.2* 3.9   < > 3.8   CHLORIDE mmol/L 96* 93* 94*   < > 94*   CO2 mmol/L 34.0* 37.0* 40.0*   < > 37.0*   BUN mg/dL 17 14 14   < > 13   CREATININE mg/dL 0.87 0.77 0.74*   < > 0.71*   CALCIUM mg/dL 9.6 9.3 9.4   < > 9.8   BILIRUBIN mg/dL  --   --   --   --  0.9   ALK PHOS U/L  --   --   --   --  188*   ALT (SGPT) U/L  --   --   --   --  33   AST (SGOT) U/L  --   --   --   --  36   GLUCOSE mg/dL 91 103* 95   < > 111*    < > = values in this interval not displayed.         Culture Data:         Blood Culture   Date Value Ref Range Status   02/24/2021 No growth at 24 hours   Preliminary         Radiology Data:       Imaging Results (Last 24 Hours)      ** No results found for the last 24 hours. **         Active Hospital Problems     Diagnosis   • **Atrial fibrillation with rapid ventricular response (CMS/HCC)   • Acute on chronic congestive heart failure (CMS/HCC)   • Bilateral lower extremity venous stasis ulcers (CMS/HCC)   • Essential hypertension   • Gout         #1 A. fib with RVR -rate controlled and back on home Cardizem  CD.  Currently covering with Lovenox while in the hospital but he previously was not on anticoagulation due to his history and fall risk.  Given his ongoing confusion issues and no permanent plans for facility placement feel patient likely still would not be a good candidate for blood thinners.  This was discussed with his daughter who agrees.  Defer decision to discharge provider.     #2 acute on chronic combined CHF -large right pleural effusion on arrival. Previous echo with an EF of 36 to 40% and grade 1 diastolic dysfunction.  Echo fairly similar with an EF of around 35%.  Good response to IV diuretics, will give another dose today.  Could potentially convert to oral diuretics soon.  Monitor urinary output.  Patient is not on beta-blocker or ACE inhibitor prior to arrival despite known history.  Unclear why at this time.  Defer to his outpatient cardiologist in follow-up.  May  benefit from change of Cardizem to Toprol-XL.     #3 large right pleural effusion -in the setting of #1 and 2 above.  No fevers or signs of infection.  Improving on x-ray with diuresis.     #4 hypokalemia -improved after replacement yesterday     #5 essential hypertension -monitor on Cardizem and adjust as needed.     #6 gout -on allopurinol     #7 CAD -small troponin arrival but no chest pain or acute EKG changes.  Troponin stable x3 and not consistent with ACS.       Discharge Planning: I expect the patient to be discharged back to skilled nursing facility in the next 24 to 48 hours pending clinical course.     Electronically signed by Justin Medina DO, 02/28/21, 10:06 CST.     Date/Time  Temp  Pulse  Resp  BP  Device (Oxygen Therapy)  Flow (L/min)  SpO2   03/01/21 1100  97.6 (36.4)  72  16  117/96  nasal cannula  2  95   03/01/21 0700  97.6 (36.4)  70  16  131/67  nasal cannula  2  95   03/01/21 0357  97.4 (36.3)  79  16  118/73  nasal cannula  2  100   03/01/21 0025                   Final result   Patient Location    Patient Class Location   Inpatient [facility] 4B, 402, 1     [phone]   Appointment Information    PACS Images     Radiology Images   Study Result    HISTORY: Pleural effusion, hypoxia     CXR: 2 views the chest are obtained.     COMPARISON: 2/24/2021     FINDINGS: There is a decreasing size moderate left pleural effusion.  There are bilateral pulmonary opacities favoring edema. Cardiomegaly.  Questionable trace left pleural effusion. No pneumothorax. Thoracic  aortic calcification. Old healed left clavicle fracture.     IMPRESSION:  1. Decreasing size moderate right pleural effusion when compared to  2/24/2021. Cardiomegaly with mixed interstitial alveolar pulmonary  edema-CHF favored.  This report was finalized on 02/27/2021 09:35 by Dr. Stacy Ronquillo MD.     Metabolic Panel  Order: [id number]  Status:  Final result   Visible to patient:  No (not released) Next appt:  None  Specimen  Information: Blood        Component   Ref Range & Units 04:16 1d ago 2d ago 3d ago 4d ago 5d ago   Glucose   65 - 99 mg/dL 93  91  103High   95  97  111High     BUN   8 - 23 mg/dL 18  17  14  14  11  13    Creatinine   0.76 - 1.27 mg/dL 0.92  0.87  0.77  0.74Low   0.68Low   0.71Low     Sodium   136 - 145 mmol/L 137  136  136  139  140  138    Potassium   3.5 - 5.2 mmol/L 3.7  3.7  3.2Low   3.9  3.1Low   3.8    Chloride   98 - 107 mmol/L 98  96Low   93Low   94Low   93Low   94Low     CO2   22.0 - 29.0 mmol/L 32.0High   34.0High   37.0High   40.0High   40.0High   37.0High     Calcium   8.6 - 10.5 mg/dL 9.8                       Current Facility-Administered Medications   Medication Dose Route Frequency Provider Last Rate Last Admin   • allopurinol (ZYLOPRIM) tablet 300 mg  300 mg Oral Daily Justin Medina DO   300 mg at 03/01/21 0949   • amitriptyline (ELAVIL) tablet 25 mg  25 mg Oral Nightly Justin Medina DO   25 mg at 02/28/21 2117   • aspirin EC tablet 81 mg  81 mg Oral Daily Justin Medina DO   81 mg at 03/01/21 0946   • dilTIAZem CD (CARDIZEM CD) 24 hr capsule 360 mg  360 mg Oral Q24H Justin Medina DO   360 mg at 03/01/21 0946   • enoxaparin (LOVENOX) syringe 70 mg  1 mg/kg Subcutaneous Q12H Justin Medina DO   70 mg at 03/01/21 0620   • furosemide (LASIX) tablet 40 mg  40 mg Oral Daily Justin Medina DO   40 mg at 03/01/21 0946   • gabapentin (NEURONTIN) capsule 100 mg  100 mg Oral Q8H Justin Medina DO   100 mg at 03/01/21 0623   • Pharmacy to Dose enoxaparin (LOVENOX)   Does not apply Continuous PRN Justin Medina DO       • sodium chloride 0.9 % flush 10 mL  10 mL Intravenous Q12H Justin Medina DO   10 mL at 03/01/21 0947   • sodium chloride 0.9 % flush 10 mL  10 mL Intravenous PRN Justin Medina, DO

## 2021-03-02 NOTE — PROGRESS NOTES
Malnutrition Severity Assessment    Patient Name:  Hernan Su  YOB: 1940  MRN: [id number]  Admit Date:  2/24/2021    Patient meets criteria for : Moderate (non-severe) Malnutrition        Malnutrition Severity Assessment  Malnutrition Type: Chronic Disease - Related Malnutrition     Malnutrition Type (last 8 hours)      Malnutrition Severity Assessment     Row Name 03/02/21 1638       Malnutrition Severity Assessment    Malnutrition Type  Chronic Disease - Related Malnutrition    Row Name 03/02/21 1638       Unintentional Weight Loss     Unintentional Weight Loss   -- Pt not able to quantify any wt loss at this time, however is 82.5% if IBW    Row Name 03/02/21 1638       Muscle Loss    Loss of Muscle Mass Findings  Severe    Rastafari Region  Moderate - slight depression    Clavicle Bone Region  Moderate - some protrusion in females, visible in males    Acromion Bone Region  Severe - squared shoulders, bones, and acromion process protrusion prominent    Scapular Bone Region  Severe - prominent bones, depressions easily visible between ribs, scapula, spine, shoulders    Dorsal Hand Region  Moderate - slight depression    Row Name 03/02/21 1638       Fat Loss    Subcutaneous Fat Loss Findings  Moderate    Orbital Region   Moderate -  somewhat hollowness, slightly dark circles    Upper Arm Region  Moderate - some fat tissue, not ample    Row Name 03/02/21 1638       Criteria Met (Must meet criteria for severity in at least 2 of these categories: M Wasting, Fat Loss, Fluid, Secondary Signs, Wt. Status, Intake)    Patient meets criteria for   Moderate (non-severe) Malnutrition          Electronically signed by:  Kimberyl Barragan RDN, LD  03/02/21 16:40 CST

## 2021-03-02 NOTE — THERAPY TREATMENT NOTE
Acute Care - Occupational Therapy Treatment Note  [facility]     Patient Name: Hernan Su  : 1940  MRN: [id number]  Today's Date: 3/2/2021             Admit Date: 2021       ICD-10-CM ICD-9-CM   1. Acute on chronic congestive heart failure, unspecified heart failure type (CMS/[facility])  I50.9 428.0   2. Pleural effusion on right  J90 511.9   3. Atrial fibrillation with rapid ventricular response (CMS/[facility])  I48.91 427.31   4. Hypoxia  R09.02 799.02   5. Decreased activities of daily living (ADL)  Z78.9 V49.89   6. Impaired mobility  Z74.09 799.89     Patient Active Problem List   Diagnosis   • Essential hypertension   • Tobacco abuse   • Anxiety   • Gout   • Hypokalemia   • Alcohol abuse   • ST elevation myocardial infarction (STEMI) (CMS/[facility])   • Coronary artery disease of native artery of native heart with stable angina pectoris (CMS/[facility])   • Mixed hyperlipidemia   • Closed fracture of left hip (CMS/[facility])   • Cellulitis   • Atrial fibrillation with rapid ventricular response (CMS/[facility])   • Bilateral lower extremity venous stasis ulcers (CMS/[facility])   • Hyponatremia suspect secondary to alcohol use and hydrochlorothiazide   • Acute on chronic congestive heart failure (CMS/[facility])     Past Medical History:   Diagnosis Date   • Abnormal EKG    • Acute on chronic congestive heart failure (CMS/[facility]) 2021   • Alcohol abuse    • Arthritis    • Atrial fibrillation with rapid ventricular response (CMS/[facility]) 2021   • B12 deficiency    • Cellulitis    • Coronary artery disease    • Depression    • Gout    • Gout    • HTN (hypertension)    • Hyperlipidemia    • Hypokalemia    • Hyponatremia    • Mixed hyperlipidemia    • Myocardial infarction (CMS/[facility])    • Neuropathy    • On home oxygen therapy    • RLS (restless legs syndrome)    • RLS (restless legs syndrome)      Past Surgical History:   Procedure Laterality Date   • ARM TENDON REPAIR     • CARDIAC CATHETERIZATION N/A 10/19/2018    Procedure: Left Heart Cath;   Surgeon: Antonino Joshua MD;  Location:  [facility] CATH INVASIVE LOCATION;  Service: Cardiovascular   • CORONARY STENT PLACEMENT     • EYE SURGERY Left    • HIP HEMIARTHROPLASTY Left 7/28/2020    Procedure: HIP HEMIARTHROPLASTY;  Surgeon: Adi Figueredo MD;  Location:  [facility] OR;  Service: Orthopedics;  Laterality: Left;            OT ASSESSMENT FLOWSHEET (last 12 hours)      OT Evaluation and Treatment     Row Name 03/02/21 0826                   OT Time and Intention    Subjective Information  complains of;weakness;fatigue;dyspnea  -        Document Type  therapy note (daily note)  -        Mode of Treatment  occupational therapy  -        Patient Effort  good  -           General Information    Existing Precautions/Restrictions  fall;oxygen therapy device and L/min  -           Pain Assessment    Additional Documentation  Pain Scale: Word Pre/Post-Treatment (Group)  -           Pain Scale: Numbers Pre/Post-Treatment    Pretreatment Pain Rating  0/10 - no pain  -        Posttreatment Pain Rating  0/10 - no pain  -        Pain Intervention(s)  Repositioned;Rest  -           Bed Mobility    Comment (Bed Mobility)  up in chair!   -           Motor Skills    Motor Skills  therapeutic exercise  -        Therapeutic Exercise  shoulder;elbow/forearm  -           Shoulder (Therapeutic Exercise)    Shoulder (Therapeutic Exercise)  AROM (active range of motion);strengthening exercise  -        Shoulder AROM (Therapeutic Exercise)  bilateral;flexion;extension;sitting;10 repetitions  -        Shoulder Strengthening (Therapeutic Exercise)  bilateral;flexion;extension;sitting;2 lb free weight;3 lb free weight  -           Elbow/Forearm (Therapeutic Exercise)    Elbow/Forearm (Therapeutic Exercise)  AROM (active range of motion);strengthening exercise  -        Elbow/Forearm AROM (Therapeutic Exercise)  bilateral;flexion;extension;sitting;10 repetitions;2 sets  -        Elbow/Forearm  Strengthening (Therapeutic Exercise)  bilateral;flexion;extension;sitting;2 lb free weight;3 lb free weight  -CJ           Wound 01/25/21 2215 Right lateral knee    Wound - Properties Group Placement Date: 01/25/21 -BJ Placement Time: 2215 -BJ Present on Hospital Admission: Y  -BJ Side: Right  -BJ Orientation: lateral  -BJ, left lateral  Location: knee  -BJ    Retired Wound - Properties Group Date first assessed: 01/25/21 -BJ Time first assessed: 2215 -BJ Present on Hospital Admission: Y  -BJ Side: Right  -BJ Location: knee  -BJ       Wound 01/25/21 2221 Right distal leg    Wound - Properties Group Placement Date: 01/25/21  -BJ Placement Time: 2221 -BJ Present on Hospital Admission: Y  -BJ Side: Right  -BJ Orientation: distal  -BJ Location: leg  -BJ    Retired Wound - Properties Group Date first assessed: 01/25/21 -BJ Time first assessed: 2221 -BJ Present on Hospital Admission: Y  -BJ Side: Right  -BJ Location: leg  -BJ       Wound 01/25/21 2222 Left ankle    Wound - Properties Group Placement Date: 01/25/21  -BJ Placement Time: 2222 -BJ Present on Hospital Admission: Y  -BJ Side: Left  -BJ Location: ankle  -BJ    Retired Wound - Properties Group Date first assessed: 01/25/21 -BJ Time first assessed: 2222 -BJ Present on Hospital Admission: Y  -BJ Side: Left  -BJ Location: ankle  -BJ       Wound 01/25/21 2222 Left lateral leg    Wound - Properties Group Placement Date: 01/25/21  -BJ Placement Time: 2222 -BJ Present on Hospital Admission: Y  -BJ Side: Left  -BJ Orientation: lateral  -BJ Location: leg  -BJ    Retired Wound - Properties Group Date first assessed: 01/25/21 -BJ Time first assessed: 2222 -BJ Present on Hospital Admission: Y  -BJ Side: Left  -BJ Location: leg  -BJ       Wound 02/25/21 0340 Left heel    Wound - Properties Group Placement Date: 02/25/21  -BF Placement Time: 0340 -BF Present on Hospital Admission: Y  -BF Side: Left  -BF Location: heel  -BF Stage, Pressure Injury : Stage 1  -BF     Retired Wound - Properties Group Date first assessed: 02/25/21  -BF Time first assessed: 0340  -BF Present on Hospital Admission: Y  -BF Side: Left  -BF Location: heel  -BF       Positioning and Restraints    Pre-Treatment Position  sitting in chair/recliner  -CJ        Post Treatment Position  chair  -CJ        In Chair  sitting;call light within reach;encouraged to call for assist  -CJ           Progress Summary (OT)    Progress Toward Functional Goals (OT)  progress toward functional goals is good  -CJ        Barriers to Overall Progress (OT)  Fall/o2!  -CJ        Impairments Still Limiting Function (OT)  continue with ot poc!  -CJ          User Key  (r) = Recorded By, (t) = Taken By, (c) = Cosigned By    Initials Name Effective Dates    CJ Henrry Ferrara COTA/JEEVAN 08/02/16 -     BF Janna Kendrick RN 04/02/18 -     BJ Janna Cardona RN 09/02/20 -          Occupational Therapy Education                 Title: PT OT SLP Therapies (In Progress)     Topic: Occupational Therapy (Done)     Point: ADL training (Done)     Description:   Instruct learner(s) on proper safety adaptation and remediation techniques during self care or transfers.   Instruct in proper use of assistive devices.              Learning Progress Summary           Patient Acceptance, E, VU by AMISH at 3/1/2021 1143                   Point: Home exercise program (Done)     Description:   Instruct learner(s) on appropriate technique for monitoring, assisting and/or progressing therapeutic exercises/activities.              Learning Progress Summary           Patient Acceptance, E,TB, VU,DU,NR by  at 3/2/2021 0826    Comment: Pt. performed ue exs while seated in chair!                   Point: Precautions (Done)     Description:   Instruct learner(s) on prescribed precautions during self-care and functional transfers.              Learning Progress Summary           Patient Acceptance, E,TB, VU,DU,NR by  at 3/2/2021 0826    Comment: Pt.  performed ue exs while seated in chair!    Acceptance, E, VU by  at 3/1/2021 1143                   Point: Body mechanics (Done)     Description:   Instruct learner(s) on proper positioning and spine alignment during self-care, functional mobility activities and/or exercises.              Learning Progress Summary           Patient Acceptance, E,TB, VU,DU,NR by  at 3/2/2021 0826    Comment: Pt. performed ue exs while seated in chair!    Acceptance, E, VU by  at 3/1/2021 1143                               User Key     Initials Effective Dates Name Provider Type Discipline     08/02/16 -  Henrry Ferrara COTA/L Occupational Therapy Assistant OT     12/04/20 -  Stacy Giraldo OTR/L Occupational Therapist OT                  OT Recommendation and Plan     Progress Toward Functional Goals (OT): progress toward functional goals is good  Plan of Care Review  Plan of Care Reviewed With: patient  Progress: improving  Outcome Summary: Pt. sitting in chair, completed 2 sets x 20 reps ue exs with several rests of 1-2 min. ea. to prevent sob/fatigue!  Plan of Care Reviewed With: patient  Outcome Summary: Pt. sitting in chair, completed 2 sets x 20 reps ue exs with several rests of 1-2 min. ea. to prevent sob/fatigue!    Outcome Measures     Row Name 03/02/21 0826             How much help from another is currently needed...    Putting on and taking off regular lower body clothing?  2  -CJ      Bathing (including washing, rinsing, and drying)  2  -CJ      Toileting (which includes using toilet bed pan or urinal)  3  -CJ      Putting on and taking off regular upper body clothing  4  -CJ      Taking care of personal grooming (such as brushing teeth)  4  -CJ      Eating meals  4  -CJ      AM-PAC 6 Clicks Score (OT)  19  -CJ         Functional Assessment    Outcome Measure Options  AM-PAC 6 Clicks Daily Activity (OT)  -        User Key  (r) = Recorded By, (t) = Taken By, (c) = Cosigned By    Initials Name  Provider Type    Henrry Blakely COTA/L Occupational Therapy Assistant          Time Calculation:   Time Calculation- OT     Row Name 03/02/21 0826             Time Calculation- OT    OT Start Time  0826  -      OT Stop Time  0853  -      OT Time Calculation (min)  27 min  -      Total Timed Code Minutes- OT  27 minute(s)  -      TCU Minutes- OT  27 min  -      OT Received On  03/02/21  -         Timed Charges    28573 - OT Therapeutic Exercise Minutes  27  -        User Key  (r) = Recorded By, (t) = Taken By, (c) = Cosigned By    Initials Name Provider Type    Henrry Blakely COTA/L Occupational Therapy Assistant        Therapy Charges for Today     Code Description Service Date Service Provider Modifiers Qty    11089830280 HC OT THER PROC EA 15 MIN 3/2/2021 Henrry Ferrara COTA/L GO 2               SONIA Albright  3/2/2021

## 2021-03-02 NOTE — THERAPY TREATMENT NOTE
Acute Care - Physical Therapy Treatment Note  [facility]     Patient Name: Hernan Su  : 1940  MRN: [id number]  Today's Date: 3/2/2021           PT Assessment (last 12 hours)      PT Evaluation and Treatment     Row Name 21 1114          Physical Therapy Time and Intention    Subjective Information  complains of;weakness;pain  -AE     Document Type  therapy note (daily note)  -AE     Mode of Treatment  physical therapy  -AE     Row Name 21 1114          General Information    Existing Precautions/Restrictions  fall;oxygen therapy device and L/min  -AE     Row Name 21 1114          Pain Scale: Numbers Pre/Post-Treatment    Pretreatment Pain Rating  5/10  -AE     Posttreatment Pain Rating  5/10  -AE     Pain Location - Orientation  generalized hips and knes  -AE     Row Name 21 1114          Bed Mobility    Comment (Bed Mobility)  up in chair  -AE     Row Name 21 1114          Transfers    Sit-Stand [place] (Transfers)  contact guard;minimum assist (75% patient effort)  -AE     [place] Level (Toilet Transfer)  minimum assist (75% patient effort);verbal cues  -AE     Row Name 21 1114          Gait/Stairs (Locomotion)    [place] Level (Gait)  contact guard;standby assist  -AE     Assistive Device (Gait)  walker, front-wheeled  -AE     Distance in Feet (Gait)  30 x 3 reps in room  -AE     Deviations/Abnormal Patterns (Gait)  gait speed decreased  -AE     Bilateral Gait Deviations  forward flexed posture  -AE     Row Name             Wound 21 Right lateral knee    Wound - Properties Group Placement Date: 21 Placement Time: 2215 Present on Hospital Admission: Y  -BJ Side: Right  -BJ Orientation: lateral  -BJ, left lateral  Location: knee  -BJ    Retired Wound - Properties Group Date first assessed: 21 Time first assessed: 2215 Present on Hospital Admission: Y  -BJ Side: Right  -BJ Location: knee  -BJ    Row Name              Wound 01/25/21 2221 Right distal leg    Wound - Properties Group Placement Date: 01/25/21  -BJ Placement Time: 2221  -BJ Present on Hospital Admission: Y  -BJ Side: Right  -BJ Orientation: distal  -BJ Location: leg  -BJ    Retired Wound - Properties Group Date first assessed: 01/25/21  -BJ Time first assessed: 2221  -BJ Present on Hospital Admission: Y  -BJ Side: Right  -BJ Location: leg  -BJ    Row Name             Wound 01/25/21 2222 Left ankle    Wound - Properties Group Placement Date: 01/25/21  -BJ Placement Time: 2222  -BJ Present on Hospital Admission: Y  -BJ Side: Left  -BJ Location: ankle  -BJ    Retired Wound - Properties Group Date first assessed: 01/25/21  -BJ Time first assessed: 2222 -BJ Present on Hospital Admission: Y  -BJ Side: Left  -BJ Location: ankle  -BJ    Row Name             Wound 01/25/21 2222 Left lateral leg    Wound - Properties Group Placement Date: 01/25/21  -BJ Placement Time: 2222 -BJ Present on Hospital Admission: Y  -BJ Side: Left  -BJ Orientation: lateral  -BJ Location: leg  -BJ    Retired Wound - Properties Group Date first assessed: 01/25/21  -BJ Time first assessed: 2222  -BJ Present on Hospital Admission: Y  -BJ Side: Left  -BJ Location: leg  -BJ    Row Name             Wound 02/25/21 0340 Left heel    Wound - Properties Group Placement Date: 02/25/21  -BF Placement Time: 0340  -BF Present on Hospital Admission: Y  -BF Side: Left  -BF Location: heel  -BF Stage, Pressure Injury : Stage 1  -BF    Retired Wound - Properties Group Date first assessed: 02/25/21  -BF Time first assessed: 0340  -BF Present on Hospital Admission: Y  -BF Side: Left  -BF Location: heel  -BF    Row Name 03/02/21 1114          Vital Signs    Pre SpO2 (%)  91  -AE     O2 Delivery Pre Treatment  room air  -AE     O2 Delivery Intra Treatment  room air  -AE     Post SpO2 (%)  96  -AE     O2 Delivery Post Treatment  room air  -AE     Row Name 03/02/21 1114          Positioning and Restraints     Pre-Treatment Position  sitting in chair/recliner  -AE     Post Treatment Position  chair  -AE     In Chair  sitting;call light within reach  -AE       User Key  (r) = Recorded By, (t) = Taken By, (c) = Cosigned By    Initials Name Provider Type    AE Tere Prieto, PTA Physical Therapy Assistant    Janna Spears, RN Registered Nurse    Janna Segura, RN Registered Nurse        Physical Therapy Education                 Title: PT OT SLP Therapies (In Progress)     Topic: Physical Therapy (In Progress)     Point: Mobility training (Done)     Learning Progress Summary           Patient Acceptance, E, VU by  at 3/1/2021 1108    Comment: Pt educated on proper mechanics for sit to stand and proper use of walker during ambulation                   Point: Home exercise program (Not Started)     Learner Progress:  Not documented in this visit.          Point: Body mechanics (Done)     Learning Progress Summary           Patient Acceptance, E, VU by  at 3/1/2021 1108    Comment: Pt educated on proper mechanics for sit to stand and proper use of walker during ambulation                   Point: Precautions (Not Started)     Learner Progress:  Not documented in this visit.                      User Key     Initials Effective Dates Name Provider Type Discipline     01/19/21 -  Rambo Hernandez, PT Student PT Student PT              PT Recommendation and Plan     Plan of Care Reviewed With: patient  Progress: improving  Outcome Summary: Pt up in chair on room air 02 at 91%. Pt was min x 1 to stand and walked 30ft x 3 reps cga with RW. Pt's post 02 96%. Pt would benefit from continued PT.  Outcome Measures     Row Name 03/02/21 0826             How much help from another is currently needed...    Putting on and taking off regular lower body clothing?  2  -CJ      Bathing (including washing, rinsing, and drying)  2  -CJ      Toileting (which includes using toilet bed pan or urinal)  3  -CJ      Putting on and  taking off regular upper body clothing  4  -CJ      Taking care of personal grooming (such as brushing teeth)  4  -CJ      Eating meals  4  -CJ      AM-PAC 6 Clicks Score (OT)  19  -CJ         Functional Assessment    Outcome Measure Options  AM-PAC 6 Clicks Daily Activity (OT)  -CJ        User Key  (r) = Recorded By, (t) = Taken By, (c) = Cosigned By    Initials Name Provider Type     Henrry Ferrara COTA/L Occupational Therapy Assistant           Time Calculation:   PT Charges     Row Name 03/02/21 1148             Time Calculation    Start Time  1114  -AE      Stop Time  1137  -AE      Time Calculation (min)  23 min  -AE      PT Received On  03/02/21  -AE      PT Goal Re-Cert Due Date  03/11/21  -AE         Time Calculation- PT    Total Timed Code Minutes- PT  23 minute(s)  -AE         Timed Charges    20563 - Gait Training Minutes   23  -AE        User Key  (r) = Recorded By, (t) = Taken By, (c) = Cosigned By    Initials Name Provider Type    AE Tere Prieto PTA Physical Therapy Assistant        Therapy Charges for Today     Code Description Service Date Service Provider Modifiers Qty    80692368660 HC GAIT TRAINING EA 15 MIN 3/2/2021 Tere Prieto PTA GP 2          PT G-Codes  Outcome Measure Options: AM-PAC 6 Clicks Daily Activity (OT)  AM-PAC 6 Clicks Score (PT): 19  AM-PAC 6 Clicks Score (OT): 19    Tere Prieto PTA  3/2/2021

## 2021-03-02 NOTE — PROGRESS NOTES
Continued Stay Note   [facility]     Patient Name: Hernan Su  MRN: [id number]  Today's Date: 3/2/2021    Admit Date: 2/24/2021    Discharge Plan     Row Name 03/02/21 0817       Plan    Plan  [place]    Patient/Family in Agreement with Plan  yes    Plan Comments  SW has left a message for Lorin in admissions to check on precert.        Discharge Codes    No documentation.             COLT Lazar

## 2021-03-02 NOTE — PROGRESS NOTES
"    [facility] Medicine Services  INPATIENT PROGRESS NOTE    Patient Name: Hernan Su  Date of Admission: 2/24/2021  Today's Date: 03/02/21  Length of Stay: 6  Primary Care Physician: Nataly Faria APRN    Subjective   Chief Complaint: f/u  HPI   \"I don't know.\"  No new complaint   states he could have spoken to me if he speaks English.  I'm not sure why he said that then referred to book he is reading which he said was English.  When I looked at his book, it was Western story (cowboys)  Did a lot of walking today he said.    Review of Systems     All pertinent negatives and positives are as above. All other systems have been reviewed and are negative unless otherwise stated.     Objective    Temp:  [97.2 °F (36.2 °C)-98 °F (36.7 °C)] 97.9 °F (36.6 °C)  Heart Rate:  [84-99] 95  Resp:  [18] 18  BP: (102-135)/(71-82) 105/77  Physical Exam    He is confuse.  Nurse Iris is at bedside  GEN: Awake, alert, interactive, in NAD, appears frail and chronically ill  HEENT:  PERRLA, EOMI, Anicteric, Trachea midline  Lungs:  still diminished at R base but breath sounds improving overall, no wheezes  Heart: irreg/irreg, +S1/s2, no rub telemetry showed atrial fibrillation rate controlled at 83-88  ABD: soft, nt/nd, +BS, no guarding/rebound  Extremities: Ankle edema, no erythema  Skin: b/l LE wounds and scabs, no drainage. This has dressing currently except for the more proximal on left leg.  It looks clean, shallow wound that is healing   Neuro: AAOx 1-2, no focal deficits  Psych: normal mood & affect                                        Results Review:  I have reviewed the labs, radiology results, and diagnostic studies.    Laboratory Data:   Results from last 7 days   Lab Units 02/25/21  0402 02/24/21  1110   WBC 10*3/mm3 5.90 7.61   HEMOGLOBIN g/dL 13.3 13.4   HEMATOCRIT % 40.4 40.5   PLATELETS 10*3/mm3 235 247        Results from last 7 days   Lab Units 03/01/21  0416 " 02/28/21  0536 02/27/21  0307  02/24/21  1110   SODIUM mmol/L 137 136 136   < > 138   POTASSIUM mmol/L 3.7 3.7 3.2*   < > 3.8   CHLORIDE mmol/L 98 96* 93*   < > 94*   CO2 mmol/L 32.0* 34.0* 37.0*   < > 37.0*   BUN mg/dL 18 17 14   < > 13   CREATININE mg/dL 0.92 0.87 0.77   < > 0.71*   CALCIUM mg/dL 9.8 9.6 9.3   < > 9.8   BILIRUBIN mg/dL  --   --   --   --  0.9   ALK PHOS U/L  --   --   --   --  188*   ALT (SGPT) U/L  --   --   --   --  33   AST (SGOT) U/L  --   --   --   --  36   GLUCOSE mg/dL 93 91 103*   < > 111*    < > = values in this interval not displayed.       Culture Data:   Blood Culture   Date Value Ref Range Status   02/24/2021 No growth at 5 days  Final   02/24/2021 No growth at 5 days  Final       Radiology Data:   Imaging Results (Last 24 Hours)     ** No results found for the last 24 hours. **          I have reviewed the patient's current medications.     Assessment/Plan     Active Hospital Problems    Diagnosis   • **Atrial fibrillation with rapid ventricular response (CMS/HCC)   • Acute on chronic congestive heart failure (CMS/HCC)   • Bilateral lower extremity venous stasis ulcers (CMS/HCC)   • Essential hypertension   • Gout       Problem list  Atrial fibrillation with RVR  Acute on chronic combined heart failure  Large right pleural effusion felt secondary to heart failure  Hypokalemia resolved  Essential hypertension  Gout  Coronary artery disease     Plan  Awaiting pre-CERT  Continue physical rehabilitation  Noted that he is on full dose Lovenox but reportedly was not on any anticoagulation prior to this admission due to fall risk.  Dr. Medina felt that he is not a good candidate for blood thinners.  He had discussed this with daughter who agreed.   cont wound care  Cont present management    allopurinol, 300 mg, Oral, Daily  amitriptyline, 25 mg, Oral, Nightly  aspirin, 81 mg, Oral, Daily  dilTIAZem CD, 360 mg, Oral, Q24H  enoxaparin, 1 mg/kg, Subcutaneous, Q12H  furosemide, 40 mg, Oral,  Daily  gabapentin, 100 mg, Oral, Q8H  sodium chloride, 10 mL, Intravenous, Q12H                  Discharge Planning:?  Electronically signed by Hernan Barbosa MD, 03/02/21, 14:27 CST.

## 2021-03-02 NOTE — PLAN OF CARE
Problem: Adult Inpatient Plan of Care  Goal: Plan of Care Review  Outcome: Ongoing, Progressing  Flowsheets (Taken 3/2/2021 0865)  Progress: improving  Plan of Care Reviewed With: patient  Outcome Summary: Pt. sitting in chair, completed 2 sets x 20 reps ue exs with several rests of 1-2 min. ea. to prevent sob/fatigue!   Goal Outcome Evaluation:  Plan of Care Reviewed With: patient  Progress: improving  Outcome Summary: Pt. sitting in chair, completed 2 sets x 20 reps ue exs with several rests of 1-2 min. ea. to prevent sob/fatigue!

## 2021-03-02 NOTE — PLAN OF CARE
Goal Outcome Evaluation:  Plan of Care Reviewed With: patient  Progress: improving  Outcome Summary: Pt up in chair on room air 02 at 91%. Pt was min x 1 to stand and walked 30ft x 3 reps cga with RW. Pt's post 02 96%. Pt would benefit from continued PT.

## 2021-03-02 NOTE — PROGRESS NOTES
"1           [facility] Medicine Services  INPATIENT PROGRESS NOTE    Patient Name: Hernan Su  Date of Admission: 2/24/2021  Today's Date: 03/01/21  Length of Stay: 5  Primary Care Physician: Nataly Faria APRN    Subjective   Chief Complaint: Follow-up  HPI    had sent records for precertification.    Review of record indicates that he was admitted for atrial fibrillation with rapid ventricular response.  In addition to this he was with acute on chronic combined heart failure and has large right pleural effusion.  His previous echo showed EF is 36 to 40% and has grade 1 diastolic dysfunction.   Most recent echocardiogram as documented below    Results for orders placed during the hospital encounter of 02/24/21   Adult Transthoracic Echo Complete W/ Cont if Necessary Per Protocol    Narrative · Left ventricular systolic function is moderately decreased. Estimated   left ventricular EF = 35%.  · The left ventricular cavity is mildly dilated.  · Left ventricular wall thickness is consistent with mild concentric   hypertrophy.  · Mild mitral valve regurgitation is present.  · Mild tricuspid valve regurgitation is present. Estimated right   ventricular systolic pressure from tricuspid regurgitation is markedly   elevated (>55 mmHg).        As to the right pleural effusion, this was reportedly improving with diuresis.  He is hemodynamically stable  O2 saturation is 99%  Patient is a very poor historian  \"I do not know what to do.\"  He states that he has a \"gob\" that has been fluctuating as he points to his throat    Review of Systems   Limited due to patient's mental status which appears to me confused  He has not complained of any difficulty breathing or shortness of breath or chest pain during encounter    Objective    Temp:  [97.2 °F (36.2 °C)-98 °F (36.7 °C)] 97.2 °F (36.2 °C)  Heart Rate:  [70-86] 86  Resp:  [16-18] 18  BP: (102-131)/(67-96) 102/71  Physical " Exam  GEN: Awake, alert, interactive, in NAD, appears frail and chronically ill  HEENT:  PERRLA, EOMI, Anicteric, Trachea midline  Lungs:  still diminished at R base but breath sounds improving overall, no wheezes  Heart: irreg/irreg, +S1/s2, no rub telemetry showed atrial fibrillation rate controlled at 83-88  ABD: soft, nt/nd, +BS, no guarding/rebound  Extremities: Ankle edema, no erythema  Skin: b/l LE wounds and scabs, no drainage   Neuro: AAOx 1-2, no focal deficits  Psych: normal mood & affect    Results Review:  I have reviewed the labs, radiology results, and diagnostic studies.    Laboratory Data:   Results from last 7 days   Lab Units 02/25/21  0402 02/24/21  1110   WBC 10*3/mm3 5.90 7.61   HEMOGLOBIN g/dL 13.3 13.4   HEMATOCRIT % 40.4 40.5   PLATELETS 10*3/mm3 235 247        Results from last 7 days   Lab Units 03/01/21  0416 02/28/21  0536 02/27/21  0307  02/24/21  1110   SODIUM mmol/L 137 136 136   < > 138   POTASSIUM mmol/L 3.7 3.7 3.2*   < > 3.8   CHLORIDE mmol/L 98 96* 93*   < > 94*   CO2 mmol/L 32.0* 34.0* 37.0*   < > 37.0*   BUN mg/dL 18 17 14   < > 13   CREATININE mg/dL 0.92 0.87 0.77   < > 0.71*   CALCIUM mg/dL 9.8 9.6 9.3   < > 9.8   BILIRUBIN mg/dL  --   --   --   --  0.9   ALK PHOS U/L  --   --   --   --  188*   ALT (SGPT) U/L  --   --   --   --  33   AST (SGOT) U/L  --   --   --   --  36   GLUCOSE mg/dL 93 91 103*   < > 111*    < > = values in this interval not displayed.       Culture Data:   Blood Culture   Date Value Ref Range Status   02/24/2021 No growth at 5 days  Final   02/24/2021 No growth at 5 days  Final       Radiology Data:   Imaging Results (Last 24 Hours)     ** No results found for the last 24 hours. **          I have reviewed the patient's current medications.     Assessment/Plan     Active Hospital Problems    Diagnosis   • **Atrial fibrillation with rapid ventricular response (CMS/HCC)   • Acute on chronic congestive heart failure (CMS/HCC)   • Bilateral lower extremity  venous stasis ulcers (CMS/HCC)   • Essential hypertension   • Gout           Problem list  Atrial fibrillation with RVR  Acute on chronic combined heart failure  Large right pleural effusion felt secondary to heart failure  Hypokalemia resolved  Essential hypertension  Gout  Coronary artery disease    Plan  Awaiting pre-CERT  Continue physical rehabilitation  Lab holiday  Noted that he is on full dose Lovenox but reportedly was not on any anticoagulation prior to this admission due to fall risk.  Dr. Medina felt that he is not a good candidate for blood thinners.  He had discussed this with daughter who agreed.      allopurinol, 300 mg, Oral, Daily  amitriptyline, 25 mg, Oral, Nightly  aspirin, 81 mg, Oral, Daily  dilTIAZem CD, 360 mg, Oral, Q24H  enoxaparin, 1 mg/kg, Subcutaneous, Q12H  furosemide, 40 mg, Oral, Daily  gabapentin, 100 mg, Oral, Q8H  sodium chloride, 10 mL, Intravenous, Q12H                Discharge Planning: ?  Awaiting pre-CERT  Electronically signed by Hernan Barbosa MD, 03/01/21, 18:03 CST.

## 2021-03-02 NOTE — PLAN OF CARE
Problem: Adult Inpatient Plan of Care  Goal: Plan of Care Review  Outcome: Ongoing, Progressing  Flowsheets (Taken 3/2/2021 3788)  Progress: no change  Plan of Care Reviewed With: patient  Outcome Summary: Pt has had no complaints of pain throughout the night, rested between care. He does have some intermittent confusion, however easily reoriented. VSS, possible d/c back to [facility] today. Aflutter 75-99 on tele, will update MD as needed

## 2021-03-02 NOTE — PLAN OF CARE
"Goal Outcome Evaluation:  Plan of Care Reviewed With: patient  Progress: improving  Outcome Summary: Pt tells me his appetite is \"still going up\" Isn't able to specify if any particular foods he'd like to have. Is agreeable to Boost Plus BID. Encouraged intake. Will continue to follow.  "

## 2021-03-02 NOTE — PAYOR COMM NOTE
"3/2/21 [facility] [phone]  [phone]    NURY ZAMORA PROGRESS NOTES FROM 3/1/21. HAD NOT SEEN PATIENT YESTERDAY.  UPDATE LABS.          VernaJm penn (80 y.o. Male)     Date of Birth Social Security Number Address Home Phone MRN    1940  [address] [address] [phone] [id number]    [employer] Marital Status          Che[employer]ism        Admission Date Admission Type Admitting Provider Attending Provider Department, Room/Bed    2/24/21 Emergency Jm Barbosa MD [place], Glenn Riego, MD [facility] 4B, 402/1    Discharge Date Discharge Disposition Discharge Destination                       Attending Provider: Jm Barbosa MD    Allergies: Shellfish-derived Products    Isolation: None   Infection: None   Code Status: CPR    Ht: 177.8 cm (70\")   Wt: 62.1 kg (137 lb)    Admission Cmt: None   Principal Problem: Atrial fibrillation with rapid ventricular response (CMS/HCC) [I48.91]                 Active Insurance as of 2/24/2021     Primary Coverage     Payor Plan Insurance Group Employer/Plan Group    [facility]IC[facility]PLAC[facility]      Payor Plan Address Payor Plan Phone Number Payor Plan Fax Number Effective Dates    [address] [address] [phone]  7/28/2020 - None Entered    [facility] [address]       Subscriber Name Subscriber Birth Date Member ID       JM MCNEIL 1940 [id number]                 Emergency Contacts      (Rel.) Home Phone Work Phone Mobile Phone    Bella Vieyra (Significant Other) [phone] -- --    Sumanth Mcneil (Son) [phone] -- --        Encounter Information     Department Encounter #   2/24/2021 10:34 AM [address] [id number]   Jm Barbosa MD   Physician   Medicine   Progress Notes   Signed   Date of Service:  03/01/21 1803   Creation Time:  03/01/21 1803            Signed             Show:Clear " "all  [x]Manual[x]Template[x]Copied    Added by:  [x]Hernan Barbosa MD    []Fifi for details  1                                                                 [facility] Medicine Services  INPATIENT PROGRESS NOTE     Patient Name: Hernan Su  Date of Admission: 2/24/2021  Today's Date: 03/01/21  Length of Stay: 5  Primary Care Physician: Nataly Faria APRN     Subjective   Chief Complaint: Follow-up  HPI    had sent records for precertification.     Review of record indicates that he was admitted for atrial fibrillation with rapid ventricular response.  In addition to this he was with acute on chronic combined heart failure and has large right pleural effusion.  His previous echo showed EF is 36 to 40% and has grade 1 diastolic dysfunction.   Most recent echocardiogram as documented below          Results for orders placed during the hospital encounter of 02/24/21   Adult Transthoracic Echo Complete W/ Cont if Necessary Per Protocol     Narrative · Left ventricular systolic function is moderately decreased. Estimated   left ventricular EF = 35%.  · The left ventricular cavity is mildly dilated.  · Left ventricular wall thickness is consistent with mild concentric   hypertrophy.  · Mild mitral valve regurgitation is present.  · Mild tricuspid valve regurgitation is present. Estimated right   ventricular systolic pressure from tricuspid regurgitation is markedly   elevated (>55 mmHg).                       As to the right pleural effusion, this was reportedly improving with diuresis.  He is hemodynamically stable  O2 saturation is 99%  Patient is a very poor historian  \"I do not know what to do.\"  He states that he has a \"gob\" that has been fluctuating as he points to his throat     Review of Systems   Limited due to patient's mental status which appears to me confused  He has not complained of any difficulty breathing or shortness of breath or " chest pain during encounter     Objective    Temp:  [97.2 °F (36.2 °C)-98 °F (36.7 °C)] 97.2 °F (36.2 °C)  Heart Rate:  [70-86] 86  Resp:  [16-18] 18  BP: (102-131)/(67-96) 102/71  Physical Exam  GEN: Awake, alert, interactive, in NAD, appears frail and chronically ill  HEENT:  PERRLA, EOMI, Anicteric, Trachea midline  Lungs:  still diminished at R base but breath sounds improving overall, no wheezes  Heart: irreg/irreg, +S1/s2, no rub telemetry showed atrial fibrillation rate controlled at 83-88  ABD: soft, nt/nd, +BS, no guarding/rebound  Extremities: Ankle edema, no erythema  Skin: b/l LE wounds and scabs, no drainage   Neuro: AAOx 1-2, no focal deficits  Psych: normal mood & affect         Results from last 7 days   Lab Units 03/01/21  0416 02/28/21  0536 02/27/21  0307   02/24/21  1110   SODIUM mmol/L 137 136 136   < > 138   POTASSIUM mmol/L 3.7 3.7 3.2*   < > 3.8   CHLORIDE mmol/L 98 96* 93*   < > 94*   CO2 mmol/L 32.0* 34.0* 37.0*   < > 37.0*   BUN mg/dL 18 17 14   < > 13   CREATININE mg/dL 0.92 0.87 0.77   < > 0.71*   CALCIUM mg/dL 9.8 9.6 9.3   < > 9.8   BILIRUBIN mg/dL  --   --   --   --  0.9   ALK PHOS U/L  --   --   --   --  188*   ALT (SGPT) U/L  --   --   --   --  33   AST (SGOT) U/L  --   --   --   --  36   GLUCOSE mg/dL 93 91 103*   < > 111*    < > = values in this interval not displayed.           Active Hospital Problems     Diagnosis   • **Atrial fibrillation with rapid ventricular response (CMS/HCC)   • Acute on chronic congestive heart failure (CMS/HCC)   • Bilateral lower extremity venous stasis ulcers (CMS/HCC)   • Essential hypertension   • Gout               Problem list  Atrial fibrillation with RVR  Acute on chronic combined heart failure  Large right pleural effusion felt secondary to heart failure  Hypokalemia resolved  Essential hypertension  Gout  Coronary artery disease     Plan  Awaiting pre-CERT  Continue physical rehabilitation  Lab holiday  Noted that he is on full dose Lovenox  but reportedly was not on any anticoagulation prior to this admission due to fall risk.  Dr. Medina felt that he is not a good candidate for blood thinners.  He had discussed this with daughter who agreed.        Metabolic Panel  Order: [id number]  Status:  Final result   Visible to patient:  No (not released) Next appt:  None  Specimen Information: Blood        Component   Ref Range & Units 1d ago 2d ago 3d ago 4d ago 5d ago 6d ago   Glucose   65 - 99 mg/dL 93  91  103High   95  97  111High     BUN   8 - 23 mg/dL 18  17  14  14  11  13    Creatinine   0.76 - 1.27 mg/dL 0.92  0.87  0.77  0.74Low   0.68Low   0.71Low     Sodium   136 - 145 mmol/L 137  136  136  139  140  138    Potassium   3.5 - 5.2 mmol/L 3.7  3.7  3.2Low   3.9  3.1Low   3.8    Chloride   98 - 107 mmol/L 98  96Low   93Low   94Low   93Low   94Low     CO2   22.0 - 29.0 mmol/L 32.0High   34.0High   37.0High   40.0High   40.0High   37.0High     Calcium   8.6 - 10.5 mg/dL 9.8  9.6  9.3  9.4  9.8  9.8    eGFR Non African Amer   >60 mL/min/1.73 79  84  97  102  112  107    BUN/Creatinine Ratio   7.0 - 25.0 19.6  19.5  18.2  18.9  16.2  18.3    Anion Gap   5.0 - 15.0 mmol/L 7.0  6.0  6.0  5.0  7.0  7.0                 allopurinol, 300 mg, Oral, Daily  amitriptyline, 25 mg, Oral, Nightly  aspirin, 81 mg, Oral, Daily  dilTIAZem CD, 360 mg, Oral, Q24H  enoxaparin, 1 mg/kg, Subcutaneous, Q12H  furosemide, 40 mg, Oral, Daily  gabapentin, 100 mg, Oral, Q8H  sodium chloride, 10 mL, Intravenous, Q12H     Discharge Planning: ?  Awaiting pre-CERT  Electronically signed by Hernan Barbosa MD, 03/01/21, 18:03 CST.

## 2021-03-03 NOTE — PLAN OF CARE
Problem: Adult Inpatient Plan of Care  Goal: Plan of Care Review  Outcome: Ongoing, Progressing  Flowsheets (Taken 3/3/2021 0422)  Progress: no change  Plan of Care Reviewed With: patient  Outcome Summary: Pt has had no complaints of pain throughout the night, rested between care. VSS, remains on room air sats WNL. Afib 81-93 on tele. Will update MD as needed.

## 2021-03-03 NOTE — PLAN OF CARE
Goal Outcome Evaluation:  Plan of Care Reviewed With: patient  Progress: improving  Outcome Summary: Pt. sitting in chair, refuses adl shower, states that someone is going to come and help him from home! Ue exs performed to increase his act. nabeel!

## 2021-03-03 NOTE — THERAPY TREATMENT NOTE
Acute Care - Physical Therapy Treatment Note  [facility]     Patient Name: Hernan Su  : 1940  MRN: [id number]  Today's Date: 3/3/2021           PT Assessment (last 12 hours)      PT Evaluation and Treatment     Row Name 21 0840          Physical Therapy Time and Intention    Subjective Information  complains of;weakness  -AE     Document Type  therapy note (daily note)  -AE     Mode of Treatment  physical therapy  -AE     Row Name 21 0840          General Information    Existing Precautions/Restrictions  fall  -AE     Row Name 21 0840          Bed Mobility    Comment (Bed Mobility)  up in chair  -AE     Row Name 21 0840          Transfers    Sit-Stand [place] (Transfers)  standby assist;verbal cues  -AE     Row Name 21 0840          Gait/Stairs (Locomotion)    [place] Level (Gait)  contact guard  -AE     Assistive Device (Gait)  walker, front-wheeled  -AE     Distance in Feet (Gait)  70  -AE     Bilateral Gait Deviations  forward flexed posture  -AE     Row Name 21 0840          Hip (Therapeutic Exercise)    Hip (Therapeutic Exercise)  AROM (active range of motion)  -AE     Hip AROM (Therapeutic Exercise)  flexion 20 reps  -AE     Row Name 21 0840          Knee (Therapeutic Exercise)    Knee (Therapeutic Exercise)  AROM (active range of motion)  -AE     Knee AROM (Therapeutic Exercise)  LAQ (long arc quad);10 repetitions;2 sets  -AE     Row Name 21 0840          Ankle (Therapeutic Exercise)    Ankle (Therapeutic Exercise)  AROM (active range of motion)  -AE     Ankle AROM (Therapeutic Exercise)  10 repetitions;2 sets  -AE     Row Name             Wound 21 Right lateral knee    Wound - Properties Group Placement Date: 21  -BJ Placement Time: 2215 Present on Hospital Admission: Y  -BJ Side: Right  -BJ Orientation: lateral  -BJ, left lateral  Location: knee  -BJ    Retired Wound - Properties Group Date first assessed: 21   -BJ Time first assessed: 2215 -BJ Present on Hospital Admission: Y  -BJ Side: Right  -BJ Location: knee  -BJ    Row Name             Wound 01/25/21 2221 Right distal leg    Wound - Properties Group Placement Date: 01/25/21  -BJ Placement Time: 2221 -BJ Present on Hospital Admission: Y  -BJ Side: Right  -BJ Orientation: distal  -BJ Location: leg  -BJ    Retired Wound - Properties Group Date first assessed: 01/25/21 -BJ Time first assessed: 2221 -BJ Present on Hospital Admission: Y  -BJ Side: Right  -BJ Location: leg  -BJ    Row Name             Wound 01/25/21 2222 Left ankle    Wound - Properties Group Placement Date: 01/25/21  -BJ Placement Time: 2222 -BJ Present on Hospital Admission: Y  -BJ Side: Left  -BJ Location: ankle  -BJ    Retired Wound - Properties Group Date first assessed: 01/25/21 -BJ Time first assessed: 2222 -BJ Present on Hospital Admission: Y  -BJ Side: Left  -BJ Location: ankle  -BJ    Row Name             Wound 01/25/21 2222 Left lateral leg    Wound - Properties Group Placement Date: 01/25/21  -BJ Placement Time: 2222 -BJ Present on Hospital Admission: Y  -BJ Side: Left  -BJ Orientation: lateral  -BJ Location: leg  -BJ    Retired Wound - Properties Group Date first assessed: 01/25/21 -BJ Time first assessed: 2222 -BJ Present on Hospital Admission: Y  -BJ Side: Left  -BJ Location: leg  -BJ    Row Name             Wound 02/25/21 0340 Left heel    Wound - Properties Group Placement Date: 02/25/21  -BF Placement Time: 0340  -BF Present on Hospital Admission: Y  -BF Side: Left  -BF Location: heel  -BF Stage, Pressure Injury : Stage 1  -BF    Retired Wound - Properties Group Date first assessed: 02/25/21  -BF Time first assessed: 0340  -BF Present on Hospital Admission: Y  -BF Side: Left  -BF Location: heel  -BF    Row Name 03/03/21 0840          Vital Signs    O2 Delivery Pre Treatment  room air  -AE     Post SpO2 (%)  96  -AE     O2 Delivery Post Treatment  room air  -AE     Row Name  03/03/21 0840          Positioning and Restraints    Pre-Treatment Position  sitting in chair/recliner  -AE     Post Treatment Position  chair  -AE     In Chair  sitting;call light within reach;exit alarm on  -AE       User Key  (r) = Recorded By, (t) = Taken By, (c) = Cosigned By    Initials Name Provider Type    AE Tere Prieto, ANSHUL Physical Therapy Assistant    Janna Spears, RN Registered Nurse    Janna Segura, RN Registered Nurse        Physical Therapy Education                 Title: PT OT SLP Therapies (In Progress)     Topic: Physical Therapy (In Progress)     Point: Mobility training (Done)     Learning Progress Summary           Patient Acceptance, E, VU by  at 3/1/2021 1108    Comment: Pt educated on proper mechanics for sit to stand and proper use of walker during ambulation                   Point: Home exercise program (Not Started)     Learner Progress:  Not documented in this visit.          Point: Body mechanics (Done)     Learning Progress Summary           Patient Acceptance, E, VU by  at 3/1/2021 1108    Comment: Pt educated on proper mechanics for sit to stand and proper use of walker during ambulation                   Point: Precautions (Not Started)     Learner Progress:  Not documented in this visit.                      User Key     Initials Effective Dates Name Provider Type Discipline     01/19/21 -  Rambo Hernandez, PT Student PT Student PT              PT Recommendation and Plan     Plan of Care Reviewed With: patient  Progress: improving  Outcome Summary: Pt up in chair on room air 02 at 91%. Pt was min x 1 to stand and walked 30ft x 3 reps cga with RW. Pt's post 02 96%. Pt would benefit from continued PT.  Outcome Measures     Row Name 03/02/21 0826             How much help from another is currently needed...    Putting on and taking off regular lower body clothing?  2  -CJ      Bathing (including washing, rinsing, and drying)  2  -CJ      Toileting (which  includes using toilet bed pan or urinal)  3  -CJ      Putting on and taking off regular upper body clothing  4  -CJ      Taking care of personal grooming (such as brushing teeth)  4  -CJ      Eating meals  4  -CJ      AM-PAC 6 Clicks Score (OT)  19  -         Functional Assessment    Outcome Measure Options  AM-PAC 6 Clicks Daily Activity (OT)  -        User Key  (r) = Recorded By, (t) = Taken By, (c) = Cosigned By    Initials Name Provider Type     Henrry Ferrara COTA/L Occupational Therapy Assistant           Time Calculation:   PT Charges     Row Name 03/03/21 0931             Time Calculation    Start Time  0840  -AE      Stop Time  0903  -AE      Time Calculation (min)  23 min  -AE      PT Received On  03/02/21  -AE      PT Goal Re-Cert Due Date  03/11/21  -AE         Time Calculation- PT    Total Timed Code Minutes- PT  23 minute(s)  -AE         Timed Charges    44339 - PT Therapeutic Exercise Minutes  8  -AE      24213 - Gait Training Minutes   15  -AE        User Key  (r) = Recorded By, (t) = Taken By, (c) = Cosigned By    Initials Name Provider Type    AE Tere Prieto PTA Physical Therapy Assistant        Therapy Charges for Today     Code Description Service Date Service Provider Modifiers Qty    65847693216 HC GAIT TRAINING EA 15 MIN 3/2/2021 Tere Prieto PTA GP 2    41727476513 HC GAIT TRAINING EA 15 MIN 3/3/2021 Tere Prieto PTA GP 1    91569174174 HC PT THER PROC EA 15 MIN 3/3/2021 Tere Prieto PTA GP 1          PT G-Codes  Outcome Measure Options: AM-PAC 6 Clicks Daily Activity (OT)  AM-PAC 6 Clicks Score (PT): 19  AM-PAC 6 Clicks Score (OT): 19    Tere Prieto PTA  3/3/2021

## 2021-03-03 NOTE — THERAPY TREATMENT NOTE
Acute Care - Physical Therapy Treatment Note  [facility]     Patient Name: Hernan Su  : 1940  MRN: [id number]  Today's Date: 3/3/2021           PT Assessment (last 12 hours)      PT Evaluation and Treatment     Row Name 21 1341 21 0840       Physical Therapy Time and Intention    Subjective Information  no complaints  -KJ  complains of;weakness  -AE    Document Type  therapy note (daily note)  -KJ  therapy note (daily note)  -AE    Mode of Treatment  physical therapy  -KJ  physical therapy  -AE    Patient Effort  good  -KJ  --    Row Name 21 1341 21 0840       General Information    Existing Precautions/Restrictions  fall monitor sat  -KJ  fall  -AE    Row Name 21 1341          Pain Scale: Numbers Pre/Post-Treatment    Pretreatment Pain Rating  0/10 - no pain  -KJ     Posttreatment Pain Rating  0/10 - no pain  -KJ     Row Name 21 1341 21 0840       Bed Mobility    Comment (Bed Mobility)  up in chair  -KJ  up in chair  -AE    Row Name 21 1341 21 0840       Transfers    Sit-Stand [place] (Transfers)  verbal cues;standby assist  -KJ  standby assist;verbal cues  -AE    Row Name 21 1341          Sit-Stand Transfer    Assistive Device (Sit-Stand Transfers)  walker, front-wheeled  -KJ     Row Name 21 1341 21 0840       Gait/Stairs (Locomotion)    [place] Level (Gait)  verbal cues;contact guard  -KJ  contact guard  -AE    Assistive Device (Gait)  walker, front-wheeled  -KJ  walker, front-wheeled  -AE    Distance in Feet (Gait)  40' x 2  -KJ  70  -AE    Deviations/Abnormal Patterns (Gait)  gait speed decreased  -KJ  --    Bilateral Gait Deviations  forward flexed posture  -KJ  forward flexed posture  -AE    Row Name 21 1341 21 0840       Hip (Therapeutic Exercise)    Hip (Therapeutic Exercise)  AROM (active range of motion)  -KJ  AROM (active range of motion)  -AE    Hip AROM (Therapeutic Exercise)  bilateral  -KJ   flexion 20 reps  -AE    Row Name 03/03/21 1341 03/03/21 0840       Knee (Therapeutic Exercise)    Knee (Therapeutic Exercise)  AROM (active range of motion)  -KJ  AROM (active range of motion)  -AE    Knee AROM (Therapeutic Exercise)  bilateral  -KJ  LAQ (long arc quad);10 repetitions;2 sets  -AE    Row Name 03/03/21 1341 03/03/21 0840       Ankle (Therapeutic Exercise)    Ankle (Therapeutic Exercise)  AROM (active range of motion)  -KJ  AROM (active range of motion)  -AE    Ankle AROM (Therapeutic Exercise)  bilateral  -KJ  10 repetitions;2 sets  -AE    Row Name             Wound 01/25/21 2215 Right lateral knee    Wound - Properties Group Placement Date: 01/25/21 -BJ Placement Time: 2215 -BJ Present on Hospital Admission: Y  -BJ Side: Right  -BJ Orientation: lateral  -BJ, left lateral  Location: knee  -BJ    Retired Wound - Properties Group Date first assessed: 01/25/21 -BJ Time first assessed: 2215 -BJ Present on Hospital Admission: Y  -BJ Side: Right  -BJ Location: knee  -BJ    Row Name             Wound 01/25/21 2221 Right distal leg    Wound - Properties Group Placement Date: 01/25/21 -BJ Placement Time: 2221 -BJ Present on Hospital Admission: Y  -BJ Side: Right  -BJ Orientation: distal  -BJ Location: leg  -BJ    Retired Wound - Properties Group Date first assessed: 01/25/21 -BJ Time first assessed: 2221 -BJ Present on Hospital Admission: Y  -BJ Side: Right  -BJ Location: leg  -BJ    Row Name             Wound 01/25/21 2222 Left ankle    Wound - Properties Group Placement Date: 01/25/21  -BJ Placement Time: 2222 -BJ Present on Hospital Admission: Y  -BJ Side: Left  -BJ Location: ankle  -BJ    Retired Wound - Properties Group Date first assessed: 01/25/21 -BJ Time first assessed: 2222  -BJ Present on Hospital Admission: Y  -BJ Side: Left  -BJ Location: ankle  -BJ    Row Name             Wound 01/25/21 2222 Left lateral leg    Wound - Properties Group Placement Date: 01/25/21  -BJ Placement Time: 2222   -BJ Present on Hospital Admission: Y  -BJ Side: Left  -BJ Orientation: lateral  -BJ Location: leg  -BJ    Retired Wound - Properties Group Date first assessed: 01/25/21  -BJ Time first assessed: 2222 -BJ Present on Hospital Admission: Y  -BJ Side: Left  -BJ Location: leg  -BJ    Row Name             Wound 02/25/21 0340 Left heel    Wound - Properties Group Placement Date: 02/25/21  -BF Placement Time: 0340  -BF Present on Hospital Admission: Y  -BF Side: Left  -BF Location: heel  -BF Stage, Pressure Injury : Stage 1  -BF    Retired Wound - Properties Group Date first assessed: 02/25/21  -BF Time first assessed: 0340  -BF Present on Hospital Admission: Y  -BF Side: Left  -BF Location: heel  -BF    Row Name 03/03/21 0840          Vital Signs    O2 Delivery Pre Treatment  room air  -AE     Post SpO2 (%)  96  -AE     O2 Delivery Post Treatment  room air  -AE     Row Name 03/03/21 1341 03/03/21 0840       Positioning and Restraints    Pre-Treatment Position  sitting in chair/recliner  -KJ  sitting in chair/recliner  -AE    Post Treatment Position  chair  -KJ  chair  -AE    In Chair  call light within reach  -KJ  sitting;call light within reach;exit alarm on  -AE      User Key  (r) = Recorded By, (t) = Taken By, (c) = Cosigned By    Initials Name Provider Type    AE Tere Prieto, PTA Physical Therapy Assistant    Jeanette Scott PTA Physical Therapy Assistant    Janna Spears, RN Registered Nurse    Janna Segura RN Registered Nurse        Physical Therapy Education                 Title: PT OT SLP Therapies (In Progress)     Topic: Physical Therapy (In Progress)     Point: Mobility training (Done)     Learning Progress Summary           Patient Acceptance, E, VU by ANN at 3/1/2021 1108    Comment: Pt educated on proper mechanics for sit to stand and proper use of walker during ambulation                   Point: Home exercise program (Not Started)     Learner Progress:  Not documented in this visit.           Point: Body mechanics (Done)     Learning Progress Summary           Patient Acceptance, E, VU by ANN at 3/1/2021 1108    Comment: Pt educated on proper mechanics for sit to stand and proper use of walker during ambulation                   Point: Precautions (Not Started)     Learner Progress:  Not documented in this visit.                      User Key     Initials Effective Dates Name Provider Type Discipline    ANN 01/19/21 -  Rambo Hernandez, PT Student PT Student PT              PT Recommendation and Plan        Outcome Measures     Row Name 03/03/21 0752 03/02/21 0826          How much help from another is currently needed...    Putting on and taking off regular lower body clothing?  2  -CJ  2  -CJ     Bathing (including washing, rinsing, and drying)  2  -CJ  2  -CJ     Toileting (which includes using toilet bed pan or urinal)  3  -CJ  3  -CJ     Putting on and taking off regular upper body clothing  4  -CJ  4  -CJ     Taking care of personal grooming (such as brushing teeth)  4  -CJ  4  -CJ     Eating meals  4  -CJ  4  -CJ     AM-PAC 6 Clicks Score (OT)  19  -  19  -CJ        Functional Assessment    Outcome Measure Options  AM-PAC 6 Clicks Daily Activity (OT)  -CJ  AM-PAC 6 Clicks Daily Activity (OT)  -       User Key  (r) = Recorded By, (t) = Taken By, (c) = Cosigned By    Initials Name Provider Type    CJ Henrry Ferrara COTA/L Occupational Therapy Assistant           Time Calculation:   PT Charges     Row Name 03/03/21 1400 03/03/21 0931          Time Calculation    Start Time  1341  -KJ  0840  -AE     Stop Time  1404  -KJ  0903  -AE     Time Calculation (min)  23 min  -KJ  23 min  -AE     PT Received On  --  03/02/21  -AE     PT Goal Re-Cert Due Date  --  03/11/21  -AE        Time Calculation- PT    Total Timed Code Minutes- PT  23 minute(s)  -KJ  23 minute(s)  -AE        Timed Charges    57187 - PT Therapeutic Exercise Minutes  --  8  -AE     28166 - Gait Training Minutes   --  15  -AE        User Key  (r) = Recorded By, (t) = Taken By, (c) = Cosigned By    Initials Name Provider Type    AE Tere Prieto, ANSHUL Physical Therapy Assistant    Jeanette Scott PTA Physical Therapy Assistant        Therapy Charges for Today     Code Description Service Date Service Provider Modifiers Qty    09496243859 HC PT THER PROC EA 15 MIN 3/3/2021 Jeanette Chery, ANSHUL GP 1    34398090513 HC GAIT TRAINING EA 15 MIN 3/3/2021 Jeanette Chery, ANSHUL GP 1          PT G-Codes  Outcome Measure Options: AM-PAC 6 Clicks Daily Activity (OT)  AM-PAC 6 Clicks Score (PT): 19  AM-PAC 6 Clicks Score (OT): 19    Jeanette Chery PTA  3/3/2021

## 2021-03-03 NOTE — PROGRESS NOTES
Continued Stay Note  [facility]     Patient Name: Hernan Su  MRN: [id number]  Today's Date: 3/3/2021    Admit Date: 2/24/2021    Discharge Plan     Row Name 03/03/21 0811       Plan    Plan  [facility] (awaiting precert)    Patient/Family in Agreement with Plan  yes    Plan Comments  SW spoke to admissions at [facility] who is still awaiting precert.  SW will continue to follow.        Discharge Codes    No documentation.             COLT Lazar

## 2021-03-03 NOTE — THERAPY TREATMENT NOTE
Acute Care - Occupational Therapy Treatment Note  [facility]     Patient Name: Hernan Su  : 1940  MRN: [id number]  Today's Date: 3/3/2021             Admit Date: 2021       ICD-10-CM ICD-9-CM   1. Acute on chronic congestive heart failure, unspecified heart failure type (CMS/Formerly [facility])  I50.9 428.0   2. Pleural effusion on right  J90 511.9   3. Atrial fibrillation with rapid ventricular response (CMS/Formerly [facility])  I48.91 427.31   4. Hypoxia  R09.02 799.02   5. Decreased activities of daily living (ADL)  Z78.9 V49.89   6. Impaired mobility  Z74.09 799.89     Patient Active Problem List   Diagnosis   • Essential hypertension   • Tobacco abuse   • Anxiety   • Gout   • Hypokalemia   • Alcohol abuse   • ST elevation myocardial infarction (STEMI) (CMS/[facility]ly [facility])   • Coronary artery disease of native artery of native heart with stable angina pectoris (CMS/[facility]ly [facility])   • Mixed hyperlipidemia   • Closed fracture of left hip (CMS/[facility]ly [facility])   • Cellulitis   • Atrial fibrillation with rapid ventricular response (CMS/[facility])   • Bilateral lower extremity venous stasis ulcers (CMS/[facility])   • Hyponatremia suspect secondary to alcohol use and hydrochlorothiazide   • Acute on chronic congestive heart failure (CMS/[facility])     Past Medical History:   Diagnosis Date   • Abnormal EKG    • Acute on chronic congestive heart failure (CMS/[facility]ly [facility]) 2021   • Alcohol abuse    • Arthritis    • Atrial fibrillation with rapid ventricular response (CMS/[facility]) 2021   • B12 deficiency    • Cellulitis    • Coronary artery disease    • Depression    • Gout    • Gout    • HTN (hypertension)    • Hyperlipidemia    • Hypokalemia    • Hyponatremia    • Mixed hyperlipidemia    • Myocardial infarction (CMS/[facility])    • Neuropathy    • On home oxygen therapy    • RLS (restless legs syndrome)    • RLS (restless legs syndrome)      Past Surgical History:   Procedure Laterality Date   • ARM TENDON REPAIR     • CARDIAC CATHETERIZATION N/A 10/19/2018    Procedure: Left Heart Cath;   Surgeon: Antonino Joshua MD;  Location:  [facility] CATH INVASIVE LOCATION;  Service: Cardiovascular   • CORONARY STENT PLACEMENT     • EYE SURGERY Left    • HIP HEMIARTHROPLASTY Left 7/28/2020    Procedure: HIP HEMIARTHROPLASTY;  Surgeon: Adi Figueredo MD;  Location:  [facility] OR;  Service: Orthopedics;  Laterality: Left;            OT ASSESSMENT FLOWSHEET (last 12 hours)      OT Evaluation and Treatment     Row Name 03/03/21 0752                   OT Time and Intention    Subjective Information  complains of;weakness;fatigue  -        Document Type  therapy note (daily note)  -        Mode of Treatment  occupational therapy  -        Patient Effort  good  -           General Information    Existing Precautions/Restrictions  fall;oxygen therapy device and L/min  -           Pain Assessment    Additional Documentation  Pain Scale: Word Pre/Post-Treatment (Group)  -           Pain Scale: Numbers Pre/Post-Treatment    Pretreatment Pain Rating  0/10 - no pain  -        Posttreatment Pain Rating  0/10 - no pain  -        Pain Intervention(s)  Rest  -           Functional Mobility    Functional Mobility- Comment  sitting in chair!  -           Motor Skills    Motor Skills  therapeutic exercise  -        Therapeutic Exercise  shoulder;elbow/forearm  -           Shoulder (Therapeutic Exercise)    Shoulder (Therapeutic Exercise)  AROM (active range of motion);strengthening exercise  -        Shoulder AROM (Therapeutic Exercise)  bilateral;flexion;extension;sitting;10 repetitions  -        Shoulder Strengthening (Therapeutic Exercise)  bilateral;flexion;extension;sitting;2 lb free weight;3 lb free weight  -           Elbow/Forearm (Therapeutic Exercise)    Elbow/Forearm (Therapeutic Exercise)  AROM (active range of motion);strengthening exercise  -        Elbow/Forearm AROM (Therapeutic Exercise)  bilateral;flexion;extension;sitting;10 repetitions;2 sets  -        Elbow/Forearm Strengthening  (Therapeutic Exercise)  bilateral;flexion;extension;sitting;2 lb free weight;3 lb free weight  -CJ           Wound 01/25/21 2215 Right lateral knee    Wound - Properties Group Placement Date: 01/25/21 -BJ Placement Time: 2215 -BJ Present on Hospital Admission: Y  -BJ Side: Right  -BJ Orientation: lateral  -BJ, left lateral  Location: knee  -BJ    Retired Wound - Properties Group Date first assessed: 01/25/21 -BJ Time first assessed: 2215 -BJ Present on Hospital Admission: Y  -BJ Side: Right  -BJ Location: knee  -BJ       Wound 01/25/21 2221 Right distal leg    Wound - Properties Group Placement Date: 01/25/21  -BJ Placement Time: 2221 -BJ Present on Hospital Admission: Y  -BJ Side: Right  -BJ Orientation: distal  -BJ Location: leg  -BJ    Retired Wound - Properties Group Date first assessed: 01/25/21 -BJ Time first assessed: 2221 -BJ Present on Hospital Admission: Y  -BJ Side: Right  -BJ Location: leg  -BJ       Wound 01/25/21 2222 Left ankle    Wound - Properties Group Placement Date: 01/25/21  -BJ Placement Time: 2222 -BJ Present on Hospital Admission: Y  -BJ Side: Left  -BJ Location: ankle  -BJ    Retired Wound - Properties Group Date first assessed: 01/25/21 -BJ Time first assessed: 2222 -BJ Present on Hospital Admission: Y  -BJ Side: Left  -BJ Location: ankle  -BJ       Wound 01/25/21 2222 Left lateral leg    Wound - Properties Group Placement Date: 01/25/21  -BJ Placement Time: 2222 -BJ Present on Hospital Admission: Y  -BJ Side: Left  -BJ Orientation: lateral  -BJ Location: leg  -BJ    Retired Wound - Properties Group Date first assessed: 01/25/21 -BJ Time first assessed: 2222 -BJ Present on Hospital Admission: Y  -BJ Side: Left  -BJ Location: leg  -BJ       Wound 02/25/21 0340 Left heel    Wound - Properties Group Placement Date: 02/25/21  -BF Placement Time: 0340  -BF Present on Hospital Admission: Y  -BF Side: Left  -BF Location: heel  -BF Stage, Pressure Injury : Stage 1  -BF    Retired Wound  - Properties Group Date first assessed: 02/25/21  -BF Time first assessed: 0340  -BF Present on Hospital Admission: Y  -BF Side: Left  -BF Location: heel  -BF       Positioning and Restraints    Pre-Treatment Position  sitting in chair/recliner  -CJ        Post Treatment Position  chair  -CJ        In Chair  sitting;call light within reach;encouraged to call for assist  -CJ           Progress Summary (OT)    Progress Toward Functional Goals (OT)  progress toward functional goals is good  -CJ        Barriers to Overall Progress (OT)  Fall/o2!  -CJ        Impairments Still Limiting Function (OT)  continue with ot poc!  -CJ          User Key  (r) = Recorded By, (t) = Taken By, (c) = Cosigned By    Initials Name Effective Dates    CJ Henrry Ferrara COTA/JEEVAN 08/02/16 -     BF Janna Kendrick RN 04/02/18 -     BJ Janna Cardona RN 09/02/20 -          Occupational Therapy Education                 Title: PT OT SLP Therapies (In Progress)     Topic: Occupational Therapy (Done)     Point: ADL training (Done)     Description:   Instruct learner(s) on proper safety adaptation and remediation techniques during self care or transfers.   Instruct in proper use of assistive devices.              Learning Progress Summary           Patient Acceptance, E, VU by AMISH at 3/1/2021 1143                   Point: Home exercise program (Done)     Description:   Instruct learner(s) on appropriate technique for monitoring, assisting and/or progressing therapeutic exercises/activities.              Learning Progress Summary           Patient Acceptance, E,TB, VU,DU,NR by  at 3/3/2021 0752    Comment: Pt. sitting in chair, performs ue exs to increase his act. nabeel!    Acceptance, E,TB, VU,DU,NR by  at 3/2/2021 0826    Comment: Pt. performed ue exs while seated in chair!                   Point: Precautions (Done)     Description:   Instruct learner(s) on prescribed precautions during self-care and functional transfers.               Learning Progress Summary           Patient Acceptance, E,TB, VU,DU,NR by  at 3/3/2021 0752    Comment: Pt. sitting in chair, performs ue exs to increase his act. nabeel!    Acceptance, E,TB, VU,DU,NR by  at 3/2/2021 0826    Comment: Pt. performed ue exs while seated in chair!    Acceptance, E, VU by  at 3/1/2021 1143                   Point: Body mechanics (Done)     Description:   Instruct learner(s) on proper positioning and spine alignment during self-care, functional mobility activities and/or exercises.              Learning Progress Summary           Patient Acceptance, E,TB, VU,DU,NR by  at 3/3/2021 0752    Comment: Pt. sitting in chair, performs ue exs to increase his act. nabeel!    Acceptance, E,TB, VU,DU,NR by  at 3/2/2021 0826    Comment: Pt. performed ue exs while seated in chair!    Acceptance, E, VU by  at 3/1/2021 1143                               User Key     Initials Effective Dates Name Provider Type Discipline     08/02/16 -  Henrry Ferrara COTA/L Occupational Therapy Assistant OT     12/04/20 -  Stacy Giraldo OTR/L Occupational Therapist OT                  OT Recommendation and Plan     Progress Toward Functional Goals (OT): progress toward functional goals is good  Plan of Care Review  Plan of Care Reviewed With: patient  Progress: improving  Outcome Summary: Pt. sitting in chair, refuses adl shower, states that someone is going to come and help him from home! Ue exs performed to increase his act. nabeel!  Plan of Care Reviewed With: patient  Outcome Summary: Pt. sitting in chair, refuses adl shower, states that someone is going to come and help him from home! Ue exs performed to increase his act. nabeel!    Outcome Measures     Row Name 03/03/21 0752 03/02/21 0826          How much help from another is currently needed...    Putting on and taking off regular lower body clothing?  2  -CJ  2  -CJ     Bathing (including washing, rinsing, and drying)  2  -CJ  2  -CJ     Toileting  (which includes using toilet bed pan or urinal)  3  -CJ  3  -CJ     Putting on and taking off regular upper body clothing  4  -CJ  4  -CJ     Taking care of personal grooming (such as brushing teeth)  4  -CJ  4  -CJ     Eating meals  4  -CJ  4  -CJ     AM-PAC 6 Clicks Score (OT)  19 -CJ 19  -CJ        Functional Assessment    Outcome Measure Options  AM-PAC 6 Clicks Daily Activity (OT)  -  AM-PAC 6 Clicks Daily Activity (OT)  -       User Key  (r) = Recorded By, (t) = Taken By, (c) = Cosigned By    Initials Name Provider Type    Henrry Blakely COTA/L Occupational Therapy Assistant          Time Calculation:   Time Calculation- OT     Row Name 03/03/21 0931 03/03/21 0752          Time Calculation- OT    OT Start Time  --  0752  -     OT Stop Time  --  0820  -     OT Time Calculation (min)  --  28 min  -     Total Timed Code Minutes- OT  --  28 minute(s)  -     TCU Minutes- OT  --  28 min  -     OT Received On  --  03/03/21  -        Timed Charges    07963 - OT Therapeutic Exercise Minutes  --  28  -     70925 - Gait Training Minutes   15  -AE  --       User Key  (r) = Recorded By, (t) = Taken By, (c) = Cosigned By    Initials Name Provider Type    AE Tere Prieto, ANSHUL Physical Therapy Assistant     Henrry Ferrara COTA/L Occupational Therapy Assistant        Therapy Charges for Today     Code Description Service Date Service Provider Modifiers Qty    49574669878 HC OT THER PROC EA 15 MIN 3/2/2021 Henrry Ferrara COTA/L GO 2    01231022450 HC OT THER PROC EA 15 MIN 3/3/2021 Henrry Ferrara COTA/L GO 2               SONIA Albright  3/3/2021

## 2021-03-03 NOTE — PROGRESS NOTES
[facility] Medicine Services  INPATIENT PROGRESS NOTE    Patient Name: Hernan Su  Date of Admission: 2/24/2021  Today's Date: 03/03/21  Length of Stay: 7  Primary Care Physician: Nataly Faria APRN    Subjective   Chief Complaint: f/u   HPI   He is an 80-year-old man admitted for atrial fibrillation with rapid ventricular response.  He was also in acute decompensation of chronic combined heart failure and found with large right pleural effusion.  His echocardiogram during this admission showed EF of 35%, LV wall thickness consistent with mild concentric hypertrophy.  He also has estimated right ventricular systolic pressure from tricuspid regurgitation greater than 55 mm.  He was diuresed and appears to have improved.  Follow-up imaging on his chest x-ray described that the moderate sized right pleural effusion has improved compared to February 24.     He is no longer using any oxygen supplement.  He maintains his oxygen in the mid to upper 90s in room air  He remains hemodynamically stable.  He works with PT OT.  It appears that he is improving.  He has been ambulating with improvement in distance walked.  He has been doing also some bedside exercises with OT.  From my understanding he is a resident of [facility].  He we are awaiting for insurance approval to return to skilled nursing facility.     Nurse Brenda asked for stool softener as he has not had any bowel movement.  He had 1 bowel movement earlier.      Review of Systems     All pertinent negatives and positives are as above. All other systems have been reviewed and are negative unless otherwise stated.     Objective    Temp:  [97.6 °F (36.4 °C)-98.3 °F (36.8 °C)] 97.6 °F (36.4 °C)  Heart Rate:  [83-98] 98  Resp:  [18] 18  BP: (105-128)/(69-92) 121/92  Physical Exam  Seated comfortably on recliner with his feet propped up  No distress  Pleasantly confused  Normal respiratory effort  GEN: Awake, alert,  interactive, in NAD, appears frail and chronically ill  HEENT:  PERRLA, EOMI, Anicteric, Trachea midline  Lungs:  still diminished at R base but breath sounds improving overall, no wheezes  Heart: irreg/irreg, +S1/s2,  ABD: soft, nt/nd, +BS, no guarding/rebound  Extremities: Ankle edema, no erythema  Skin: b/l LE wounds and scabs, no drainage. This has dressing currently except for the more proximal on left leg.  It looks clean, shallow wound that is healing   Neuro: AAOx 1-2, no focal deficits  Psych: normal mood & affect      Results Review:  I have reviewed the labs, radiology results, and diagnostic studies.    Laboratory Data:   Results from last 7 days   Lab Units 02/25/21  0402   WBC 10*3/mm3 5.90   HEMOGLOBIN g/dL 13.3   HEMATOCRIT % 40.4   PLATELETS 10*3/mm3 235        Results from last 7 days   Lab Units 03/01/21  0416 02/28/21  0536 02/27/21  0307   SODIUM mmol/L 137 136 136   POTASSIUM mmol/L 3.7 3.7 3.2*   CHLORIDE mmol/L 98 96* 93*   CO2 mmol/L 32.0* 34.0* 37.0*   BUN mg/dL 18 17 14   CREATININE mg/dL 0.92 0.87 0.77   CALCIUM mg/dL 9.8 9.6 9.3   GLUCOSE mg/dL 93 91 103*       Culture Data:   No results found for: BLOODCX, URINECX, WOUNDCX, MRSACX, RESPCX, STOOLCX    Radiology Data:   Imaging Results (Last 24 Hours)     ** No results found for the last 24 hours. **          I have reviewed the patient's current medications.     Assessment/Plan     Active Hospital Problems    Diagnosis   • **Atrial fibrillation with rapid ventricular response (CMS/HCC)   • Acute on chronic congestive heart failure (CMS/HCC)   • Bilateral lower extremity venous stasis ulcers (CMS/HCC)   • Essential hypertension   • Gout       I added an ACE inhibitor given EF less than 40%.  Heart rate is otherwise controlled.  Will need to monitor  bp closely.  Still on Lovenox but will not continue any anticoagulation as previously mentioned in prior progress note.  Wound care per nursing      allopurinol, 300 mg, Oral,  Daily  amitriptyline, 25 mg, Oral, Nightly  aspirin, 81 mg, Oral, Daily  dilTIAZem CD, 360 mg, Oral, Q24H  docusate sodium, 100 mg, Oral, BID  enoxaparin, 1 mg/kg, Subcutaneous, Q12H  furosemide, 40 mg, Oral, Daily  gabapentin, 100 mg, Oral, Q8H  sodium chloride, 10 mL, Intravenous, Q12H           Discharge Planning: ?  Electronically signed by Hernan Barbosa MD, 03/03/21, 15:26 CST.

## 2021-03-04 PROBLEM — E44.0 MODERATE MALNUTRITION (HCC): Status: ACTIVE | Noted: 2021-01-01

## 2021-03-04 NOTE — PROGRESS NOTES
Continued Stay Note   [facility]     Patient Name: Hernan Su  MRN: [id number]  Today's Date: 3/4/2021    Admit Date: 2/24/2021    Discharge Plan     Row Name 03/04/21 1003       Plan    Plan Comments  SPOKE TO JIMMY AT [facility] AND SHE ADVISED THAT INS IS STILL IN REVIEW PROCESS;  WILL CONT. TO FOLLOW FOR APPROVAL.        Discharge Codes    No documentation.             ORI Maloney

## 2021-03-04 NOTE — PLAN OF CARE
Problem: Adult Inpatient Plan of Care  Goal: Plan of Care Review  Outcome: Ongoing, Progressing  Flowsheets (Taken 3/4/2021 1697)  Progress: improving  Plan of Care Reviewed With: patient  Outcome Summary: Pt has had no complaints of pain throughout the night, rested between care. CHRIS, Afib 78-94 on tele. Waiting for precert to [facility], will update MD as needed

## 2021-03-04 NOTE — PROGRESS NOTES
Case Management Discharge Note      Final Note: REC'D CALL FROM JIMMY AT [facility] ADVISING THAT PT'S INS HAS APPROVED FOR HIS ADMIT TO NH TODAY.  PT. WILL BE ADMITTED TO THE SKILLED LEVEL OF CARE.  [facility] HAS ACCESS TO D/C SUMMARY SO NO NEED TO FAX, HOWEVER IF PT. HAS ANY NARCOTIC RX THEY WILL NEED TO BE FAXED -[phone].  NURSE, PLEASE CALL REPORT -[phone]. THANKS.         Selected Continued Care - Admitted Since 2/24/2021     Destination Coordination complete    Service Provider Selected Services Address Phone Fax Patient Preferred    [facility] [place]  Skilled Nursing [address] , [facility] [id number] [phone] --          Durable Medical Equipment    No services have been selected for the patient.              Dialysis/Infusion    No services have been selected for the patient.              Home Medical Care    No services have been selected for the patient.              Therapy    No services have been selected for the patient.              Community Resources    No services have been selected for the patient.                Selected Continued Care - Prior Encounters Includes selections from prior encounters from 11/26/2020 to 3/4/2021    Discharged on 1/29/2021 Admission date: 1/25/2021 - Discharge disposition: Skilled Nursing Facility (DC - External)    Destination     Service Provider Selected Services Address Phone Fax Patient Preferred    [facility]  Skilled Nursing [address] , [facility] [id number] [phone] --                         Final Discharge Disposition Code: 03 - skilled nursing facility (SNF)

## 2021-03-04 NOTE — PLAN OF CARE
Goal Outcome Evaluation:  Plan of Care Reviewed With: patient  Progress: improving  Outcome Summary: Patient alert and oriented x4 most of the day, but he is sometimes confused. He sits in the chair mostly today. PT/OT working with patient. VSS. Dressing changed done. Still awaiting on pre cert to [facility].

## 2021-03-04 NOTE — DISCHARGE SUMMARY
[facility] Medicine Services  DISCHARGE SUMMARY       Date of Admission: 2/24/2021  Date of Discharge:  3/4/2021  Primary Care Physician: Nataly Faria APRN    Discharge Diagnoses:  Active Hospital Problems    Diagnosis   • **Atrial fibrillation with rapid ventricular response (CMS/HCC)   • Moderate malnutrition (CMS/HCC)   • Acute on chronic congestive heart failure (CMS/[facility])   • Bilateral lower extremity venous stasis ulcers (CMS/HCC)   • Essential hypertension   • Gout         Presenting Problem/History of Present Illness:  Acute on chronic congestive heart failure, unspecified heart failure type (CMS/[facility]) [I50.9]         Hospital Course  He is an 80-year-old man admitted for atrial fibrillation with rapid ventricular response.  He was also in acute decompensation of chronic combined heart failure and found with large right pleural effusion.  His echocardiogram during this admission showed EF of 35%, LV wall thickness consistent with mild concentric hypertrophy.  He also has estimated right ventricular systolic pressure from tricuspid regurgitation greater than 55 mm.  He was diuresed and appears to have improved.  Follow-up imaging on his chest x-ray described that the moderate sized right pleural effusion has improved compared to February 24.     He is no longer using any oxygen supplement.  He maintains his oxygen in the mid to upper 90s in room air.      He works with PT OT.  It appears that he is improving.  He has been ambulating with improvement in distance walked.  He has been doing also some bedside exercises with OT.  He has been medically stable for discharge at least since Monday.  Insurance approval came through today.  Patient will be transferred to [facility] skilled nursing facility as per discussion with social service.      I added low-dose lisinopril yesterday in his regimen.  In ideal setting, beta-blocker would have been added as well although will  defer to primary care provider so it can be monitored as most recent blood pressure was 101/77.  Note that patient was not on any beta-blocker or ACE inhibitor prior to arrival date despite known history.  There has been thoughts that changing Cardizem to Toprol-XL but on progress note by Dr. Medina on February 27, will defer to outpatient cardiologist in follow-up.    Also, patient reportedly has not been on blood thinner due to history of fall and his risk for fall.  Dr. Medina felt the patient still not a good candidate for blood thinners.  He discussed issue with daughter who agreed.    Patient is medically stable and appropriate for discharge.      Procedures Performed: None    Consults:   Wound care nurse/Wendy King    Pertinent Test Results:   Lab Results (last 7 days)     Procedure Component Value Units Date/Time    Blood Culture - Blood, Hand, Right [[id number]] Collected: 02/24/21 1117    Specimen: Blood from Hand, Right Updated: 03/01/21 1200     Blood Culture No growth at 5 days    Blood Culture - Blood, Arm, Left [[id number]] Collected: 02/24/21 1110    Specimen: Blood from Arm, Left Updated: 03/01/21 1130     Blood Culture No growth at 5 days    Extra Tubes [[id number]] Collected: 03/01/21 0500    Specimen: Blood, Venous Line Updated: 03/01/21 0645    Narrative:      The following orders were created for panel order Extra Tubes.  Procedure                               Abnormality         Status                     ---------                               -----------         ------                     Lavender Top[[id number]]                                     Final result                 Please view results for these tests on the individual orders.    Lavender Top [[id number]] Collected: 03/01/21 0500    Specimen: Blood Updated: 03/01/21 0645     Extra Tube hold for add-on     Comment: Auto resulted       Basic Metabolic Panel [[id number]]  (Abnormal) Collected: 03/01/21 0416    Specimen: Blood Updated:  03/01/21 0512     Glucose 93 mg/dL      BUN 18 mg/dL      Creatinine 0.92 mg/dL      Sodium 137 mmol/L      Potassium 3.7 mmol/L      Chloride 98 mmol/L      CO2 32.0 mmol/L      Calcium 9.8 mg/dL      eGFR Non African Amer 79 mL/min/1.73      BUN/Creatinine Ratio 19.6     Anion Gap 7.0 mmol/L     Narrative:      GFR Normal >60  Chronic Kidney Disease <60  Kidney Failure <15      Basic Metabolic Panel [[id number]]  (Abnormal) Collected: 02/28/21 0536    Specimen: Blood Updated: 02/28/21 0621     Glucose 91 mg/dL      BUN 17 mg/dL      Creatinine 0.87 mg/dL      Sodium 136 mmol/L      Potassium 3.7 mmol/L      Chloride 96 mmol/L      CO2 34.0 mmol/L      Calcium 9.6 mg/dL      eGFR Non African Amer 84 mL/min/1.73      BUN/Creatinine Ratio 19.5     Anion Gap 6.0 mmol/L     Narrative:      GFR Normal >60  Chronic Kidney Disease <60  Kidney Failure <15      Magnesium [[id number]]  (Normal) Collected: 02/28/21 0536    Specimen: Blood Updated: 02/28/21 0621     Magnesium 1.9 mg/dL     Basic Metabolic Panel [[id number]]  (Abnormal) Collected: 02/27/21 0307    Specimen: Blood Updated: 02/27/21 0415     Glucose 103 mg/dL      BUN 14 mg/dL      Creatinine 0.77 mg/dL      Sodium 136 mmol/L      Potassium 3.2 mmol/L      Chloride 93 mmol/L      CO2 37.0 mmol/L      Calcium 9.3 mg/dL      eGFR Non African Amer 97 mL/min/1.73      BUN/Creatinine Ratio 18.2     Anion Gap 6.0 mmol/L     Narrative:      GFR Normal >60  Chronic Kidney Disease <60  Kidney Failure <15      Magnesium [[id number]]  (Normal) Collected: 02/27/21 0307    Specimen: Blood Updated: 02/27/21 0414     Magnesium 1.8 mg/dL     Basic Metabolic Panel [[id number]]  (Abnormal) Collected: 02/26/21 0516    Specimen: Blood Updated: 02/26/21 0618     Glucose 95 mg/dL      BUN 14 mg/dL      Creatinine 0.74 mg/dL      Sodium 139 mmol/L      Potassium 3.9 mmol/L      Chloride 94 mmol/L      CO2 40.0 mmol/L      Calcium 9.4 mg/dL      eGFR Non African Amer 102 mL/min/1.73       "BUN/Creatinine Ratio 18.9     Anion Gap 5.0 mmol/L     Narrative:      GFR Normal >60  Chronic Kidney Disease <60  Kidney Failure <15      Magnesium [[id number]]  (Abnormal) Collected: 02/26/21 0516    Specimen: Blood Updated: 02/26/21 0610     Magnesium 1.5 mg/dL         Imaging Results (Last 7 Days)     Procedure Component Value Units Date/Time    XR Chest PA & Lateral [[id number]] Collected: 02/27/21 0934     Updated: 02/27/21 0938    Narrative:      HISTORY: Pleural effusion, hypoxia     CXR: 2 views the chest are obtained.     COMPARISON: 2/24/2021     FINDINGS: There is a decreasing size moderate left pleural effusion.  There are bilateral pulmonary opacities favoring edema. Cardiomegaly.  Questionable trace left pleural effusion. No pneumothorax. Thoracic  aortic calcification. Old healed left clavicle fracture.       Impression:      1. Decreasing size moderate right pleural effusion when compared to  2/24/2021. Cardiomegaly with mixed interstitial alveolar pulmonary  edema-CHF favored.  This report was finalized on 02/27/2021 09:35 by Dr. Stacy Ronquillo MD.          Condition on Discharge: Stable  Physical Exam on Discharge:  /77 (BP Location: Left arm, Patient Position: Sitting)   Pulse 92   Temp 97.6 °F (36.4 °C) (Oral)   Resp 16   Ht 177.8 cm (70\")   Wt 62 kg (136 lb 9.6 oz)   SpO2 97%   BMI 19.60 kg/m²   Physical Exam  No distress  Pleasantly confused  Normal respiratory effort  GEN: Awake, alert, interactive, in NAD  HEENT:  PERRLA, EOMI, Anicteric, Trachea midline  Lungs:  still diminished at R base but breath sounds improving overall, no wheezes  Heart: irreg/irreg, +S1/s2, telemetry showed A. fib with rate of 91 beats  ABD: soft, nt/nd, +BS, no guarding/rebound  Extremities: Ankle edema, no erythema  Skin: b/l LE wounds and scabs, no drainage. This has dressing currently except for the more proximal on left leg.  It looks clean, shallow wound that is healing   Neuro: AAOx 1-2, no focal " deficits  Psych: normal mood & affect     The interested reader is referred to the photographs of wounds for details.    Discharge Disposition:  Skilled Nursing Facility (DC - External)    Discharge Medications:     Discharge Medications      New Medications      Instructions Start Date   docusate sodium 100 MG capsule   100 mg, Oral, 2 Times Daily      lisinopril 2.5 MG tablet  Commonly known as: PRINIVIL,ZESTRIL   2.5 mg, Oral, Every 24 Hours Scheduled   Start Date: March 5, 2021        Changes to Medications      Instructions Start Date   furosemide 40 MG tablet  Commonly known as: LASIX  What changed:   · medication strength  · how much to take   40 mg, Oral, Daily   Start Date: March 5, 2021        Continue These Medications      Instructions Start Date   allopurinol 300 MG tablet  Commonly known as: ZYLOPRIM   300 mg, Oral, Daily      amitriptyline 25 MG tablet  Commonly known as: ELAVIL   25 mg, Oral, Nightly      aspirin 81 MG EC tablet   81 mg, Oral, Daily      dilTIAZem  MG 24 hr capsule  Commonly known as: CARDIZEM CD   360 mg, Oral, Daily      gabapentin 100 MG capsule  Commonly known as: NEURONTIN   100 mg, Oral, 3 Times Daily      nitroglycerin 0.4 MG SL tablet  Commonly known as: NITROSTAT   0.4 mg, Sublingual, Every 5 Minutes PRN, Take no more than 3 doses in 15 minutes.      vitamin D 1.25 MG (34538 UT) capsule capsule  Commonly known as: ERGOCALCIFEROL   50,000 Units, Oral, Weekly, Wednesday         Stop These Medications    ALPRAZolam 0.5 MG tablet  Commonly known as: XANAX     HYDROcodone-acetaminophen 5-325 MG per tablet  Commonly known as: NORCO            Discharge Diet:   Diet Instructions     Heart healthy               Discharge Care Plan / Instructions: Wound care per nursing  Activity at Discharge:   Activity Instructions     Gradually resume your usual activities                 Follow-up Appointments:    Skilled nursing facility provider within 24 to 48 hours  Will need follow-up  with cardiology.  He had an appointment on October 26 however had not shown himself.  We will reset an appointment with cardiology (Dr. Joshua)  Test Results Pending at Discharge:      Electronically signed by Hernan Barbosa MD, 3/4/2021, 13:22 CST.    Time: 25 minutes      Part of this note may be an electronic transcription/translation of spoken language to printed text using the Dragon Dictation System.

## 2021-03-05 NOTE — THERAPY DISCHARGE NOTE
Acute Care - Physical Therapy Discharge Summary  [facility]       Patient Name: Hernan Su  : 1940  MRN: [id number]    Today's Date: 3/5/2021                 Admit Date: 2021      PT Recommendation and Plan    Visit Dx:    ICD-10-CM ICD-9-CM   1. Acute on chronic congestive heart failure, unspecified heart failure type (CMS/[facility])  I50.9 428.0   2. Pleural effusion on right  J90 511.9   3. Atrial fibrillation with rapid ventricular response (CMS/[facility])  I48.91 427.31   4. Hypoxia  R09.02 799.02   5. Decreased activities of daily living (ADL)  Z78.9 V49.89   6. Impaired mobility  Z74.09 799.89   7. Venous stasis ulcer of calf limited to breakdown of skin with varicose veins, unspecified laterality (CMS/[facility])  I83.002 454.0    L97.201        Outcome Measures     Row Name 21 0752 21 0826          How much help from another is currently needed...    Putting on and taking off regular lower body clothing?  2  -CJ  2  -CJ     Bathing (including washing, rinsing, and drying)  2  -CJ  2  -CJ     Toileting (which includes using toilet bed pan or urinal)  3  -CJ  3  -CJ     Putting on and taking off regular upper body clothing  4  -CJ  4  -CJ     Taking care of personal grooming (such as brushing teeth)  4  -CJ  4  -CJ     Eating meals  4  -CJ  4  -CJ     AM-PAC 6 Clicks Score (OT)  19  -CJ  19  -CJ        Functional Assessment    Outcome Measure Options  AM-PAC 6 Clicks Daily Activity (OT)  -CJ  AM-PAC 6 Clicks Daily Activity (OT)  -       User Key  (r) = Recorded By, (t) = Taken By, (c) = Cosigned By    Initials Name Provider Type    Henrry Blakely COTA/L Occupational Therapy Assistant              Rehab Goal Summary     Row Name 21 0802             Gait Training Goal 1 (PT)    Activity/Assistive Device (Gait Training Goal 1, PT)  gait (walking locomotion);assistive device use;decrease fall risk;diminish gait deviation;improve balance and speed;increase endurance/gait distance  -       [place] Level (Gait Training Goal 1, PT)  independent  -MF      Distance (Gait Training Goal 1, PT)  200'  -MF      Time Frame (Gait Training Goal 1, PT)  long term goal (LTG);10 days  -MF      Progress/Outcome (Gait Training Goal 1, PT)  goal not met  -MF         ROM Goal 1 (PT)    ROM Goal 1 (PT)  Patient will demonstrate full LE AROM with decreased trunk sway and LOB while sitting EOB to indicate improved strength in LEs and trunk  -MF      Time Frame (ROM Goal 1, PT)  long-term goal (LTG);1 week  -MF      Progress/Outcome (ROM Goal 1, PT)  goal not met  -MF        User Key  (r) = Recorded By, (t) = Taken By, (c) = Cosigned By    Initials Name Provider Type Discipline    Stephanie Barragan, PTA Physical Therapy Assistant PT              PT Discharge Summary  Anticipated Discharge Disposition (PT): skilled nursing facility  Reason for Discharge: Discharge from facility  Outcomes Achieved: Unable to make functional progress toward goals at this time  Discharge Destination: SNF      Stephanie Hanna PTA   3/5/2021

## 2021-03-05 NOTE — PAYOR COMM NOTE
"REF: [id number]    [facility]  JIMMY  [phone]  OR  FAX  [phone]       SumanKrystinJanine pennnn (80 y.o. Male)     Date of Birth Social Security Number Address Home Phone MRN    1940  [address] [phone] [id number]    [employer] Marital Status          Mormonism        Admission Date Admission Type Admitting Provider Attending Provider Department, Room/Bed    2/24/21 Emergency Jm Barbosa MD  [facility] 4B, 402/1    Discharge Date Discharge Disposition Discharge Destination        3/4/2021 Skilled Nursing Facility (DC - External)              Attending Provider: (none)   Allergies: Shellfish-derived Products    Isolation: None   Infection: None   Code Status: Prior    Ht: 177.8 cm (70\")   Wt: 62 kg (136 lb 9.6 oz)    Admission Cmt: None   Principal Problem: Atrial fibrillation with rapid ventricular response (CMS/HCC) [I48.91]                 Active Insurance as of 2/24/2021     Primary Coverage     Payor Plan Insurance Group Employer/Plan Group    [facility] MEDICARE REPLACEMENT [employer] MEDICARE REPLACEMENT      Payor Plan Address Payor Plan Phone Number Payor Plan Fax Number Effective Dates    [address] [address] [phone]  7/28/2020 - None Entered    [facility] [id number]       Subscriber Name Subscriber Birth Date Member ID       JM MCNEIL 1940 [id number]                 Emergency Contacts      (Rel.) Home Phone Work Phone Mobile Phone    Bella Vieyra (Significant Other) [phone] -- --    VernaSumanth penn (Son) [phone] -- --               Discharge Summary      Jm Barbosa MD at 03/04/21 1216              [facility] Medicine Services  DISCHARGE SUMMARY       Date of Admission: 2/24/2021  Date of Discharge:  3/4/2021  Primary Care Physician: Nataly Faria APRN    Discharge Diagnoses:  Active Hospital Problems    Diagnosis   • " **Atrial fibrillation with rapid ventricular response (CMS/HCC)   • Moderate malnutrition (CMS/HCC)   • Acute on chronic congestive heart failure (CMS/[facility])   • Bilateral lower extremity venous stasis ulcers (CMS/[facility])   • Essential hypertension   • Gout         Presenting Problem/History of Present Illness:  Acute on chronic congestive heart failure, unspecified heart failure type (CMS/[facility]) [I50.9]         Hospital Course  He is an 80-year-old man admitted for atrial fibrillation with rapid ventricular response.  He was also in acute decompensation of chronic combined heart failure and found with large right pleural effusion.  His echocardiogram during this admission showed EF of 35%, LV wall thickness consistent with mild concentric hypertrophy.  He also has estimated right ventricular systolic pressure from tricuspid regurgitation greater than 55 mm.  He was diuresed and appears to have improved.  Follow-up imaging on his chest x-ray described that the moderate sized right pleural effusion has improved compared to February 24.     He is no longer using any oxygen supplement.  He maintains his oxygen in the mid to upper 90s in room air.      He works with PT OT.  It appears that he is improving.  He has been ambulating with improvement in distance walked.  He has been doing also some bedside exercises with OT.  He has been medically stable for discharge at least since Monday.  Insurance approval came through today.  Patient will be transferred to [facility] skilled nursing facility as per discussion with social service.      I added low-dose lisinopril yesterday in his regimen.  In ideal setting, beta-blocker would have been added as well although will defer to primary care provider so it can be monitored as most recent blood pressure was 101/77.  Note that patient was not on any beta-blocker or ACE inhibitor prior to arrival date despite known history.  There has been thoughts that changing Cardizem to Toprol-XL but  on progress note by Dr. Medina on February 27, will defer to outpatient cardiologist in follow-up.    Also, patient reportedly has not been on blood thinner due to history of fall and his risk for fall.  Dr. Medina felt the patient still not a good candidate for blood thinners.  He discussed issue with daughter who agreed.    Patient is medically stable and appropriate for discharge.      Procedures Performed: None    Consults:   Wound care nurse/Wendy King    Pertinent Test Results:   Lab Results (last 7 days)     Procedure Component Value Units Date/Time    Blood Culture - Blood, Hand, Right [[id number]] Collected: 02/24/21 1117    Specimen: Blood from Hand, Right Updated: 03/01/21 1200     Blood Culture No growth at 5 days    Blood Culture - Blood, Arm, Left [[id number]] Collected: 02/24/21 1110    Specimen: Blood from Arm, Left Updated: 03/01/21 1130     Blood Culture No growth at 5 days    Extra Tubes [[id number]] Collected: 03/01/21 0500    Specimen: Blood, Venous Line Updated: 03/01/21 0645    Narrative:      The following orders were created for panel order Extra Tubes.  Procedure                               Abnormality         Status                     ---------                               -----------         ------                     Lavender Top[[id number]]                                     Final result                 Please view results for these tests on the individual orders.    Lavender Top [[id number]] Collected: 03/01/21 0500    Specimen: Blood Updated: 03/01/21 0645     Extra Tube hold for add-on     Comment: Auto resulted       Basic Metabolic Panel [[id number]]  (Abnormal) Collected: 03/01/21 0416    Specimen: Blood Updated: 03/01/21 0512     Glucose 93 mg/dL      BUN 18 mg/dL      Creatinine 0.92 mg/dL      Sodium 137 mmol/L      Potassium 3.7 mmol/L      Chloride 98 mmol/L      CO2 32.0 mmol/L      Calcium 9.8 mg/dL      eGFR Non African Amer 79 mL/min/1.73      BUN/Creatinine Ratio 19.6      Anion Gap 7.0 mmol/L     Narrative:      GFR Normal >60  Chronic Kidney Disease <60  Kidney Failure <15      Basic Metabolic Panel [[id number]]  (Abnormal) Collected: 02/28/21 0536    Specimen: Blood Updated: 02/28/21 0621     Glucose 91 mg/dL      BUN 17 mg/dL      Creatinine 0.87 mg/dL      Sodium 136 mmol/L      Potassium 3.7 mmol/L      Chloride 96 mmol/L      CO2 34.0 mmol/L      Calcium 9.6 mg/dL      eGFR Non African Amer 84 mL/min/1.73      BUN/Creatinine Ratio 19.5     Anion Gap 6.0 mmol/L     Narrative:      GFR Normal >60  Chronic Kidney Disease <60  Kidney Failure <15      Magnesium [[id number]]  (Normal) Collected: 02/28/21 0536    Specimen: Blood Updated: 02/28/21 0621     Magnesium 1.9 mg/dL     Basic Metabolic Panel [[id number]]  (Abnormal) Collected: 02/27/21 0307    Specimen: Blood Updated: 02/27/21 0415     Glucose 103 mg/dL      BUN 14 mg/dL      Creatinine 0.77 mg/dL      Sodium 136 mmol/L      Potassium 3.2 mmol/L      Chloride 93 mmol/L      CO2 37.0 mmol/L      Calcium 9.3 mg/dL      eGFR Non African Amer 97 mL/min/1.73      BUN/Creatinine Ratio 18.2     Anion Gap 6.0 mmol/L     Narrative:      GFR Normal >60  Chronic Kidney Disease <60  Kidney Failure <15      Magnesium [[id number]]  (Normal) Collected: 02/27/21 0307    Specimen: Blood Updated: 02/27/21 0414     Magnesium 1.8 mg/dL     Basic Metabolic Panel [[id number]]  (Abnormal) Collected: 02/26/21 0516    Specimen: Blood Updated: 02/26/21 0618     Glucose 95 mg/dL      BUN 14 mg/dL      Creatinine 0.74 mg/dL      Sodium 139 mmol/L      Potassium 3.9 mmol/L      Chloride 94 mmol/L      CO2 40.0 mmol/L      Calcium 9.4 mg/dL      eGFR Non African Amer 102 mL/min/1.73      BUN/Creatinine Ratio 18.9     Anion Gap 5.0 mmol/L     Narrative:      GFR Normal >60  Chronic Kidney Disease <60  Kidney Failure <15      Magnesium [[id number]]  (Abnormal) Collected: 02/26/21 0516    Specimen: Blood Updated: 02/26/21 0610     Magnesium 1.5 mg/dL      "    Imaging Results (Last 7 Days)     Procedure Component Value Units Date/Time    XR Chest PA & Lateral [[id number]] Collected: 02/27/21 0934     Updated: 02/27/21 0938    Narrative:      HISTORY: Pleural effusion, hypoxia     CXR: 2 views the chest are obtained.     COMPARISON: 2/24/2021     FINDINGS: There is a decreasing size moderate left pleural effusion.  There are bilateral pulmonary opacities favoring edema. Cardiomegaly.  Questionable trace left pleural effusion. No pneumothorax. Thoracic  aortic calcification. Old healed left clavicle fracture.       Impression:      1. Decreasing size moderate right pleural effusion when compared to  2/24/2021. Cardiomegaly with mixed interstitial alveolar pulmonary  edema-CHF favored.  This report was finalized on 02/27/2021 09:35 by Dr. Stacy Ronquillo MD.          Condition on Discharge: Stable  Physical Exam on Discharge:  /77 (BP Location: Left arm, Patient Position: Sitting)   Pulse 92   Temp 97.6 °F (36.4 °C) (Oral)   Resp 16   Ht 177.8 cm (70\")   Wt 62 kg (136 lb 9.6 oz)   SpO2 97%   BMI 19.60 kg/m²   Physical Exam  No distress  Pleasantly confused  Normal respiratory effort  GEN: Awake, alert, interactive, in NAD  HEENT:  PERRLA, EOMI, Anicteric, Trachea midline  Lungs:  still diminished at R base but breath sounds improving overall, no wheezes  Heart: irreg/irreg, +S1/s2, telemetry showed A. fib with rate of 91 beats  ABD: soft, nt/nd, +BS, no guarding/rebound  Extremities: Ankle edema, no erythema  Skin: b/l LE wounds and scabs, no drainage. This has dressing currently except for the more proximal on left leg.  It looks clean, shallow wound that is healing   Neuro: AAOx 1-2, no focal deficits  Psych: normal mood & affect     The interested reader is referred to the photographs of wounds for details.    Discharge Disposition:  Skilled Nursing Facility (DC - External)    Discharge Medications:     Discharge Medications      New Medications      " Instructions Start Date   docusate sodium 100 MG capsule   100 mg, Oral, 2 Times Daily      lisinopril 2.5 MG tablet  Commonly known as: PRINIVIL,ZESTRIL   2.5 mg, Oral, Every 24 Hours Scheduled   Start Date: March 5, 2021        Changes to Medications      Instructions Start Date   furosemide 40 MG tablet  Commonly known as: LASIX  What changed:   · medication strength  · how much to take   40 mg, Oral, Daily   Start Date: March 5, 2021        Continue These Medications      Instructions Start Date   allopurinol 300 MG tablet  Commonly known as: ZYLOPRIM   300 mg, Oral, Daily      amitriptyline 25 MG tablet  Commonly known as: ELAVIL   25 mg, Oral, Nightly      aspirin 81 MG EC tablet   81 mg, Oral, Daily      dilTIAZem  MG 24 hr capsule  Commonly known as: CARDIZEM CD   360 mg, Oral, Daily      gabapentin 100 MG capsule  Commonly known as: NEURONTIN   100 mg, Oral, 3 Times Daily      nitroglycerin 0.4 MG SL tablet  Commonly known as: NITROSTAT   0.4 mg, Sublingual, Every 5 Minutes PRN, Take no more than 3 doses in 15 minutes.      vitamin D 1.25 MG (70139 UT) capsule capsule  Commonly known as: ERGOCALCIFEROL   50,000 Units, Oral, Weekly, Wednesday         Stop These Medications    ALPRAZolam 0.5 MG tablet  Commonly known as: XANAX     HYDROcodone-acetaminophen 5-325 MG per tablet  Commonly known as: NORCO            Discharge Diet:   Diet Instructions     Heart healthy               Discharge Care Plan / Instructions: Wound care per nursing  Activity at Discharge:   Activity Instructions     Gradually resume your usual activities                 Follow-up Appointments:    Skilled nursing facility provider within 24 to 48 hours  Will need follow-up with cardiology.  He had an appointment on October 26 however had not shown himself.  We will reset an appointment with cardiology (Dr. Joshua)  Test Results Pending at Discharge:      Electronically signed by Hernan Barbosa MD, 3/4/2021, 13:22  CST.    Time: 25 minutes      Part of this note may be an electronic transcription/translation of spoken language to printed text using the Dragon Dictation System.            Electronically signed by Hernan Barbosa MD at 03/04/21 3511

## 2021-03-05 NOTE — THERAPY DISCHARGE NOTE
Acute Care - Occupational Therapy Discharge Summary  [facility]     Patient Name: Hernan Su  : 1940  MRN: [id number]    Today's Date: 3/5/2021                 Admit Date: 2021        OT Recommendation and Plan    Visit Dx:    ICD-10-CM ICD-9-CM   1. Acute on chronic congestive heart failure, unspecified heart failure type (CMS/HCC)  I50.9 428.0   2. Pleural effusion on right  J90 511.9   3. Atrial fibrillation with rapid ventricular response (CMS/[facility])  I48.91 427.31   4. Hypoxia  R09.02 799.02   5. Decreased activities of daily living (ADL)  Z78.9 V49.89   6. Impaired mobility  Z74.09 799.89   7. Venous stasis ulcer of calf limited to breakdown of skin with varicose veins, unspecified laterality (CMS/[facility])  I83.002 454.0    L97.201                Rehab Goal Summary     Row Name 21 1400 21 0802          Gait Training Goal 1 (PT)    Activity/Assistive Device (Gait Training Goal 1, PT)  --  gait (walking locomotion);assistive device use;decrease fall risk;diminish gait deviation;improve balance and speed;increase endurance/gait distance  -MF     [place] Level (Gait Training Goal 1, PT)  --  independent  -MF     Distance (Gait Training Goal 1, PT)  --  200'  -MF     Time Frame (Gait Training Goal 1, PT)  --  long term goal (LTG);10 days  -MF     Progress/Outcome (Gait Training Goal 1, PT)  --  goal not met  -MF        ROM Goal 1 (PT)    ROM Goal 1 (PT)  --  Patient will demonstrate full LE AROM with decreased trunk sway and LOB while sitting EOB to indicate improved strength in LEs and trunk  -MF     Time Frame (ROM Goal 1, PT)  --  long-term goal (LTG);1 week  -MF     Progress/Outcome (ROM Goal 1, PT)  --  goal not met  -MF        Bathing Goal 1 (OT)    Activity/Device (Bathing Goal 1, OT)  bathing skills, all  -TS  --     [place] Level/Cues Needed (Bathing Goal 1, OT)  set-up required;standby assist  -TS  --     Time Frame (Bathing Goal 1, OT)  long term goal (LTG);by discharge   -TS  --     Progress/Outcomes (Bathing Goal 1, OT)  goal not met  -TS  --        Dressing Goal 1 (OT)    Activity/Device (Dressing Goal 1, OT)  lower body dressing  -TS  --     [place]/Cues Needed (Dressing Goal 1, OT)  contact guard assist  -TS  --     Time Frame (Dressing Goal 1, OT)  long term goal (LTG);by discharge  -TS  --     Progress/Outcome (Dressing Goal 1, OT)  goal not met  -TS  --        Toileting Goal 1 (OT)    Activity/Device (Toileting Goal 1, OT)  toileting skills, all  -TS  --     [place] Level/Cues Needed (Toileting Goal 1, OT)  independent  -TS  --     Time Frame (Toileting Goal 1, OT)  long term goal (LTG);by discharge  -TS  --     Progress/Outcome (Toileting Goal 1, OT)  goal not met  -TS  --       User Key  (r) = Recorded By, (t) = Taken By, (c) = Cosigned By    Initials Name Provider Type Discipline    TS Mirian Ross COTA/L Occupational Therapy Assistant OT    Stephanie Barragan, hospitals Physical Therapy Assistant PT          Outcome Measures     Row Name 03/03/21 0752             How much help from another is currently needed...    Putting on and taking off regular lower body clothing?  2  -CJ      Bathing (including washing, rinsing, and drying)  2  -CJ      Toileting (which includes using toilet bed pan or urinal)  3  -CJ      Putting on and taking off regular upper body clothing  4  -CJ      Taking care of personal grooming (such as brushing teeth)  4  -CJ      Eating meals  4  -CJ      AM-PAC 6 Clicks Score (OT)  19  -CJ         Functional Assessment    Outcome Measure Options  AM-PAC 6 Clicks Daily Activity (OT)  -CJ        User Key  (r) = Recorded By, (t) = Taken By, (c) = Cosigned By    Initials Name Provider Type    Henrry Blakely COTA/L Occupational Therapy Assistant          Timed Therapy Charges  Total Units: 2    Charges  Total Units: 2    Procedure Name Documented Minutes Units Code    HC OT THER PROC EA 15 MIN 28  2    25557 (CPT®)                Documented Minutes  Total Minutes: 28    Therapy Provided Minutes    53759 - OT Therapeutic Exercise Minutes 28                    OT Discharge Summary  Anticipated Discharge Disposition (OT): skilled nursing facility  Reason for Discharge: Discharge from facility  Outcomes Achieved: Refer to plan of care for updates on goals achieved  Discharge Destination: SNF      SONAI Barba  3/5/2021

## 2021-05-17 PROBLEM — G93.41 METABOLIC ENCEPHALOPATHY: Status: ACTIVE | Noted: 2021-01-01

## 2021-05-17 PROBLEM — K72.00 ACUTE LIVER FAILURE: Status: ACTIVE | Noted: 2021-01-01

## 2021-05-17 PROBLEM — E87.1 HYPONATREMIA: Status: ACTIVE | Noted: 2021-01-01

## 2021-05-17 PROBLEM — E87.5 HYPERKALEMIA: Status: ACTIVE | Noted: 2021-01-01

## 2021-05-17 PROBLEM — E83.39 HYPERPHOSPHATEMIA: Status: ACTIVE | Noted: 2021-01-01

## 2021-05-17 PROBLEM — E16.2 HYPOGLYCEMIA: Status: ACTIVE | Noted: 2021-01-01

## 2021-05-17 PROBLEM — K76.7 HEPATORENAL SYNDROME (HCC): Status: ACTIVE | Noted: 2021-01-01

## 2021-05-17 PROBLEM — N17.9 ACUTE KIDNEY INJURY (HCC): Status: ACTIVE | Noted: 2021-01-01

## 2021-05-22 LAB
BACTERIA SPEC AEROBE CULT: NORMAL

## (undated) DEVICE — SOLIDIFIER LIQUI LOC PLUS 2000CC

## (undated) DEVICE — CANN CO2/O2 NASL A/

## (undated) DEVICE — PERCLOSE PROGLIDE™ SUTURE-MEDIATED CLOSURE SYSTEM: Brand: PERCLOSE PROGLIDE™

## (undated) DEVICE — GLV SURG DERMASSURE GRN LF PF 8.0

## (undated) DEVICE — IMMOB KN 3PNL DLX CANVS 19IN BLU

## (undated) DEVICE — GW STARTER FXD CORE J .035 3X150CM 3MM

## (undated) DEVICE — CATH DIAG IMPULSE M/ PK 145 5FR

## (undated) DEVICE — ANTIBACTERIAL UNDYED BRAIDED (POLYGLACTIN 910), SYNTHETIC ABSORBABLE SUTURE: Brand: COATED VICRYL

## (undated) DEVICE — SPNG GZ STRL 2S 4X4 12PLY

## (undated) DEVICE — BNDG ELAS W/CLIP 6IN 10YD LF STRL

## (undated) DEVICE — PK CATH CARD 30

## (undated) DEVICE — A2000 MULTI-USE SYRINGE KIT, P/N 701277-003KIT CONTENTS: 100ML CONTRAST RESERVOIR AND TUBING WITH CONTRAST SPIKE AND CLAMP: Brand: A2000 MULTI-USE SYRINGE KIT

## (undated) DEVICE — CVR BRD ARM 13X30

## (undated) DEVICE — Device

## (undated) DEVICE — 3M™ STERI-DRAPE™ U-DRAPE 1015: Brand: STERI-DRAPE™

## (undated) DEVICE — VLV HEMO GUARDIAN INSRT/TOOL W TORQ DEV

## (undated) DEVICE — HANDPIECE SET WITH HIGH FLOW TIP AND SUCTION TUBE: Brand: INTERPULSE

## (undated) DEVICE — MODEL AT P65, P/N 701554-001KIT CONTENTS: HAND CONTROLLER, 3-WAY HIGH-PRESSURE STOPCOCK WITH ROTATING END AND PREMIUM HIGH-PRESSURE TUBING: Brand: ANGIOTOUCH® KIT

## (undated) DEVICE — GW GRAPHX .014 182CM

## (undated) DEVICE — GLV SURG BIOGEL LTX PF 8

## (undated) DEVICE — MODEL BT2000 P/N 700287-012KIT CONTENTS: MANIFOLD WITH SALINE AND CONTRAST PORTS, SALINE TUBING WITH SPIKE AND HAND SYRINGE, TRANSDUCER: Brand: BT2000 AUTOMATED MANIFOLD KIT

## (undated) DEVICE — 6F .070 JR 4 100CM: Brand: CORDIS

## (undated) DEVICE — SUT VIC 0 CT1 CR8 27IN UD VCPP41D

## (undated) DEVICE — DUAL CUT SAGITTAL BLADE

## (undated) DEVICE — TOWEL,OR,DSP,ST,BLUE,STD,4/PK,20PK/CS: Brand: MEDLINE

## (undated) DEVICE — PK HIP TOTL 30

## (undated) DEVICE — PTCA DILATATION CATHETER: Brand: EMERGE™

## (undated) DEVICE — OPTIFOAM GENTLE SA, POSTOP, 4X12: Brand: MEDLINE

## (undated) DEVICE — CATH DIAG IMPULSE FL5 5F 100CM

## (undated) DEVICE — SOL IRR NACL 0.9PCT BT 1000ML

## (undated) DEVICE — PINNACLE INTRODUCER SHEATH: Brand: PINNACLE

## (undated) DEVICE — 4-PORT MANIFOLD: Brand: NEPTUNE 2

## (undated) DEVICE — PK TURNOVER RM ADV

## (undated) DEVICE — SKIN AFFIX SURG ADHESIVE 72/CS 0.55ML: Brand: MEDLINE

## (undated) DEVICE — INFLATION DEVICE: Brand: ENCORE™ 26